# Patient Record
Sex: FEMALE | Race: WHITE | NOT HISPANIC OR LATINO | Employment: UNEMPLOYED | URBAN - METROPOLITAN AREA
[De-identification: names, ages, dates, MRNs, and addresses within clinical notes are randomized per-mention and may not be internally consistent; named-entity substitution may affect disease eponyms.]

---

## 2017-01-10 ENCOUNTER — PATIENT MESSAGE (OUTPATIENT)
Dept: INTERNAL MEDICINE | Facility: CLINIC | Age: 57
End: 2017-01-10

## 2017-01-11 ENCOUNTER — PATIENT MESSAGE (OUTPATIENT)
Dept: INTERNAL MEDICINE | Facility: CLINIC | Age: 57
End: 2017-01-11

## 2017-01-18 ENCOUNTER — PATIENT MESSAGE (OUTPATIENT)
Dept: INTERNAL MEDICINE | Facility: CLINIC | Age: 57
End: 2017-01-18

## 2017-01-19 ENCOUNTER — PATIENT MESSAGE (OUTPATIENT)
Dept: INTERNAL MEDICINE | Facility: CLINIC | Age: 57
End: 2017-01-19

## 2017-01-20 ENCOUNTER — PATIENT MESSAGE (OUTPATIENT)
Dept: INTERNAL MEDICINE | Facility: CLINIC | Age: 57
End: 2017-01-20

## 2017-01-31 ENCOUNTER — PATIENT MESSAGE (OUTPATIENT)
Dept: INTERNAL MEDICINE | Facility: CLINIC | Age: 57
End: 2017-01-31

## 2017-02-02 ENCOUNTER — PATIENT MESSAGE (OUTPATIENT)
Dept: INTERNAL MEDICINE | Facility: CLINIC | Age: 57
End: 2017-02-02

## 2017-02-07 ENCOUNTER — PATIENT MESSAGE (OUTPATIENT)
Dept: INTERNAL MEDICINE | Facility: CLINIC | Age: 57
End: 2017-02-07

## 2017-02-08 ENCOUNTER — PATIENT MESSAGE (OUTPATIENT)
Dept: INTERNAL MEDICINE | Facility: CLINIC | Age: 57
End: 2017-02-08

## 2017-02-15 ENCOUNTER — PATIENT MESSAGE (OUTPATIENT)
Dept: INTERNAL MEDICINE | Facility: CLINIC | Age: 57
End: 2017-02-15

## 2017-02-17 ENCOUNTER — PATIENT MESSAGE (OUTPATIENT)
Dept: INTERNAL MEDICINE | Facility: CLINIC | Age: 57
End: 2017-02-17

## 2017-03-01 ENCOUNTER — PATIENT MESSAGE (OUTPATIENT)
Dept: INTERNAL MEDICINE | Facility: CLINIC | Age: 57
End: 2017-03-01

## 2017-03-05 ENCOUNTER — PATIENT MESSAGE (OUTPATIENT)
Dept: INTERNAL MEDICINE | Facility: CLINIC | Age: 57
End: 2017-03-05

## 2017-03-06 ENCOUNTER — PATIENT MESSAGE (OUTPATIENT)
Dept: INTERNAL MEDICINE | Facility: CLINIC | Age: 57
End: 2017-03-06

## 2017-03-07 ENCOUNTER — TELEPHONE (OUTPATIENT)
Dept: INTERNAL MEDICINE | Facility: CLINIC | Age: 57
End: 2017-03-07

## 2017-03-07 ENCOUNTER — PATIENT MESSAGE (OUTPATIENT)
Dept: INTERNAL MEDICINE | Facility: CLINIC | Age: 57
End: 2017-03-07

## 2017-03-15 ENCOUNTER — PATIENT MESSAGE (OUTPATIENT)
Dept: INTERNAL MEDICINE | Facility: CLINIC | Age: 57
End: 2017-03-15

## 2017-03-16 ENCOUNTER — PATIENT MESSAGE (OUTPATIENT)
Dept: INTERNAL MEDICINE | Facility: CLINIC | Age: 57
End: 2017-03-16

## 2017-03-28 ENCOUNTER — PATIENT MESSAGE (OUTPATIENT)
Dept: INTERNAL MEDICINE | Facility: CLINIC | Age: 57
End: 2017-03-28

## 2017-03-29 ENCOUNTER — PATIENT MESSAGE (OUTPATIENT)
Dept: INTERNAL MEDICINE | Facility: CLINIC | Age: 57
End: 2017-03-29

## 2017-03-30 ENCOUNTER — PATIENT MESSAGE (OUTPATIENT)
Dept: INTERNAL MEDICINE | Facility: CLINIC | Age: 57
End: 2017-03-30

## 2017-04-03 ENCOUNTER — PATIENT MESSAGE (OUTPATIENT)
Dept: INTERNAL MEDICINE | Facility: CLINIC | Age: 57
End: 2017-04-03

## 2017-04-20 ENCOUNTER — PATIENT MESSAGE (OUTPATIENT)
Dept: INTERNAL MEDICINE | Facility: CLINIC | Age: 57
End: 2017-04-20

## 2017-05-09 ENCOUNTER — PATIENT MESSAGE (OUTPATIENT)
Dept: INTERNAL MEDICINE | Facility: CLINIC | Age: 57
End: 2017-05-09

## 2017-05-11 ENCOUNTER — PATIENT MESSAGE (OUTPATIENT)
Dept: INTERNAL MEDICINE | Facility: CLINIC | Age: 57
End: 2017-05-11

## 2017-05-16 ENCOUNTER — PATIENT MESSAGE (OUTPATIENT)
Dept: INTERNAL MEDICINE | Facility: CLINIC | Age: 57
End: 2017-05-16

## 2017-05-18 ENCOUNTER — PATIENT MESSAGE (OUTPATIENT)
Dept: INTERNAL MEDICINE | Facility: CLINIC | Age: 57
End: 2017-05-18

## 2017-05-23 ENCOUNTER — PATIENT MESSAGE (OUTPATIENT)
Dept: INTERNAL MEDICINE | Facility: CLINIC | Age: 57
End: 2017-05-23

## 2017-05-25 ENCOUNTER — PATIENT MESSAGE (OUTPATIENT)
Dept: INTERNAL MEDICINE | Facility: CLINIC | Age: 57
End: 2017-05-25

## 2017-05-30 ENCOUNTER — PATIENT MESSAGE (OUTPATIENT)
Dept: INTERNAL MEDICINE | Facility: CLINIC | Age: 57
End: 2017-05-30

## 2017-05-31 ENCOUNTER — PATIENT MESSAGE (OUTPATIENT)
Dept: INTERNAL MEDICINE | Facility: CLINIC | Age: 57
End: 2017-05-31

## 2017-06-01 ENCOUNTER — OFFICE VISIT (OUTPATIENT)
Dept: INTERNAL MEDICINE | Facility: CLINIC | Age: 57
End: 2017-06-01
Payer: COMMERCIAL

## 2017-06-01 VITALS
HEIGHT: 65 IN | WEIGHT: 185.63 LBS | HEART RATE: 78 BPM | BODY MASS INDEX: 30.93 KG/M2 | DIASTOLIC BLOOD PRESSURE: 72 MMHG | SYSTOLIC BLOOD PRESSURE: 111 MMHG

## 2017-06-01 DIAGNOSIS — L03.114 CELLULITIS OF LEFT UPPER EXTREMITY: Primary | ICD-10-CM

## 2017-06-01 PROCEDURE — 99999 PR PBB SHADOW E&M-EST. PATIENT-LVL III: CPT | Mod: PBBFAC,,, | Performed by: INTERNAL MEDICINE

## 2017-06-01 PROCEDURE — 99212 OFFICE O/P EST SF 10 MIN: CPT | Mod: S$GLB,,, | Performed by: INTERNAL MEDICINE

## 2017-06-01 RX ORDER — SULFAMETHOXAZOLE AND TRIMETHOPRIM 800; 160 MG/1; MG/1
TABLET ORAL
COMMUNITY
Start: 2017-05-30 | End: 2017-10-16

## 2017-06-01 RX ORDER — MUPIROCIN 20 MG/G
OINTMENT TOPICAL
COMMUNITY
Start: 2017-05-30 | End: 2018-03-08

## 2017-06-01 NOTE — PROGRESS NOTES
REASON FOR VISIT:  This is a 56-year-old female.  She is here to be evaluated   regarding a skin infection on her left forearm.  Five days ago, she had a paper   cut on her forearm.  She started seeing redness going over the area and it was a   little bit painful, and she went to Urgent Care two days ago.  Bactroban and   Bactrim were given yesterday and last night she thought it was getting better.    Today it does not look much different.  She is getting ready to go on a vacation   and just wanted to have it checked out.  There is minimal pain.    PAST MEDICAL HISTORY:  Hypothyroid disease.    PHYSICAL EXAMINATION:  VITAL SIGNS:  Per Epic.  SKIN:  She has about a 3 cm linear cut with scabbing over it.  There is some   surrounding hyperemia.  The skin is not indurated.  It is not fluctuant.    Minimal warmth.    IMPRESSION:  Cellulitis.    PLAN:  Reassured her that the management is appropriate.  She probably can use   Bactroban for another couple of days, but finish off the Bactrim DS twice a day   for a total of 10 days.    /sc [id number] review      KENNETH/RACH  dd: 06/01/2017 10:11:49 (CDT)  td: 06/02/2017 01:17:27 (CDT)  Doc ID   #[id number]  Job ID #[id number]    CC:

## 2017-06-02 ENCOUNTER — PATIENT MESSAGE (OUTPATIENT)
Dept: INTERNAL MEDICINE | Facility: CLINIC | Age: 57
End: 2017-06-02

## 2017-06-05 ENCOUNTER — PATIENT MESSAGE (OUTPATIENT)
Dept: INTERNAL MEDICINE | Facility: CLINIC | Age: 57
End: 2017-06-05

## 2017-06-06 ENCOUNTER — PATIENT MESSAGE (OUTPATIENT)
Dept: INTERNAL MEDICINE | Facility: CLINIC | Age: 57
End: 2017-06-06

## 2017-06-15 RX ORDER — LEVOTHYROXINE SODIUM 88 UG/1
88 TABLET ORAL
Qty: 30 TABLET | Refills: 10 | Status: SHIPPED | OUTPATIENT
Start: 2017-06-15 | End: 2017-10-26

## 2017-06-21 ENCOUNTER — APPOINTMENT (OUTPATIENT)
Dept: LAB | Facility: HOSPITAL | Age: 57
End: 2017-06-21

## 2017-06-21 ENCOUNTER — TELEPHONE (OUTPATIENT)
Dept: GASTROENTEROLOGY | Facility: CLINIC | Age: 57
End: 2017-06-21

## 2017-06-21 ENCOUNTER — OFFICE VISIT (OUTPATIENT)
Dept: GASTROENTEROLOGY | Facility: CLINIC | Age: 57
End: 2017-06-21

## 2017-06-21 VITALS
OXYGEN SATURATION: 99 % | SYSTOLIC BLOOD PRESSURE: 152 MMHG | TEMPERATURE: 97.6 F | BODY MASS INDEX: 29.89 KG/M2 | HEIGHT: 66 IN | HEART RATE: 84 BPM | DIASTOLIC BLOOD PRESSURE: 80 MMHG | WEIGHT: 186 LBS

## 2017-06-21 DIAGNOSIS — R10.32 ABDOMINAL PAIN, LEFT LOWER QUADRANT: Primary | ICD-10-CM

## 2017-06-21 DIAGNOSIS — Z78.9 NONSMOKER: ICD-10-CM

## 2017-06-21 LAB
ALBUMIN SERPL-MCNC: 4.5 G/DL (ref 3.5–5)
ALBUMIN/GLOB SERPL: 1.4 G/DL (ref 1.1–2.5)
ALP SERPL-CCNC: 80 U/L (ref 24–120)
ALT SERPL W P-5'-P-CCNC: 46 U/L (ref 0–54)
ANION GAP SERPL CALCULATED.3IONS-SCNC: 14 MMOL/L (ref 4–13)
AST SERPL-CCNC: 29 U/L (ref 7–45)
BASOPHILS # BLD AUTO: 0.02 10*3/MM3 (ref 0–0.2)
BASOPHILS NFR BLD AUTO: 0.2 % (ref 0–2)
BILIRUB SERPL-MCNC: 0.5 MG/DL (ref 0.1–1)
BILIRUB UR QL STRIP: NEGATIVE
BUN BLD-MCNC: 16 MG/DL (ref 5–21)
BUN/CREAT SERPL: 26.2 (ref 7–25)
CALCIUM SPEC-SCNC: 9.3 MG/DL (ref 8.4–10.4)
CHLORIDE SERPL-SCNC: 105 MMOL/L (ref 98–110)
CLARITY UR: CLEAR
CO2 SERPL-SCNC: 22 MMOL/L (ref 24–31)
COLOR UR: YELLOW
CREAT BLD-MCNC: 0.61 MG/DL (ref 0.5–1.4)
CRP SERPL-MCNC: <0.5 MG/DL (ref 0–0.99)
DEPRECATED RDW RBC AUTO: 41.9 FL (ref 40–54)
EOSINOPHIL # BLD AUTO: 0.04 10*3/MM3 (ref 0–0.7)
EOSINOPHIL NFR BLD AUTO: 0.5 % (ref 0–4)
ERYTHROCYTE [DISTWIDTH] IN BLOOD BY AUTOMATED COUNT: 13.2 % (ref 12–15)
ERYTHROCYTE [SEDIMENTATION RATE] IN BLOOD: 26 MM/HR (ref 0–20)
GFR SERPL CREATININE-BSD FRML MDRD: 101 ML/MIN/1.73
GLOBULIN UR ELPH-MCNC: 3.2 GM/DL
GLUCOSE BLD-MCNC: 89 MG/DL (ref 70–100)
GLUCOSE UR STRIP-MCNC: NEGATIVE MG/DL
HCT VFR BLD AUTO: 40.3 % (ref 37–47)
HGB BLD-MCNC: 13.5 G/DL (ref 12–16)
HGB UR QL STRIP.AUTO: NEGATIVE
IMM GRANULOCYTES # BLD: 0.02 10*3/MM3 (ref 0–0.03)
IMM GRANULOCYTES NFR BLD: 0.2 % (ref 0–5)
KETONES UR QL STRIP: NEGATIVE
LEUKOCYTE ESTERASE UR QL STRIP.AUTO: NEGATIVE
LYMPHOCYTES # BLD AUTO: 2.2 10*3/MM3 (ref 0.72–4.86)
LYMPHOCYTES NFR BLD AUTO: 26.2 % (ref 15–45)
MCH RBC QN AUTO: 29.2 PG (ref 28–32)
MCHC RBC AUTO-ENTMCNC: 33.5 G/DL (ref 33–36)
MCV RBC AUTO: 87.2 FL (ref 82–98)
MONOCYTES # BLD AUTO: 0.4 10*3/MM3 (ref 0.19–1.3)
MONOCYTES NFR BLD AUTO: 4.8 % (ref 4–12)
NEUTROPHILS # BLD AUTO: 5.73 10*3/MM3 (ref 1.87–8.4)
NEUTROPHILS NFR BLD AUTO: 68.1 % (ref 39–78)
NITRITE UR QL STRIP: NEGATIVE
PH UR STRIP.AUTO: 5.5 [PH] (ref 5–8)
PLATELET # BLD AUTO: 237 10*3/MM3 (ref 130–400)
PMV BLD AUTO: 9.7 FL (ref 6–12)
POTASSIUM BLD-SCNC: 4.4 MMOL/L (ref 3.5–5.3)
PROT SERPL-MCNC: 7.7 G/DL (ref 6.3–8.7)
PROT UR QL STRIP: NEGATIVE
RBC # BLD AUTO: 4.62 10*6/MM3 (ref 4.2–5.4)
SODIUM BLD-SCNC: 141 MMOL/L (ref 135–145)
SP GR UR STRIP: 1.01 (ref 1–1.03)
UROBILINOGEN UR QL STRIP: NORMAL
WBC NRBC COR # BLD: 8.41 10*3/MM3 (ref 4.8–10.8)

## 2017-06-21 PROCEDURE — 99204 OFFICE O/P NEW MOD 45 MIN: CPT | Performed by: CLINICAL NURSE SPECIALIST

## 2017-06-21 PROCEDURE — 81003 URINALYSIS AUTO W/O SCOPE: CPT | Performed by: CLINICAL NURSE SPECIALIST

## 2017-06-21 PROCEDURE — 80053 COMPREHEN METABOLIC PANEL: CPT | Performed by: CLINICAL NURSE SPECIALIST

## 2017-06-21 PROCEDURE — 85651 RBC SED RATE NONAUTOMATED: CPT | Performed by: CLINICAL NURSE SPECIALIST

## 2017-06-21 PROCEDURE — 85025 COMPLETE CBC W/AUTO DIFF WBC: CPT | Performed by: CLINICAL NURSE SPECIALIST

## 2017-06-21 PROCEDURE — 86140 C-REACTIVE PROTEIN: CPT | Performed by: CLINICAL NURSE SPECIALIST

## 2017-06-21 PROCEDURE — 36415 COLL VENOUS BLD VENIPUNCTURE: CPT | Performed by: CLINICAL NURSE SPECIALIST

## 2017-06-21 RX ORDER — METRONIDAZOLE 250 MG/1
250 TABLET ORAL 4 TIMES DAILY
Qty: 40 TABLET | Refills: 0 | Status: SHIPPED | OUTPATIENT
Start: 2017-06-21

## 2017-06-21 RX ORDER — ALPRAZOLAM 0.5 MG/1
TABLET ORAL
COMMUNITY
Start: 2017-06-16

## 2017-06-21 RX ORDER — ESTRADIOL AND NORETHINDRONE ACETATE .5; .1 MG/1; MG/1
TABLET ORAL
COMMUNITY
Start: 2017-06-15

## 2017-06-21 RX ORDER — LIOTHYRONINE SODIUM 25 UG/1
TABLET ORAL
Refills: 5 | COMMUNITY
Start: 2017-05-30

## 2017-06-21 RX ORDER — LEVOTHYROXINE SODIUM 88 MCG
TABLET ORAL
COMMUNITY
Start: 2017-06-15

## 2017-06-21 NOTE — PROGRESS NOTES
Gali Carson  1960  Chief Complaint   Patient presents with   • GI Problem     New patient here to discuss abdominal pain     Subjective   HPI  Gali Carson is a 56 y.o. female who presents with a complaint of abdominal pain to the LLQ/pelvic region that began 4 days ago intermittent and initially mild. It became more persistent and constant today at 10am. It is constant sharp stabbing and rated a 5 or 6 out of 10. It is non radiating. She denies any fever chills or sweats. No wt loss. No BRBPR. No melena. She is tolerating a regular diet. She has had diverticulitis in the past severe requiring resection she had the complication post operative with a microperforation. She has had a stricture at that site as well that required surgery in 2015. She also tells me that she did lift a heavy suit case prior to leaving to come here due to the illness of her mother in law. They are leaving this week in 3 days for  in [place].    Past Medical History:   Diagnosis Date   • Anxiety    • Diverticulitis    • H/O Clostridium difficile infection    • Thyroid disease      Past Surgical History:   Procedure Laterality Date   •  SECTION      x 3   • CHOLECYSTECTOMY     • COLON SURGERY      x 2 resection due to diverticultis with subsequent stricture   • HERNIA REPAIR       No outpatient prescriptions have been marked as taking for the 17 encounter (Office Visit) with CALIXTO Arias.     Allergies   Allergen Reactions   • Cephalosporins    • Penicillins      Social History     Social History   • Marital status:      Spouse name: N/A   • Number of children: N/A   • Years of education: N/A     Occupational History   • Not on file.     Social History Main Topics   • Smoking status: Never Smoker   • Smokeless tobacco: Never Used   • Alcohol use Yes      Comment: 1-2 glasses of wine nightly   • Drug use: Not on file   • Sexual activity: Not on file     Other Topics  "Concern   • Not on file     Social History Narrative   • No narrative on file     Family History   Problem Relation Age of Onset   • Colon cancer Neg Hx    • Colon polyps Neg Hx      Health Maintenance   Topic Date Due   • TDAP/TD VACCINES (1 - Tdap) 09/04/1977   • HEPATITIS C SCREENING  06/21/2017   • MAMMOGRAM  06/21/2017   • COLONOSCOPY  06/21/2017   • PAP SMEAR  06/21/2017   • INFLUENZA VACCINE  08/01/2017     Review of Systems   Constitutional: Negative for activity change, appetite change, chills, diaphoresis, fatigue, fever and unexpected weight change.   HENT: Negative for ear pain, hearing loss, mouth sores, sore throat, trouble swallowing and voice change.    Eyes: Negative.    Respiratory: Negative for cough, choking, shortness of breath and wheezing.    Cardiovascular: Negative for chest pain and palpitations.   Gastrointestinal: Positive for abdominal pain. Negative for blood in stool, constipation, diarrhea, nausea and vomiting.   Endocrine: Negative for cold intolerance and heat intolerance.   Genitourinary: Negative for decreased urine volume, dysuria, frequency, hematuria and urgency.   Musculoskeletal: Negative for back pain, gait problem and myalgias.   Skin: Negative for color change, pallor and rash.   Allergic/Immunologic: Negative for food allergies and immunocompromised state.   Neurological: Negative for dizziness, tremors, seizures, syncope, weakness, light-headedness, numbness and headaches.   Hematological: Negative for adenopathy. Does not bruise/bleed easily.   Psychiatric/Behavioral: Negative for agitation and confusion. The patient is not nervous/anxious.    All other systems reviewed and are negative.    Objective   Vitals:    06/21/17 1338   BP: 152/80   Pulse: 84   Temp: 97.6 °F (36.4 °C)   SpO2: 99%   Weight: 186 lb (84.4 kg)   Height: 66\" (167.6 cm)     Body mass index is 30.02 kg/(m^2).  Physical Exam   Constitutional: She is oriented to person, place, and time. She appears " well-developed and well-nourished.   HENT:   Head: Normocephalic and atraumatic.   Eyes: Pupils are equal, round, and reactive to light.   Neck: Normal range of motion. Neck supple. No tracheal deviation present.   Cardiovascular: Normal rate, regular rhythm and normal heart sounds.  Exam reveals no gallop and no friction rub.    No murmur heard.  Pulmonary/Chest: Effort normal and breath sounds normal. No respiratory distress. She has no wheezes. She has no rales. She exhibits no tenderness.   Abdominal: Soft. Bowel sounds are normal. She exhibits no distension. There is no hepatosplenomegaly. There is no tenderness (tender over the pelvic region no guarding or rebound). There is no rigidity, no rebound and no guarding.   Musculoskeletal: Normal range of motion. She exhibits no edema, tenderness or deformity.   Neurological: She is alert and oriented to person, place, and time. She has normal reflexes.   Skin: Skin is warm and dry. No rash noted. No pallor.   Psychiatric: She has a normal mood and affect. Her behavior is normal. Judgment and thought content normal.     Assessment/Plan   Gali was seen today for gi problem.    Diagnoses and all orders for this visit:    Abdominal pain, left lower quadrant  -     CBC & Differential  -     Comprehensive Metabolic Panel  -     Sedimentation Rate  -     C-reactive Protein  -     Urinalysis With / Culture If Indicated  -     CT Abdomen Pelvis With Contrast; Future  -     metroNIDAZOLE (FLAGYL) 250 MG tablet; Take 1 tablet by mouth 4 (Four) Times a Day.    Nonsmoker    Long discussion with Dr Schreiber greater than 20 minutes regarding all differentials to include diverticulitis vs other. She has an extensive hx to include surgery for diverticulitis and microperforation in the past with subsequent surgery again in December 2015.     EMR Dragon/transcription disclaimer: Much of this encounter note is electronic transcription/translation of spoken language to printed text.  The electronic translation of spoken language may be erroneous, or at times, nonsensical words or phrases may be inadvertently transcribed. Although I have reviewed the note for such errors, some may still exist.  Body mass index is 30.02 kg/(m^2).  Return in about 1 day (around 6/22/2017).      All risks, benefits, alternatives, and indications of colonoscopy and/or Endoscopy procedure have been discussed with the patient. Risks to include perforation of the colon requiring possible surgery or colostomy, risk of bleeding from biopsies or removal of colon tissue, possibility of missing a colon polyp or cancer, or adverse drug reaction.  Benefits to include the diagnosis and management of disease of the colon and rectum. Alternatives to include barium enema, radiographic evaluation, lab testing or no intervention. Pt verbalizes understanding and agrees.

## 2017-06-21 NOTE — TELEPHONE ENCOUNTER
PC CT ABD/PELVIS [id number] VALID 6/21/17 TO 7/20/17 PER DUNIA AT [facility] [phone]    SCHEDULED FOR 6/22/17 @ 7:30 ARRIVAL TIME BIC

## 2017-06-22 ENCOUNTER — HOSPITAL ENCOUNTER (OUTPATIENT)
Dept: CT IMAGING | Facility: HOSPITAL | Age: 57
Discharge: HOME | End: 2017-06-22
Admitting: CLINICAL NURSE SPECIALIST

## 2017-06-22 DIAGNOSIS — R10.32 ABDOMINAL PAIN, LEFT LOWER QUADRANT: ICD-10-CM

## 2017-06-22 LAB — CREAT BLDA-MCNC: 0.6 MG/DL (ref 0.6–1.3)

## 2017-06-22 PROCEDURE — 74177 CT ABD & PELVIS W/CONTRAST: CPT

## 2017-06-22 PROCEDURE — 0 IOPAMIDOL 61 % SOLUTION: Performed by: CLINICAL NURSE SPECIALIST

## 2017-06-22 PROCEDURE — 82565 ASSAY OF CREATININE: CPT

## 2017-06-22 RX ADMIN — IOPAMIDOL 100 ML: 612 INJECTION, SOLUTION INTRAVENOUS at 09:15

## 2017-10-16 ENCOUNTER — LAB VISIT (OUTPATIENT)
Dept: LAB | Facility: HOSPITAL | Age: 57
End: 2017-10-16
Attending: INTERNAL MEDICINE
Payer: COMMERCIAL

## 2017-10-16 ENCOUNTER — OFFICE VISIT (OUTPATIENT)
Dept: INTERNAL MEDICINE | Facility: CLINIC | Age: 57
End: 2017-10-16
Payer: COMMERCIAL

## 2017-10-16 VITALS
SYSTOLIC BLOOD PRESSURE: 117 MMHG | TEMPERATURE: 98 F | BODY MASS INDEX: 31.25 KG/M2 | WEIGHT: 194.44 LBS | HEART RATE: 77 BPM | HEIGHT: 66 IN | DIASTOLIC BLOOD PRESSURE: 69 MMHG

## 2017-10-16 DIAGNOSIS — B34.9 VIRAL SYNDROME: ICD-10-CM

## 2017-10-16 DIAGNOSIS — Z78.0 MENOPAUSE: ICD-10-CM

## 2017-10-16 DIAGNOSIS — E03.9 HYPOTHYROIDISM, UNSPECIFIED TYPE: ICD-10-CM

## 2017-10-16 DIAGNOSIS — M79.10 MYALGIA: ICD-10-CM

## 2017-10-16 DIAGNOSIS — B34.9 VIRAL SYNDROME: Primary | ICD-10-CM

## 2017-10-16 LAB
BASOPHILS # BLD AUTO: 0.03 K/UL
BASOPHILS NFR BLD: 0.4 %
DIFFERENTIAL METHOD: NORMAL
EOSINOPHIL # BLD AUTO: 0.1 K/UL
EOSINOPHIL NFR BLD: 1.2 %
ERYTHROCYTE [DISTWIDTH] IN BLOOD BY AUTOMATED COUNT: 12.9 %
FLUAV AG SPEC QL IA: NEGATIVE
FLUBV AG SPEC QL IA: NEGATIVE
HCT VFR BLD AUTO: 39.4 %
HGB BLD-MCNC: 12.9 G/DL
LYMPHOCYTES # BLD AUTO: 1.9 K/UL
LYMPHOCYTES NFR BLD: 26.4 %
MCH RBC QN AUTO: 28.5 PG
MCHC RBC AUTO-ENTMCNC: 32.7 G/DL
MCV RBC AUTO: 87 FL
MONOCYTES # BLD AUTO: 0.5 K/UL
MONOCYTES NFR BLD: 6.5 %
NEUTROPHILS # BLD AUTO: 4.7 K/UL
NEUTROPHILS NFR BLD: 65.1 %
NRBC BLD-RTO: 0 /100 WBC
PLATELET # BLD AUTO: 217 K/UL
PMV BLD AUTO: 10 FL
RBC # BLD AUTO: 4.53 M/UL
SPECIMEN SOURCE: NORMAL
WBC # BLD AUTO: 7.24 K/UL

## 2017-10-16 PROCEDURE — 84443 ASSAY THYROID STIM HORMONE: CPT

## 2017-10-16 PROCEDURE — 84480 ASSAY TRIIODOTHYRONINE (T3): CPT

## 2017-10-16 PROCEDURE — 36415 COLL VENOUS BLD VENIPUNCTURE: CPT | Mod: PO

## 2017-10-16 PROCEDURE — 87400 INFLUENZA A/B EACH AG IA: CPT

## 2017-10-16 PROCEDURE — 99999 PR PBB SHADOW E&M-EST. PATIENT-LVL III: CPT | Mod: PBBFAC,,, | Performed by: INTERNAL MEDICINE

## 2017-10-16 PROCEDURE — 82306 VITAMIN D 25 HYDROXY: CPT

## 2017-10-16 PROCEDURE — 84439 ASSAY OF FREE THYROXINE: CPT

## 2017-10-16 PROCEDURE — 99214 OFFICE O/P EST MOD 30 MIN: CPT | Mod: S$GLB,,, | Performed by: INTERNAL MEDICINE

## 2017-10-16 PROCEDURE — 80053 COMPREHEN METABOLIC PANEL: CPT

## 2017-10-16 PROCEDURE — 86140 C-REACTIVE PROTEIN: CPT

## 2017-10-16 PROCEDURE — 85025 COMPLETE CBC W/AUTO DIFF WBC: CPT

## 2017-10-16 RX ORDER — GUAIFENESIN 600 MG/1
1200 TABLET, EXTENDED RELEASE ORAL 2 TIMES DAILY
COMMUNITY
End: 2017-10-16

## 2017-10-16 RX ORDER — ESTRADIOL AND NORETHINDRONE ACETATE .5; .1 MG/1; MG/1
1 TABLET ORAL DAILY
Qty: 28 TABLET | Refills: 0 | Status: SHIPPED | OUTPATIENT
Start: 2017-10-16 | End: 2017-10-26

## 2017-10-16 RX ORDER — ZINC GLUCONATE 50 MG
50 TABLET ORAL DAILY
COMMUNITY

## 2017-10-16 RX ORDER — IBUPROFEN 100 MG/5ML
1000 SUSPENSION, ORAL (FINAL DOSE FORM) ORAL 2 TIMES DAILY
COMMUNITY
End: 2017-10-16

## 2017-10-16 NOTE — PROGRESS NOTES
REASON FOR VISIT:  This is a 57-year-old female who for the past four days she   has been feeling achy in the head and chest with a little bit of congestion, and   not really blowing out much mucus at all.  During this time, there has been no   fever.  Recently she was in [place] for 25 days and is exhausted.  Her    has metastatic prostate cancer and has been going for various types of therapy.    They just got back about four days ago.    PAST MEDICAL HISTORY:  Hypothyroid disease.    MEDICATIONS:  List per MedCard.    PHYSICAL EXAMINATION:  VITAL SIGNS:  Per Epic.  She is afebrile.  HEENT:  Tympanic membranes normal.  Nasal mucosa, some clear congestion.    Oropharynx, no abnormal findings.  NECK:  There is no adenopathy.  LUNGS:  Clear.  HEART:  Regular rate and rhythm.    IMPRESSION:  1.  Viral syndrome.  2.  Myalgia.  3.  Hypothyroid disease.    PLAN:  She is very fearful of something else happening.  We will get a battery   of tests.  A nasal swab for influenza was taken.  She can take Advil or Aleve   throughout the day and at night, as well as an antihistamine.  If things do not   improve, we can consider steroid injection.    /sc [id number] review        JAM/RACH  dd: 10/16/2017 16:14:57 (CDT)  td: 10/17/2017 07:36:46 (CDT)  Doc ID   #[id number]  Job ID #[id number]    CC:

## 2017-10-17 ENCOUNTER — PATIENT MESSAGE (OUTPATIENT)
Dept: INTERNAL MEDICINE | Facility: CLINIC | Age: 57
End: 2017-10-17

## 2017-10-17 LAB
25(OH)D3+25(OH)D2 SERPL-MCNC: 36 NG/ML
ALBUMIN SERPL BCP-MCNC: 3.6 G/DL
ALP SERPL-CCNC: 70 U/L
ALT SERPL W/O P-5'-P-CCNC: 25 U/L
ANION GAP SERPL CALC-SCNC: 14 MMOL/L
AST SERPL-CCNC: 23 U/L
BILIRUB SERPL-MCNC: 0.4 MG/DL
BUN SERPL-MCNC: 15 MG/DL
CALCIUM SERPL-MCNC: 9.2 MG/DL
CHLORIDE SERPL-SCNC: 106 MMOL/L
CO2 SERPL-SCNC: 20 MMOL/L
CREAT SERPL-MCNC: 0.8 MG/DL
CRP SERPL-MCNC: 1.1 MG/L
EST. GFR  (AFRICAN AMERICAN): >60 ML/MIN/1.73 M^2
EST. GFR  (NON AFRICAN AMERICAN): >60 ML/MIN/1.73 M^2
GLUCOSE SERPL-MCNC: 86 MG/DL
POTASSIUM SERPL-SCNC: 3.8 MMOL/L
PROT SERPL-MCNC: 7.4 G/DL
SODIUM SERPL-SCNC: 140 MMOL/L
T3 SERPL-MCNC: 110 NG/DL
T4 FREE SERPL-MCNC: 0.9 NG/DL
TSH SERPL DL<=0.005 MIU/L-ACNC: 2.5 UIU/ML

## 2017-10-26 ENCOUNTER — OFFICE VISIT (OUTPATIENT)
Dept: OBSTETRICS AND GYNECOLOGY | Facility: CLINIC | Age: 57
End: 2017-10-26
Payer: COMMERCIAL

## 2017-10-26 VITALS
DIASTOLIC BLOOD PRESSURE: 70 MMHG | HEIGHT: 66 IN | SYSTOLIC BLOOD PRESSURE: 104 MMHG | BODY MASS INDEX: 31.48 KG/M2 | WEIGHT: 195.88 LBS

## 2017-10-26 DIAGNOSIS — Z12.4 SCREENING FOR CERVICAL CANCER: ICD-10-CM

## 2017-10-26 DIAGNOSIS — Z12.31 VISIT FOR SCREENING MAMMOGRAM: ICD-10-CM

## 2017-10-26 DIAGNOSIS — Z11.51 ENCOUNTER FOR SCREENING FOR HUMAN PAPILLOMAVIRUS (HPV): ICD-10-CM

## 2017-10-26 DIAGNOSIS — Z11.51 SCREENING FOR HPV (HUMAN PAPILLOMAVIRUS): ICD-10-CM

## 2017-10-26 DIAGNOSIS — Z01.419 ENCOUNTER FOR GYNECOLOGICAL EXAMINATION: Primary | ICD-10-CM

## 2017-10-26 PROCEDURE — 88175 CYTOPATH C/V AUTO FLUID REDO: CPT

## 2017-10-26 PROCEDURE — 99999 PR PBB SHADOW E&M-EST. PATIENT-LVL IV: CPT | Mod: PBBFAC,,, | Performed by: OBSTETRICS & GYNECOLOGY

## 2017-10-26 PROCEDURE — 87624 HPV HI-RISK TYP POOLED RSLT: CPT

## 2017-10-26 PROCEDURE — 99396 PREV VISIT EST AGE 40-64: CPT | Mod: S$GLB,,, | Performed by: OBSTETRICS & GYNECOLOGY

## 2017-10-26 RX ORDER — LEVOTHYROXINE SODIUM 88 UG/1
88 TABLET ORAL
COMMUNITY
Start: 2017-09-18 | End: 2018-12-12

## 2017-10-26 RX ORDER — DIPHENHYDRAMINE HCL 25 MG
12.5 CAPSULE ORAL
COMMUNITY
End: 2021-05-11

## 2017-10-26 RX ORDER — ESTRADIOL AND NORETHINDRONE ACETATE .5; .1 MG/1; MG/1
1 TABLET ORAL
COMMUNITY
Start: 2017-09-18 | End: 2018-03-08 | Stop reason: SDUPTHER

## 2017-10-26 RX ORDER — FLUTICASONE PROPIONATE 50 MCG
2 SPRAY, SUSPENSION (ML) NASAL
COMMUNITY
End: 2023-03-24

## 2017-10-26 RX ORDER — ALPRAZOLAM 0.5 MG
TABLET ORAL
Refills: 0 | COMMUNITY
Start: 2017-10-18 | End: 2018-03-08 | Stop reason: SDUPTHER

## 2017-10-26 RX ORDER — IBUPROFEN 200 MG
200 TABLET ORAL
COMMUNITY
End: 2020-03-05

## 2017-10-26 NOTE — PROGRESS NOTES
Subjective:       Patient ID: Abiola Borjas is a 57 y.o. female.    Chief Complaint:  Well Woman (14-pap,hpv-negative/negative 16-mammo normal )      History of Present Illness.  Abiola Borjas is a 57 y.o. female.  She has no breast or urinary symptoms.  She has no postcoital bleeding, pelvic pain or vaginal discharge.  Patient's  has advanced prostate cancer so she is stressed.  She will be starting Lexapro prescribed by another physician and is worried about weight gain.    GYN & OB History  No LMP recorded (lmp unknown). Patient is postmenopausal.   Pap:  Normal HPV negative  Mammogram:  16 Normal  Colonoscopy:  benign polyp  DEXA:  Normal    OB History    Para Term  AB Living   3 3 3     3   SAB TAB Ectopic Multiple Live Births                  # Outcome Date GA Lbr Mark/2nd Weight Sex Delivery Anes PTL Lv   3 Term      CS-LTranv         Complications: Fetal Intolerance   2 Term      CS-LTranv         Complications: Breech presentation   1 Term      CS-LTranv             Past Medical History:   Diagnosis Date    Anxiety     Deviated septum     Diverticulosis of sigmoid colon     Focal nodular hyperplasia of liver     Hypoglycemia     PONV (postoperative nausea and vomiting)     Snores     Thyroid disease      Past Surgical History:   Procedure Laterality Date    ABDOMINAL HERNIA REPAIR       SECTION      CHOLECYSTECTOMY      COLECTOMY  2015    MD Rousseau for stricture    COLON SURGERY      HERNIA REPAIR       Family History   Problem Relation Age of Onset    Diabetes Mother     Hypertension Mother     Cancer Father      kidney    Depression Sister     Breast cancer Paternal Aunt 70    Colon cancer Neg Hx     Miscarriages / Stillbirths Neg Hx      Social History   Substance Use Topics    Smoking status: Never Smoker    Smokeless tobacco: Never Used    Alcohol use No       Current Outpatient Prescriptions:     blood sugar  diagnostic (TRUE METRIX GLUCOSE TEST STRIP) Strp, 1 strip by Misc.(Non-Drug; Combo Route) route 2 (two) times daily., Disp: 100 strip, Rfl: 11    CYTOMEL 25 mcg Tab, TAKE 1/2 TABLET BY MOUTH EVERY EVENING, Disp: 30 tablet, Rfl: 5    diphenhydrAMINE (BENADRYL) 25 mg capsule, Take 12.5 mg by mouth., Disp: , Rfl:     estradiol-norethindrone acet 0.5-0.1 mg per tablet, Take 1 tablet by mouth., Disp: , Rfl:     fluticasone (FLONASE) 50 mcg/actuation nasal spray, 2 sprays by Nasal route., Disp: , Rfl:     ibuprofen (ADVIL,MOTRIN) 200 MG tablet, Take 200 mg by mouth., Disp: , Rfl:     lancets Misc, 1 Device by Misc.(Non-Drug; Combo Route) route 2 (two) times daily., Disp: 100 each, Rfl: 11    levothyroxine (SYNTHROID) 88 MCG tablet, Take 88 mcg by mouth., Disp: , Rfl:     mupirocin (BACTROBAN) 2 % ointment, , Disp: , Rfl:     XANAX 0.5 mg tablet, , Disp: , Rfl: 0    zinc gluconate 50 mg tablet, Take 50 mg by mouth once daily., Disp: , Rfl:     Review of patient's allergies indicates:   Allergen Reactions    Cephalosporins      Other reaction(s): Swelling    Penicillin g      Other reaction(s): Swelling       Review of Systems  Review of Systems   Constitutional: Negative for fatigue.   HENT: Negative for trouble swallowing.    Eyes: Negative for visual disturbance.   Respiratory: Negative for cough and shortness of breath.    Cardiovascular: Negative for chest pain.   Gastrointestinal: Negative for abdominal distention, abdominal pain, blood in stool, nausea and vomiting.   Genitourinary: Negative for difficulty urinating, dyspareunia, dysuria, flank pain, frequency, hematuria, pelvic pain, urgency, vaginal bleeding, vaginal discharge and vaginal pain.   Musculoskeletal: Negative for arthralgias.   Skin: Negative for rash.   Neurological: Negative for dizziness and headaches.   Psychiatric/Behavioral: Negative for sleep disturbance. The patient is not nervous/anxious.         Objective:     Vitals:    10/26/17  "1133   BP: 104/70   Weight: 88.9 kg (195 lb 14.1 oz)   Height: 5' 6" (1.676 m)   PainSc: 0-No pain     Body mass index is 31.62 kg/m².    Physical Exam:   Constitutional: She is oriented to person, place, and time. Vital signs are normal. She appears well-developed and well-nourished.    HENT:   Head: Normocephalic.     Neck: Normal range of motion. No thyromegaly present.     Pulmonary/Chest: Right breast exhibits no mass, no nipple discharge, no skin change, no tenderness and no swelling. Left breast exhibits no mass, no nipple discharge, no skin change, no tenderness and no swelling. Breasts are symmetrical.        Abdominal: Soft. Normal appearance and bowel sounds are normal. She exhibits no distension. There is no tenderness.     Genitourinary: Vagina normal and uterus normal. Pelvic exam was performed with patient supine. There is no rash, tenderness, lesion or injury on the right labia. There is no rash, tenderness, lesion or injury on the left labia. Cervix is normal. Right adnexum displays no mass, no tenderness and no fullness. Left adnexum displays no mass, no tenderness and no fullness. No erythema in the vagina. No vaginal discharge found. Cervix exhibits no motion tenderness and no discharge.           Musculoskeletal: Normal range of motion.      Lymphadenopathy:        Right: No inguinal and no supraclavicular adenopathy present.        Left: No inguinal and no supraclavicular adenopathy present.    Neurological: She is alert and oriented to person, place, and time.    Skin: Skin is warm and dry.    Psychiatric: She has a normal mood and affect.        Assessment/ Plan:     Encounter for gynecological examination    Visit for screening mammogram  -     Mammo Digital Screening Bilat with Tomosynthesis CAD; Future; Expected date: 10/26/2017    Screening for HPV (human papillomavirus)    Screening for cervical cancer  -     Liquid-based pap smear, screening  -     HPV DNA probe, amplified    Encounter " for screening for human papillomavirus (HPV)  -     Mammo Digital Screening Bilat with Tomosynthesis CAD; Future; Expected date: 10/26/2017      Consider Wellbutrin if she gains weight.  Routine pap smears.  Self breast exam and mammography discussed  Routine colonoscopy discussed.  Diet and exercise discussed.  Recommend calcium 1200 mg and vitamin D 600 units daily and routine bone mineral density testing.  Yearly influenza vaccination discussed.  Follow-up with me in 1 year

## 2017-10-27 ENCOUNTER — PATIENT MESSAGE (OUTPATIENT)
Dept: INTERNAL MEDICINE | Facility: CLINIC | Age: 57
End: 2017-10-27

## 2017-10-27 NOTE — TELEPHONE ENCOUNTER
Dr. Jamil,   These are the orders from [facility]. Thank you for putting them in for today. They would like CBC, Electrolytes, AST, ALT, BUN, Creatinine. Alonzo will bring the paper work in and give it to your nurse today and they would like to have this checked every 2 weeks. Thank you for all that you do. Sorry for the first message, it was incomplete.     Abiola

## 2017-10-27 NOTE — TELEPHONE ENCOUNTER
Alonzo Mccullough's doc would like him to get a cbc test to check his blood. Can you put it in the system for him to come in today and [facility]   Thank you,   Abiola

## 2017-10-28 ENCOUNTER — PATIENT MESSAGE (OUTPATIENT)
Dept: INTERNAL MEDICINE | Facility: CLINIC | Age: 57
End: 2017-10-28

## 2017-11-01 LAB
HPV16 AG SPEC QL: NEGATIVE
HPV16+18+H RISK 12 DNA CVX-IMP: NEGATIVE
HPV18 DNA SPEC QL NAA+PROBE: NEGATIVE

## 2017-11-02 ENCOUNTER — OFFICE VISIT (OUTPATIENT)
Dept: INTERNAL MEDICINE | Facility: CLINIC | Age: 57
End: 2017-11-02
Payer: COMMERCIAL

## 2017-11-02 VITALS
HEIGHT: 66 IN | SYSTOLIC BLOOD PRESSURE: 131 MMHG | TEMPERATURE: 98 F | WEIGHT: 194 LBS | BODY MASS INDEX: 31.18 KG/M2 | HEART RATE: 84 BPM | DIASTOLIC BLOOD PRESSURE: 73 MMHG

## 2017-11-02 DIAGNOSIS — J02.9 PHARYNGITIS, UNSPECIFIED ETIOLOGY: Primary | ICD-10-CM

## 2017-11-02 PROCEDURE — 87070 CULTURE OTHR SPECIMN AEROBIC: CPT

## 2017-11-02 PROCEDURE — 99213 OFFICE O/P EST LOW 20 MIN: CPT | Mod: 25,S$GLB,, | Performed by: INTERNAL MEDICINE

## 2017-11-02 PROCEDURE — 96372 THER/PROPH/DIAG INJ SC/IM: CPT | Mod: S$GLB,,, | Performed by: INTERNAL MEDICINE

## 2017-11-02 PROCEDURE — 99999 PR PBB SHADOW E&M-EST. PATIENT-LVL IV: CPT | Mod: PBBFAC,,, | Performed by: INTERNAL MEDICINE

## 2017-11-02 RX ORDER — BETAMETHASONE SODIUM PHOSPHATE AND BETAMETHASONE ACETATE 3; 3 MG/ML; MG/ML
12 INJECTION, SUSPENSION INTRA-ARTICULAR; INTRALESIONAL; INTRAMUSCULAR; SOFT TISSUE ONCE
Status: COMPLETED | OUTPATIENT
Start: 2017-11-02 | End: 2017-11-02

## 2017-11-02 RX ADMIN — BETAMETHASONE SODIUM PHOSPHATE AND BETAMETHASONE ACETATE 12 MG: 3; 3 INJECTION, SUSPENSION INTRA-ARTICULAR; INTRALESIONAL; INTRAMUSCULAR; SOFT TISSUE at 04:11

## 2017-11-02 NOTE — PROGRESS NOTES
"REASON FOR VISIT:  This is a 57 year old female who comes in.  For about four   days her throat has been irritated, she is aware of a postnasal drip, she is   coughing or spitting up clear mucus, and feeling rundown and tired.  There has   been no fever.  She just got back from [facility] prior where there were   other people that were "sick."  No fever, shortness of breath, or chills.    PHYSICAL EXAMINATION:  VITAL SIGNS:  Per Epic.  HEENT:  Tympanic membranes normal.  Nasal mucosa is clear.  Oropharynx is   slightly edematous.  She does have submandibular lymphadenopathy.  LUNGS:  Clear.    IMPRESSION:  Probably viral pharyngitis.    PLAN:  Celestone 12 mg IM.  Take over-the-counter antihistamine.  Throat culture   was performed.      FRANK  dd: 11/02/2017 16:02:36 (CDT)  td: 11/03/2017 11:00:22 (CDT)  Doc ID   #[id number]  Job ID #[id number]    CC:       "

## 2017-11-04 ENCOUNTER — PATIENT MESSAGE (OUTPATIENT)
Dept: INTERNAL MEDICINE | Facility: CLINIC | Age: 57
End: 2017-11-04

## 2017-11-04 LAB — BACTERIA THROAT CULT: NORMAL

## 2017-11-06 RX ORDER — AZITHROMYCIN 250 MG/1
TABLET, FILM COATED ORAL
Qty: 6 TABLET | Refills: 0 | Status: SHIPPED | OUTPATIENT
Start: 2017-11-06 | End: 2018-03-08

## 2017-11-10 ENCOUNTER — PATIENT MESSAGE (OUTPATIENT)
Dept: INTERNAL MEDICINE | Facility: CLINIC | Age: 57
End: 2017-11-10

## 2017-11-11 DIAGNOSIS — Z78.0 MENOPAUSE: ICD-10-CM

## 2017-11-13 ENCOUNTER — PATIENT MESSAGE (OUTPATIENT)
Dept: INTERNAL MEDICINE | Facility: CLINIC | Age: 57
End: 2017-11-13

## 2017-11-13 RX ORDER — ESTRADIOL AND NORETHINDRONE ACETATE .5; .1 MG/1; MG/1
1 TABLET ORAL DAILY
Qty: 28 TABLET | Refills: 11 | Status: SHIPPED | OUTPATIENT
Start: 2017-11-13 | End: 2018-11-24 | Stop reason: SDUPTHER

## 2017-11-14 ENCOUNTER — HOSPITAL ENCOUNTER (OUTPATIENT)
Dept: RADIOLOGY | Facility: HOSPITAL | Age: 57
Discharge: HOME | End: 2017-11-14
Attending: OBSTETRICS & GYNECOLOGY
Payer: COMMERCIAL

## 2017-11-14 ENCOUNTER — PATIENT MESSAGE (OUTPATIENT)
Dept: INTERNAL MEDICINE | Facility: CLINIC | Age: 57
End: 2017-11-14

## 2017-11-14 DIAGNOSIS — Z12.31 VISIT FOR SCREENING MAMMOGRAM: ICD-10-CM

## 2017-11-14 DIAGNOSIS — Z11.51 ENCOUNTER FOR SCREENING FOR HUMAN PAPILLOMAVIRUS (HPV): ICD-10-CM

## 2017-11-14 PROCEDURE — 77067 SCR MAMMO BI INCL CAD: CPT | Mod: TC

## 2017-11-14 PROCEDURE — 77063 BREAST TOMOSYNTHESIS BI: CPT | Mod: 26,,, | Performed by: RADIOLOGY

## 2017-11-14 PROCEDURE — 77067 SCR MAMMO BI INCL CAD: CPT | Mod: 26,,, | Performed by: RADIOLOGY

## 2017-11-15 NOTE — TELEPHONE ENCOUNTER
Dr. Contreras,   Is the ANC count on this blood work. If not, that is the number that is important to the doctors at [facility]. Please place an order for this blood work if it is not on the blood draw taken today, 11/14. Please call tomorrow morning and let us know the ANC number or if Alonzo has to do another blood draw tomorrow and put orders in for that blood draw.. Please call  Alonzo or me  and let us know please.   Abiola Borjas

## 2017-11-15 NOTE — TELEPHONE ENCOUNTER
Dr. Contreras,   If Alonzo's neutrophil count is still low, his doctor will order a shot called GCSF. Can you get that and give the shot to Alonzo or does he need to go somewhere else, and if so, where should he go, like [facility]? Please respond because this is a time sensitive matter, because he got Rad [facility] last Tuesday and it is important that if neutrophil is low, he needs that GCSF right away, it was supposed to be a week from the Rad [facility].  Thank you for your help in this important matter. Any questions, please call Alonzo, [phone] or me, [phone].     Abiola Borjas

## 2017-11-30 RX ORDER — LIOTHYRONINE SODIUM 25 UG/1
TABLET ORAL
Qty: 30 TABLET | Refills: 5 | Status: SHIPPED | OUTPATIENT
Start: 2017-11-30 | End: 2018-11-26 | Stop reason: SDUPTHER

## 2017-12-15 RX ORDER — CALCIUM CITRATE/VITAMIN D3 200MG-6.25
1 TABLET ORAL 2 TIMES DAILY
Qty: 100 STRIP | Refills: 10 | Status: SHIPPED | OUTPATIENT
Start: 2017-12-15

## 2018-03-07 ENCOUNTER — TELEPHONE (OUTPATIENT)
Dept: OBSTETRICS AND GYNECOLOGY | Facility: CLINIC | Age: 58
End: 2018-03-07

## 2018-03-07 DIAGNOSIS — N95.0 PMB (POSTMENOPAUSAL BLEEDING): Primary | ICD-10-CM

## 2018-03-07 NOTE — TELEPHONE ENCOUNTER
Pt reports vaginal bleeding.  She hasn't had a period in many years.  She is on hormones.  She has not missed any pills.  Scheduled US and appt with Dr. Tejada tomorrow. Aware of US prep.

## 2018-03-07 NOTE — TELEPHONE ENCOUNTER
Swing pt calling, hasn't had a period in years and when she went to the bathroom had blood when she wiped.Pt # [phone]

## 2018-03-08 ENCOUNTER — OFFICE VISIT (OUTPATIENT)
Dept: OBSTETRICS AND GYNECOLOGY | Facility: CLINIC | Age: 58
End: 2018-03-08
Payer: COMMERCIAL

## 2018-03-08 VITALS
DIASTOLIC BLOOD PRESSURE: 84 MMHG | SYSTOLIC BLOOD PRESSURE: 136 MMHG | HEIGHT: 66 IN | BODY MASS INDEX: 31.39 KG/M2 | WEIGHT: 195.31 LBS

## 2018-03-08 DIAGNOSIS — N95.0 POSTMENOPAUSAL BLEEDING: Primary | ICD-10-CM

## 2018-03-08 LAB
BILIRUB SERPL-MCNC: NORMAL MG/DL
BLOOD URINE, POC: NORMAL
COLOR, POC UA: NORMAL
GLUCOSE UR QL STRIP: NORMAL
KETONES UR QL STRIP: NORMAL
LEUKOCYTE ESTERASE URINE, POC: NORMAL
NITRITE, POC UA: NORMAL
PH, POC UA: 5
PROTEIN, POC: NORMAL
SPECIFIC GRAVITY, POC UA: 1
UROBILINOGEN, POC UA: NORMAL

## 2018-03-08 PROCEDURE — 99213 OFFICE O/P EST LOW 20 MIN: CPT | Mod: 25,S$GLB,, | Performed by: OBSTETRICS & GYNECOLOGY

## 2018-03-08 PROCEDURE — 99999 PR PBB SHADOW E&M-EST. PATIENT-LVL III: CPT | Mod: PBBFAC,,, | Performed by: OBSTETRICS & GYNECOLOGY

## 2018-03-08 PROCEDURE — 81002 URINALYSIS NONAUTO W/O SCOPE: CPT | Mod: S$GLB,,, | Performed by: OBSTETRICS & GYNECOLOGY

## 2018-03-08 RX ORDER — ZINC GLUCONATE 50 MG
50 TABLET ORAL
COMMUNITY
End: 2018-03-08 | Stop reason: SDUPTHER

## 2018-03-08 RX ORDER — POLYETHYLENE GLYCOL 3350, SODIUM SULFATE ANHYDROUS, SODIUM BICARBONATE, SODIUM CHLORIDE, POTASSIUM CHLORIDE 236; 22.74; 6.74; 5.86; 2.97 G/4L; G/4L; G/4L; G/4L; G/4L
POWDER, FOR SOLUTION ORAL
COMMUNITY
Start: 2018-02-15 | End: 2019-08-21

## 2018-03-08 RX ORDER — TALC
5 POWDER (GRAM) TOPICAL
COMMUNITY

## 2018-03-08 RX ORDER — ALPRAZOLAM 1 MG/1
0.5 TABLET ORAL
COMMUNITY
Start: 2018-01-16 | End: 2019-04-03

## 2018-03-08 RX ORDER — ALPRAZOLAM 1 MG/1
TABLET ORAL
Refills: 0 | COMMUNITY
Start: 2018-02-19 | End: 2018-03-08 | Stop reason: SDUPTHER

## 2018-03-20 ENCOUNTER — PATIENT MESSAGE (OUTPATIENT)
Dept: INTERNAL MEDICINE | Facility: CLINIC | Age: 58
End: 2018-03-20

## 2018-03-21 ENCOUNTER — TELEPHONE (OUTPATIENT)
Dept: INTERNAL MEDICINE | Facility: CLINIC | Age: 58
End: 2018-03-21

## 2018-03-25 ENCOUNTER — PATIENT MESSAGE (OUTPATIENT)
Dept: INTERNAL MEDICINE | Facility: CLINIC | Age: 58
End: 2018-03-25

## 2018-03-26 ENCOUNTER — PATIENT MESSAGE (OUTPATIENT)
Dept: INTERNAL MEDICINE | Facility: CLINIC | Age: 58
End: 2018-03-26

## 2018-03-26 NOTE — TELEPHONE ENCOUNTER
Dr. Contreras,  I am sorry Alonzo could not make it on Thursday, can you please put the orders in for Monday 3/26. He will be at your office in the morning tomorrow. Thank you for all of your help. Please post the results asap as he has been feeling faint at times and we are scheduled to go to [place] on Tuesday. Please send to MD Rousseau also., Thank you    Aboila Borjas

## 2018-03-27 ENCOUNTER — PATIENT MESSAGE (OUTPATIENT)
Dept: INTERNAL MEDICINE | Facility: CLINIC | Age: 58
End: 2018-03-27

## 2018-03-28 NOTE — TELEPHONE ENCOUNTER
Izabela,   Thank you for being so diligent. Thanks for letting us know. We will await you posting when you receive it. Again,   Thank you,   Abiola

## 2018-04-02 ENCOUNTER — PATIENT MESSAGE (OUTPATIENT)
Dept: INTERNAL MEDICINE | Facility: CLINIC | Age: 58
End: 2018-04-02

## 2018-04-02 NOTE — TELEPHONE ENCOUNTER
Dr. Contreras,   Did you get the cortisol test results yet for Alonzo? Can you please post them.   Thank you,   Abiola

## 2018-04-03 ENCOUNTER — TELEPHONE (OUTPATIENT)
Dept: INTERNAL MEDICINE | Facility: CLINIC | Age: 58
End: 2018-04-03

## 2018-04-03 NOTE — TELEPHONE ENCOUNTER
Cortisol, Free, Serum mcg/dL 0.61    Comments: Adult Reference Ranges for Cortisol, Free,   LC/MS/MS:   8:00 - 10:00 AM      0.07-0.93 mcg/dL   4:00 -  6:00 PM      0.04-0.45 mcg/dL   10:00 - 11:00 PM      0.04-0.35 mcg/dL   This test was developed and its analytical performance characteristics   have been determined by [facility]. It has not been cleared or approved by FDA. This assay has   been validated pursuant to the CLIA regulations and is used for   clinical purposes.   Test Performed at:   [facility] [address] [place]  [address]     TADEO Mensah MD, PhD, BEBA

## 2018-04-05 ENCOUNTER — OFFICE VISIT (OUTPATIENT)
Dept: INTERNAL MEDICINE | Facility: CLINIC | Age: 58
End: 2018-04-05
Payer: COMMERCIAL

## 2018-04-05 VITALS
SYSTOLIC BLOOD PRESSURE: 115 MMHG | HEIGHT: 66 IN | BODY MASS INDEX: 31.36 KG/M2 | WEIGHT: 195.13 LBS | DIASTOLIC BLOOD PRESSURE: 78 MMHG | HEART RATE: 80 BPM | TEMPERATURE: 99 F

## 2018-04-05 DIAGNOSIS — J02.9 PHARYNGITIS, UNSPECIFIED ETIOLOGY: Primary | ICD-10-CM

## 2018-04-05 PROCEDURE — 96372 THER/PROPH/DIAG INJ SC/IM: CPT | Mod: S$GLB,,, | Performed by: INTERNAL MEDICINE

## 2018-04-05 PROCEDURE — 99213 OFFICE O/P EST LOW 20 MIN: CPT | Mod: 25,S$GLB,, | Performed by: INTERNAL MEDICINE

## 2018-04-05 PROCEDURE — 99999 PR PBB SHADOW E&M-EST. PATIENT-LVL IV: CPT | Mod: PBBFAC,,, | Performed by: INTERNAL MEDICINE

## 2018-04-05 RX ORDER — ALPRAZOLAM 0.5 MG
TABLET ORAL
Refills: 0 | COMMUNITY
Start: 2018-03-23 | End: 2018-12-12

## 2018-04-05 RX ORDER — TRIAMCINOLONE ACETONIDE 40 MG/ML
60 INJECTION, SUSPENSION INTRA-ARTICULAR; INTRAMUSCULAR
Status: COMPLETED | OUTPATIENT
Start: 2018-04-05 | End: 2018-04-05

## 2018-04-05 RX ADMIN — TRIAMCINOLONE ACETONIDE 60 MG: 40 INJECTION, SUSPENSION INTRA-ARTICULAR; INTRAMUSCULAR at 04:04

## 2018-04-05 NOTE — PROGRESS NOTES
REASON FOR VISIT:  This is a 57-year-old female.  Two days ago, she woke up with   a sore throat, yesterday she was better, but then last night started having a   sore throat again and coughing.  She is aware of a sinus drip.  She is not   coughing up any mucus right now.  No shortness of breath, chest pain or fever.    MEDICAL HISTORY:  Hypothyroid.  She was recently at [facility] with her  and was under some stress and   a lot of times this would tend to happen.    PHYSICAL EXAMINATION:  VITAL SIGNS:  Per EPIC.  She is afebrile.  HEENT:  Tympanic membranes normal.  Nasal mucosa is slightly edematous.    Oropharynx slightly hyperemic and lymphoid vesicle noted, slight swollen   submandibular lymph gland.  LUNGS:  Clear.  HEART:  Regular rate and rhythm.    IMPRESSION:  Pharyngitis.    PLAN:  Kenalog 60 mg IM.  She will keep me informed of progress.  She can take   over-the-counter allergy antihistamine.        /eamon [id number] review        KENNETH/RACH  dd: 04/05/2018 16:49:25 (CDT)  td: 04/06/2018 10:13:46 (CDT)  Doc ID   #[id number]  Job ID #[id number]    CC:

## 2018-04-09 ENCOUNTER — OFFICE VISIT (OUTPATIENT)
Dept: INTERNAL MEDICINE | Facility: CLINIC | Age: 58
End: 2018-04-09
Payer: COMMERCIAL

## 2018-04-09 ENCOUNTER — PATIENT MESSAGE (OUTPATIENT)
Dept: INTERNAL MEDICINE | Facility: CLINIC | Age: 58
End: 2018-04-09

## 2018-04-09 ENCOUNTER — TELEPHONE (OUTPATIENT)
Dept: INTERNAL MEDICINE | Facility: CLINIC | Age: 58
End: 2018-04-09

## 2018-04-09 VITALS
BODY MASS INDEX: 31.11 KG/M2 | DIASTOLIC BLOOD PRESSURE: 71 MMHG | TEMPERATURE: 98 F | HEIGHT: 66 IN | HEART RATE: 71 BPM | SYSTOLIC BLOOD PRESSURE: 117 MMHG | WEIGHT: 193.56 LBS

## 2018-04-09 DIAGNOSIS — J20.9 ACUTE BRONCHITIS, UNSPECIFIED ORGANISM: Primary | ICD-10-CM

## 2018-04-09 LAB
FLUAV AG SPEC QL IA: NEGATIVE
FLUBV AG SPEC QL IA: NEGATIVE
SPECIMEN SOURCE: NORMAL

## 2018-04-09 PROCEDURE — 99999 PR PBB SHADOW E&M-EST. PATIENT-LVL III: CPT | Mod: PBBFAC,,, | Performed by: INTERNAL MEDICINE

## 2018-04-09 PROCEDURE — 99213 OFFICE O/P EST LOW 20 MIN: CPT | Mod: S$GLB,,, | Performed by: INTERNAL MEDICINE

## 2018-04-09 PROCEDURE — 87400 INFLUENZA A/B EACH AG IA: CPT | Mod: 59

## 2018-04-09 RX ORDER — PREDNISONE 20 MG/1
TABLET ORAL
Qty: 12 TABLET | Refills: 0 | Status: SHIPPED | OUTPATIENT
Start: 2018-04-09 | End: 2018-04-09

## 2018-04-09 RX ORDER — TRIAMCINOLONE ACETONIDE 40 MG/ML
40 INJECTION, SUSPENSION INTRA-ARTICULAR; INTRAMUSCULAR
Status: DISCONTINUED | OUTPATIENT
Start: 2018-04-09 | End: 2018-04-10

## 2018-04-09 NOTE — PROGRESS NOTES
REASON FOR VISIT:  This is a 57-year-old female.  In reference to her visit last   week, April 5th, she is now getting congested in her chest.  She is coughing up   yellow mucus, at times will feel tight and has some wheezing.  She is feeling a   little bit better as far as congestion in the head and throat.  She did receive   a Celestone injection.    PAST MEDICAL HISTORY:  Hypothyroid disease.  Diverticular disease of the colon.  Fatty liver.  Nodular hyperplasia.    MEDICATIONS:  List per MedCard.    PHYSICAL EXAMINATION:  VITAL SIGNS:  Per Epic.  Temperature is 98.3.  HEENT:  Tympanic membranes look normal.  Nasal mucosa is slightly edematous.    Oropharynx is edematous.  NECK:  No adenopathy.  LUNGS:  Clear with coarse breath sounds with expiration and forced expiration.  HEART:  Regular rate and rhythm.    IMPRESSION:  Acute bronchitis.    PLAN:  We will treat with Z-SOFIA, prednisone over a 6 to 8 period.  Continue with   Mucinex.              /eamon [id number] blank(s)        JAM/RACH  dd: 04/09/2018 12:14:43 (CDT)  td: 04/10/2018 07:49:00 (CDT)  Doc ID   #[id number]  Job ID #[id number]    CC:

## 2018-04-09 NOTE — TELEPHONE ENCOUNTER
Called pt back and she states that she picked up the prednisone and she says that she feels as if the dosage is too high and she is not comfortable taking such a high dose and would like to know if Dr Contreras can call her and discuss further with her or change the dosage of the prednisone

## 2018-04-09 NOTE — TELEPHONE ENCOUNTER
----- Message from Anne Lainez sent at 4/9/2018 12:48 PM CDT -----  Contact: call pt [phone]  Calling with questions about prednisone RX she just picked up from the pharmacy...  patient is requesting a phone call asap

## 2018-04-09 NOTE — TELEPHONE ENCOUNTER
----- Message from Adriana Orozco sent at 4/9/2018  3:42 PM CDT -----  Contact: Patient [phone]  Patient was seen earlier today and would like to speak to you concerning the Rx that were given today.    Please call and advise.    Thank You

## 2018-04-10 ENCOUNTER — OFFICE VISIT (OUTPATIENT)
Dept: INTERNAL MEDICINE | Facility: CLINIC | Age: 58
End: 2018-04-10
Payer: COMMERCIAL

## 2018-04-10 ENCOUNTER — PATIENT MESSAGE (OUTPATIENT)
Dept: INTERNAL MEDICINE | Facility: CLINIC | Age: 58
End: 2018-04-10

## 2018-04-10 ENCOUNTER — HOSPITAL ENCOUNTER (OUTPATIENT)
Dept: RADIOLOGY | Facility: HOSPITAL | Age: 58
Discharge: HOME | End: 2018-04-10
Attending: INTERNAL MEDICINE
Payer: COMMERCIAL

## 2018-04-10 VITALS
DIASTOLIC BLOOD PRESSURE: 75 MMHG | SYSTOLIC BLOOD PRESSURE: 127 MMHG | HEIGHT: 66 IN | BODY MASS INDEX: 30.62 KG/M2 | TEMPERATURE: 99 F | WEIGHT: 190.5 LBS | HEART RATE: 77 BPM

## 2018-04-10 DIAGNOSIS — J40 BRONCHITIS: Primary | ICD-10-CM

## 2018-04-10 DIAGNOSIS — J32.9 CHRONIC SINUSITIS, UNSPECIFIED LOCATION: Primary | ICD-10-CM

## 2018-04-10 DIAGNOSIS — J40 BRONCHITIS: ICD-10-CM

## 2018-04-10 PROCEDURE — 87070 CULTURE OTHR SPECIMN AEROBIC: CPT

## 2018-04-10 PROCEDURE — 99214 OFFICE O/P EST MOD 30 MIN: CPT | Mod: 25,S$GLB,, | Performed by: INTERNAL MEDICINE

## 2018-04-10 PROCEDURE — 71046 X-RAY EXAM CHEST 2 VIEWS: CPT | Mod: TC,FY,PO

## 2018-04-10 PROCEDURE — 71046 X-RAY EXAM CHEST 2 VIEWS: CPT | Mod: 26,,, | Performed by: RADIOLOGY

## 2018-04-10 PROCEDURE — 99999 PR PBB SHADOW E&M-EST. PATIENT-LVL III: CPT | Mod: PBBFAC,,, | Performed by: INTERNAL MEDICINE

## 2018-04-10 PROCEDURE — 96372 THER/PROPH/DIAG INJ SC/IM: CPT | Mod: S$GLB,,, | Performed by: INTERNAL MEDICINE

## 2018-04-10 RX ORDER — OSELTAMIVIR PHOSPHATE 75 MG/1
75 CAPSULE ORAL 2 TIMES DAILY
Qty: 10 CAPSULE | Refills: 0 | Status: SHIPPED | OUTPATIENT
Start: 2018-04-10 | End: 2018-04-15

## 2018-04-10 RX ORDER — ONDANSETRON 4 MG/1
4 TABLET, FILM COATED ORAL EVERY 6 HOURS PRN
Qty: 20 TABLET | Refills: 0 | Status: SHIPPED | OUTPATIENT
Start: 2018-04-10 | End: 2018-12-12

## 2018-04-10 RX ORDER — TRIAMCINOLONE ACETONIDE 40 MG/ML
60 INJECTION, SUSPENSION INTRA-ARTICULAR; INTRAMUSCULAR ONCE
Status: COMPLETED | OUTPATIENT
Start: 2018-04-10 | End: 2018-04-10

## 2018-04-10 RX ORDER — PROMETHAZINE HYDROCHLORIDE 25 MG/1
25 TABLET ORAL EVERY 6 HOURS PRN
Qty: 20 TABLET | Refills: 0 | Status: SHIPPED | OUTPATIENT
Start: 2018-04-10 | End: 2018-12-12

## 2018-04-10 RX ADMIN — TRIAMCINOLONE ACETONIDE 60 MG: 40 INJECTION, SUSPENSION INTRA-ARTICULAR; INTRAMUSCULAR at 12:04

## 2018-04-10 NOTE — TELEPHONE ENCOUNTER
Dr. Contreras,   Have you gotten the results of the flu test from yesterday? Also, I hope I am seeing you at 10:30 today.     Abiola

## 2018-04-10 NOTE — PROGRESS NOTES
REASON FOR VISIT:  This is a 57-year-old female who I saw yesterday.  She feels   actually worse today compared to yesterday where now she feels more of a   pressure sensation and congestion in her head.  She feels she might have a fever   in the sense that her temperature here is different than what she gets at home.    Normally she is 96-97, but today and yesterday she is 98.7.  She feels   pressure across her face.  She is having some congestion.  Her ears feel full.    She is not blowing out any phlegm anymore.  Actually she feels better in the   lungs.    PHYSICAL EXAMINATION:  VITAL SIGNS:  Pulse rate at first 77 then 92, temperature 98.7, blood pressure   125/70.  HEENT:  Tympanic membranes normal.  Nasal mucosa is edematous and swollen.    Oropharynx hyperemic.  NECK:  I can palpate a right submandibular lymph gland.  LUNGS:  Clear.  HEART:  Regular rate and rhythm.    She had a chest x-ray that showed no pneumonia.    IMPRESSION:  Persistent sinusitis.    PLAN:  She started the Z-SOFIA yesterday.  She deferred on taking the prednisone   that was called in yesterday, but will be agreeable to get a Kenalog injection   60 mg IM and she persistently asks if she has influenza.  Clinically, it is   going to be hard to make this diagnosis.  Her blood count is pending.  Nasal   culture yesterday was negative for influenza, but I do not think it will be a   problem for her to take Tamiflu for five days along with a Z-SOFIA.          /eamon [id number] review        JAM/RACH  dd: 04/10/2018 12:24:23 (CDT)  td: 04/11/2018 10:56:58 (CDT)  Doc ID   #[id number]  Job ID #[id number]    CC:

## 2018-04-13 LAB — BACTERIA THROAT CULT: NORMAL

## 2018-06-12 RX ORDER — LEVOTHYROXINE SODIUM 88 UG/1
88 TABLET ORAL
Qty: 30 TABLET | Refills: 10 | Status: SHIPPED | OUTPATIENT
Start: 2018-06-12 | End: 2019-06-09 | Stop reason: SDUPTHER

## 2018-11-24 DIAGNOSIS — Z78.0 MENOPAUSE: ICD-10-CM

## 2018-11-26 RX ORDER — LIOTHYRONINE SODIUM 25 UG/1
TABLET ORAL
Qty: 30 TABLET | Refills: 5 | Status: SHIPPED | OUTPATIENT
Start: 2018-11-26 | End: 2019-08-27 | Stop reason: SDUPTHER

## 2018-11-26 RX ORDER — ESTRADIOL AND NORETHINDRONE ACETATE .5; .1 MG/1; MG/1
1 TABLET ORAL DAILY
Qty: 28 TABLET | Refills: 2 | Status: SHIPPED | OUTPATIENT
Start: 2018-11-26 | End: 2019-04-07 | Stop reason: SDUPTHER

## 2018-12-12 ENCOUNTER — OFFICE VISIT (OUTPATIENT)
Dept: OBSTETRICS AND GYNECOLOGY | Facility: CLINIC | Age: 58
End: 2018-12-12
Payer: COMMERCIAL

## 2018-12-12 VITALS
HEIGHT: 66 IN | SYSTOLIC BLOOD PRESSURE: 124 MMHG | BODY MASS INDEX: 33.15 KG/M2 | WEIGHT: 206.25 LBS | DIASTOLIC BLOOD PRESSURE: 78 MMHG

## 2018-12-12 DIAGNOSIS — Z78.0 MENOPAUSE: ICD-10-CM

## 2018-12-12 DIAGNOSIS — Z01.419 ENCOUNTER FOR GYNECOLOGICAL EXAMINATION: ICD-10-CM

## 2018-12-12 DIAGNOSIS — Z12.31 ENCOUNTER FOR SCREENING MAMMOGRAM FOR MALIGNANT NEOPLASM OF BREAST: Primary | ICD-10-CM

## 2018-12-12 PROCEDURE — 99999 PR PBB SHADOW E&M-EST. PATIENT-LVL III: CPT | Mod: PBBFAC,,, | Performed by: OBSTETRICS & GYNECOLOGY

## 2018-12-12 PROCEDURE — 99396 PREV VISIT EST AGE 40-64: CPT | Mod: S$GLB,,, | Performed by: OBSTETRICS & GYNECOLOGY

## 2018-12-12 RX ORDER — ESTRADIOL AND NORETHINDRONE ACETATE .5; .1 MG/1; MG/1
1 TABLET ORAL DAILY
Qty: 28 TABLET | Refills: 11 | Status: CANCELLED | OUTPATIENT
Start: 2018-12-12

## 2018-12-12 RX ORDER — PROGESTERONE 100 MG/1
100 CAPSULE ORAL DAILY
Qty: 30 CAPSULE | Refills: 11 | Status: SHIPPED | OUTPATIENT
Start: 2018-12-12 | End: 2019-04-03 | Stop reason: CLARIF

## 2018-12-12 RX ORDER — ESTRADIOL 1 MG/1
1 TABLET ORAL DAILY
Qty: 30 TABLET | Refills: 11 | Status: SHIPPED | OUTPATIENT
Start: 2018-12-12 | End: 2019-04-03 | Stop reason: CLARIF

## 2018-12-12 NOTE — PROGRESS NOTES
Subjective:       Patient ID: Abiola Borjas is a 58 y.o. female.    Chief Complaint:  Well Woman (annual; last pap/hpv 10/26/17 neg; last mmg 17 birads 1; colonoscopy , benign polyp; dexa  wnl)      History of Present Illness.  Abiola Borjas is a 58 y.o. female.  She has no breast or urinary symptoms.  She has no postcoital bleeding, pelvic pain or vaginal discharge.    GYN & OB History  No LMP recorded (lmp unknown). Patient is postmenopausal.   Pap: 10/31/2017  Normal HPV negative  Mammogram: 17 Normal  Colonoscopy:  benign polyp  DEXA:  Normal    OB History    Para Term  AB Living   3 3 3     3   SAB TAB Ectopic Multiple Live Births                  # Outcome Date GA Lbr Mark/2nd Weight Sex Delivery Anes PTL Lv   3 Term      CS-LTranv         Complications: Fetal Intolerance   2 Term      CS-LTranv         Complications: Breech presentation   1 Term      CS-LTranv             Past Medical History:   Diagnosis Date    Anxiety     Deviated septum     Diverticulosis of sigmoid colon     Focal nodular hyperplasia of liver     Hypoglycemia     PONV (postoperative nausea and vomiting)     Snores     Thyroid disease      Past Surgical History:   Procedure Laterality Date    ABDOMINAL HERNIA REPAIR      CATHETERIZATION-URETERAL Left 10/1/2014    [facility] OR 2ND FLR     SECTION      CHOLECYSTECTOMY      COLECTOMY  2015    MD Rousseau for stricture    COLON SURGERY      COLONOSCOPY N/A 9/15/2015    Performed by Pato Gu[facility] at [facility] ENDO (2ND FLR)    COLONOSCOPY N/A 9/15/2015    Performed by [facility] at [facility] ENDO (4TH FLR)    COLONOSCOPY N/A 2014    [facility] ENDO (2ND FLR)    COLONOSCOPY N/A 2014    [facility] ENDO (4TH FLR)    COLONOSCOPY with covered stent placement N/A 10/29/2014    Performed by [facility]  at [facility] ENDO (2ND FLR)    HERNIA REPAIR      SIGMOIDECTOMY-LAPAROSCOPIC N/A 10/1/2014    Performed [facility] at [facility] OR 2ND FLR     Family History   Problem Relation Age of Onset    Diabetes Mother     Hypertension Mother     Cancer Father         kidney    Depression Sister     Breast cancer Paternal Aunt 70    Colon cancer Neg Hx     Miscarriages / Stillbirths Neg Hx      Social History     Tobacco Use    Smoking status: Never Smoker    Smokeless tobacco: Never Used   Substance Use Topics    Alcohol use: Yes     Alcohol/week: 0.0 oz     Comment: occasional    Drug use: No       Current Outpatient Medications:     ALPRAZolam (XANAX) 1 MG tablet, Take 0.5 mg by mouth., Disp: , Rfl:     CYTOMEL 25 mcg Tab, TAKE 1/2 TABLET BY MOUTH EVERY EVENING, Disp: 30 tablet, Rfl: 5    diphenhydrAMINE (BENADRYL) 25 mg capsule, Take 12.5 mg by mouth., Disp: , Rfl:     estradiol-norethindrone acet 0.5-0.1 mg per tablet, TAKE 1 TABLET BY MOUTH ONCE DAILY. PLEASE KEEP ANNUAL APPOINTMENT FOR ADDITIONAL REFILLS, Disp: 28 tablet, Rfl: 2    fluticasone (FLONASE) 50 mcg/actuation nasal spray, 2 sprays by Nasal route., Disp: , Rfl:     ibuprofen (ADVIL,MOTRIN) 200 MG tablet, Take 200 mg by mouth., Disp: , Rfl:     lancets Misc, 1 Device by Misc.(Non-Drug; Combo Route) route 2 (two) times daily., Disp: 100 each, Rfl: 11    melatonin 3 mg Tab, Take 5 mg by mouth., Disp: , Rfl:     multivitamin capsule, Take 1 capsule by mouth once daily., Disp: , Rfl:     SYNTHROID 88 mcg tablet, TAKE 1 TABLET (88 MCG TOTAL) BY MOUTH BEFORE BREAKFAST., Disp: 30 tablet, Rfl: 10    TRUE METRIX GLUCOSE TEST STRIP Strp, 1 STRIP BY MISC.(NON-DRUG COMBO ROUTE) ROUTE 2 (TWO) TIMES DAILY., Disp: 100 strip, Rfl: 10    zinc gluconate 50 mg tablet, Take 50 mg by mouth once daily., Disp: , Rfl:     estradiol (ESTRACE) 1 MG tablet, Take 1 tablet (1 mg total) by mouth once daily. Every morning, Disp: 30 tablet, Rfl: 11     "polyethylene glycol (GOLYTELY,NULYTELY) 236-22.74-6.74 -5.86 gram suspension, Use as directed by ordering provider., Disp: , Rfl:     progesterone (PROMETRIUM) 100 MG capsule, Take 1 capsule (100 mg total) by mouth once daily. 30-60 minutes before bed, Disp: 30 capsule, Rfl: 11    Review of patient's allergies indicates:   Allergen Reactions    Cephalosporins      Other reaction(s): Swelling    Penicillin g      Other reaction(s): Swelling       Review of Systems  Review of Systems   Constitutional: Negative for fatigue.   HENT: Negative for trouble swallowing.    Eyes: Negative for visual disturbance.   Respiratory: Negative for cough and shortness of breath.    Cardiovascular: Negative for chest pain.   Gastrointestinal: Negative for abdominal distention, abdominal pain, blood in stool, nausea and vomiting.   Genitourinary: Negative for difficulty urinating, dyspareunia, dysuria, flank pain, frequency, hematuria, pelvic pain, urgency, vaginal bleeding, vaginal discharge and vaginal pain.   Musculoskeletal: Negative for arthralgias.   Skin: Negative for rash.   Neurological: Negative for dizziness and headaches.   Psychiatric/Behavioral: Negative for sleep disturbance. The patient is not nervous/anxious.         Objective:     Vitals:    12/12/18 1137   BP: 124/78   Weight: 93.6 kg (206 lb 3.9 oz)   Height: 5' 6" (1.676 m)   PainSc: 0-No pain     Body mass index is 33.29 kg/m².    Physical Exam:   Constitutional: She is oriented to person, place, and time. Vital signs are normal. She appears well-developed and well-nourished.    HENT:   Head: Normocephalic.     Neck: Normal range of motion. No thyromegaly present.     Pulmonary/Chest: Right breast exhibits no mass, no nipple discharge, no skin change, no tenderness and no swelling. Left breast exhibits no mass, no nipple discharge, no skin change, no tenderness and no swelling. Breasts are symmetrical.        Abdominal: Soft. Normal appearance and bowel sounds " are normal. She exhibits no distension. There is no tenderness.     Genitourinary: Vagina normal and uterus normal. Pelvic exam was performed with patient supine. There is no rash, tenderness, lesion or injury on the right labia. There is no rash, tenderness, lesion or injury on the left labia. Cervix is normal. Right adnexum displays no mass, no tenderness and no fullness. Left adnexum displays no mass, no tenderness and no fullness. No erythema in the vagina. No vaginal discharge found. Cervix exhibits no motion tenderness and no discharge.           Musculoskeletal: Normal range of motion.      Lymphadenopathy:        Right: No inguinal and no supraclavicular adenopathy present.        Left: No inguinal and no supraclavicular adenopathy present.    Neurological: She is alert and oriented to person, place, and time.    Skin: Skin is warm and dry.    Psychiatric: She has a normal mood and affect.        Assessment/ Plan:     Encounter for screening mammogram for malignant neoplasm of breast  -     Mammo Digital Screening Bilat w/ Jamaal; Future    Menopause  -     estradiol (ESTRACE) 1 MG tablet; Take 1 tablet (1 mg total) by mouth once daily. Every morning  Dispense: 30 tablet; Refill: 11  -     progesterone (PROMETRIUM) 100 MG capsule; Take 1 capsule (100 mg total) by mouth once daily. 30-60 minutes before bed  Dispense: 30 capsule; Refill: 11    Other orders  -     Cancel: estradiol-norethindrone acet 0.5-0.1 mg per tablet; Take 1 tablet by mouth once daily.  Dispense: 28 tablet; Refill: 11      D/C Activella  Routine pap smears.  Self breast exam and mammography discussed  Routine colonoscopy discussed.  Diet and exercise discussed.  Recommend calcium 1200 mg and vitamin D 600 units daily and routine bone mineral density testing.  Yearly influenza vaccination discussed.  Follow-up with me in 2-3 months to discuss BHRT further.

## 2019-02-07 ENCOUNTER — TELEPHONE (OUTPATIENT)
Dept: OBSTETRICS AND GYNECOLOGY | Facility: CLINIC | Age: 59
End: 2019-02-07

## 2019-02-07 RX ORDER — ESTRADIOL 0.5 MG/1
0.5 TABLET ORAL DAILY
Qty: 30 TABLET | Refills: 11 | Status: SHIPPED | OUTPATIENT
Start: 2019-02-07 | End: 2019-04-03 | Stop reason: CLARIF

## 2019-02-07 NOTE — TELEPHONE ENCOUNTER
Dr Tejada pt calling, pt had hormone medication switched on 12-12-18 and wants to go back to the original meds Estradiol 0.5mg also out of them.Pt # [phone] pharm # [phone]

## 2019-03-13 ENCOUNTER — TELEPHONE (OUTPATIENT)
Dept: OBSTETRICS AND GYNECOLOGY | Facility: CLINIC | Age: 59
End: 2019-03-13

## 2019-03-13 NOTE — TELEPHONE ENCOUNTER
Pt lost  in October. She was on estradiol-norethindrone 0.5-0.1mg for years and when she came in on 12/12/18 Dr. Tejada recommended pt do BHRT and changed her to estradiol and progesterone. Pt states that she feels more emotional and doesn't know if it related to the switch or not but does not want to continue them and has resumed taking her original synthetic Rx. She just wanted Dr. Tejada to be aware of this.

## 2019-03-13 NOTE — TELEPHONE ENCOUNTER
Dr. Tejada pt called saying that she just wanted Dr. Tejada to know that the Progesterone made her extremely emotional and she is now on a low dose synthetic.

## 2019-04-03 ENCOUNTER — OFFICE VISIT (OUTPATIENT)
Dept: INTERNAL MEDICINE | Facility: CLINIC | Age: 59
End: 2019-04-03
Payer: COMMERCIAL

## 2019-04-03 VITALS
OXYGEN SATURATION: 97 % | BODY MASS INDEX: 31.82 KG/M2 | SYSTOLIC BLOOD PRESSURE: 132 MMHG | WEIGHT: 198 LBS | DIASTOLIC BLOOD PRESSURE: 66 MMHG | HEART RATE: 84 BPM | HEIGHT: 66 IN

## 2019-04-03 DIAGNOSIS — J06.9 UPPER RESPIRATORY TRACT INFECTION, UNSPECIFIED TYPE: Primary | ICD-10-CM

## 2019-04-03 DIAGNOSIS — R05.9 COUGH: ICD-10-CM

## 2019-04-03 PROCEDURE — 99999 PR PBB SHADOW E&M-EST. PATIENT-LVL III: ICD-10-PCS | Mod: PBBFAC,,, | Performed by: INTERNAL MEDICINE

## 2019-04-03 PROCEDURE — 96372 PR INJECTION,THERAP/PROPH/DIAG2ST, IM OR SUBCUT: ICD-10-PCS | Mod: S$GLB,,, | Performed by: INTERNAL MEDICINE

## 2019-04-03 PROCEDURE — 99999 PR PBB SHADOW E&M-EST. PATIENT-LVL III: CPT | Mod: PBBFAC,,, | Performed by: INTERNAL MEDICINE

## 2019-04-03 PROCEDURE — 99213 PR OFFICE/OUTPT VISIT, EST, LEVL III, 20-29 MIN: ICD-10-PCS | Mod: 25,S$GLB,, | Performed by: INTERNAL MEDICINE

## 2019-04-03 PROCEDURE — 3008F BODY MASS INDEX DOCD: CPT | Mod: CPTII,S$GLB,, | Performed by: INTERNAL MEDICINE

## 2019-04-03 PROCEDURE — 3008F PR BODY MASS INDEX (BMI) DOCUMENTED: ICD-10-PCS | Mod: CPTII,S$GLB,, | Performed by: INTERNAL MEDICINE

## 2019-04-03 PROCEDURE — 99213 OFFICE O/P EST LOW 20 MIN: CPT | Mod: 25,S$GLB,, | Performed by: INTERNAL MEDICINE

## 2019-04-03 PROCEDURE — 96372 THER/PROPH/DIAG INJ SC/IM: CPT | Mod: S$GLB,,, | Performed by: INTERNAL MEDICINE

## 2019-04-03 RX ORDER — TRIAMCINOLONE ACETONIDE 40 MG/ML
60 INJECTION, SUSPENSION INTRA-ARTICULAR; INTRAMUSCULAR ONCE
Status: COMPLETED | OUTPATIENT
Start: 2019-04-03 | End: 2019-04-03

## 2019-04-03 RX ORDER — AZITHROMYCIN 250 MG/1
TABLET, FILM COATED ORAL
Qty: 6 TABLET | Refills: 0 | Status: SHIPPED | OUTPATIENT
Start: 2019-04-03 | End: 2019-04-09

## 2019-04-03 RX ORDER — ALPRAZOLAM 0.5 MG/1
0.5 TABLET ORAL 3 TIMES DAILY PRN
COMMUNITY

## 2019-04-03 RX ADMIN — TRIAMCINOLONE ACETONIDE 60 MG: 40 INJECTION, SUSPENSION INTRA-ARTICULAR; INTRAMUSCULAR at 05:04

## 2019-04-03 NOTE — PROGRESS NOTES
REASON FOR VISIT:  This is a 58-year-old female.  Since yesterday, she started   getting congested in the chest and coughing.  She might hear wheezing or   whistling when lying down at night.  She does not feel short of breath.  No   fever.  Throat is slightly irritated and might be because of a postnasal drip.    She started taking Mucinex with phenylephrine.  Cough has gotten a little bit   better taking it, but still has the symptoms.  Last year this time, she had a   very similar situation that was prolonged and was diagnosed with bronchitis.    MEDICAL HISTORY:  1. 1.  Hypothyroid.  2. 2.  Focal nodular hyperplasia of the liver.    MEDICATION LIST:  Per MedCard.    PHYSICAL EXAMINATION:  VITAL SIGNS:  Per EPIC.  HEENT:  Tympanic membranes normal.  Nasal mucosa is swollen and congested.    Oropharynx, hyperemic.  NECK:  A slightly tender right submandibular lymph node.  LUNGS:  Clear.  HEART:  Regular rate and rhythm.    1. IMPRESSION:  Upper respiratory infection with cough.    PLAN:  Kenalog 60 mg IM, she responded well to this last year and the use of   Zithromax Z-SOFIA.      JAM/HN  dd: 04/03/2019 17:14:45 (CDT)  td: 04/04/2019 02:26:23 (CDT)  Doc ID   #[id number]  Job ID #[id number]    CC:

## 2019-04-07 DIAGNOSIS — Z78.0 MENOPAUSE: ICD-10-CM

## 2019-04-08 RX ORDER — ESTRADIOL AND NORETHINDRONE ACETATE .5; .1 MG/1; MG/1
1 TABLET ORAL DAILY
Qty: 28 TABLET | Refills: 9 | Status: SHIPPED | OUTPATIENT
Start: 2019-04-08 | End: 2019-12-03 | Stop reason: SDUPTHER

## 2019-04-09 ENCOUNTER — OFFICE VISIT (OUTPATIENT)
Dept: URGENT CARE | Facility: CLINIC | Age: 59
End: 2019-04-09
Payer: COMMERCIAL

## 2019-04-09 VITALS
OXYGEN SATURATION: 98 % | BODY MASS INDEX: 31.82 KG/M2 | HEIGHT: 66 IN | TEMPERATURE: 99 F | SYSTOLIC BLOOD PRESSURE: 125 MMHG | DIASTOLIC BLOOD PRESSURE: 79 MMHG | RESPIRATION RATE: 20 BRPM | WEIGHT: 198 LBS | HEART RATE: 77 BPM

## 2019-04-09 DIAGNOSIS — J06.9 UPPER RESPIRATORY TRACT INFECTION, UNSPECIFIED TYPE: Primary | ICD-10-CM

## 2019-04-09 LAB
CTP QC/QA: YES
FLUAV AG NPH QL: NEGATIVE
FLUBV AG NPH QL: NEGATIVE

## 2019-04-09 PROCEDURE — 99214 PR OFFICE/OUTPT VISIT, EST, LEVL IV, 30-39 MIN: ICD-10-PCS | Mod: S$GLB,,, | Performed by: FAMILY MEDICINE

## 2019-04-09 PROCEDURE — 87804 POCT INFLUENZA A/B: ICD-10-PCS | Mod: 59,QW,S$GLB, | Performed by: FAMILY MEDICINE

## 2019-04-09 PROCEDURE — 3008F PR BODY MASS INDEX (BMI) DOCUMENTED: ICD-10-PCS | Mod: CPTII,S$GLB,, | Performed by: FAMILY MEDICINE

## 2019-04-09 PROCEDURE — 87804 INFLUENZA ASSAY W/OPTIC: CPT | Mod: QW,S$GLB,, | Performed by: FAMILY MEDICINE

## 2019-04-09 PROCEDURE — 3008F BODY MASS INDEX DOCD: CPT | Mod: CPTII,S$GLB,, | Performed by: FAMILY MEDICINE

## 2019-04-09 PROCEDURE — 99214 OFFICE O/P EST MOD 30 MIN: CPT | Mod: S$GLB,,, | Performed by: FAMILY MEDICINE

## 2019-04-09 NOTE — PATIENT INSTRUCTIONS
Viral Upper Respiratory Illness (Adult)  You have a viral upper respiratory illness (URI), which is another term for the common cold. This illness is contagious during the first few days. It is spread through the air by coughing and sneezing. It may also be spread by direct contact (touching the sick person and then touching your own eyes, nose, or mouth). Frequent handwashing will decrease risk of spread. Most viral illnesses go away within 7 to 10 days with rest and simple home remedies. Sometimes the illness may last for several weeks. Antibiotics will not kill a virus, and they are generally not prescribed for this condition.    Home care  · If symptoms are severe, rest at home for the first 2 to 3 days. When you resume activity, don't let yourself get too tired.  · Avoid being exposed to cigarette smoke (yours or others).  · You may use acetaminophen or ibuprofen to control pain and fever, unless another medicine was prescribed. (Note: If you have chronic liver or kidney disease, have ever had a stomach ulcer or gastrointestinal bleeding, or are taking blood-thinning medicines, talk with your healthcare provider before using these medicines.) Aspirin should never be given to anyone under 18 years of age who is ill with a viral infection or fever. It may cause severe liver or brain damage.  · Your appetite may be poor, so a light diet is fine. Avoid dehydration by drinking 6 to 8 glasses of fluids per day (water, soft drinks, juices, tea, or soup). Extra fluids will help loosen secretions in the nose and lungs.  · Over-the-counter cold medicines will not shorten the length of time youre sick, but they may be helpful for the following symptoms: cough, sore throat, and nasal and sinus congestion. (Note: Do not use decongestants if you have high blood pressure.)  Follow-up care  Follow up with your healthcare provider, or as advised.  When to seek medical advice  Call your healthcare provider right away if any  of these occur:  · Cough with lots of colored sputum (mucus)  · Severe headache; face, neck, or ear pain  · Difficulty swallowing due to throat pain  · Fever of 100.4°F (38°C)  Call 911, or get immediate medical care  Call emergency services right away if any of these occur:  · Chest pain, shortness of breath, wheezing, or difficulty breathing  · Coughing up blood  · Inability to swallow due to throat pain  Date Last Reviewed: 9/13/2015  © 8036-9626 [employer]. [address] [place] [address]. All rights reserved. This information is not intended as a substitute for professional medical care. Always follow your healthcare professional's instructions.

## 2019-04-09 NOTE — PROGRESS NOTES
"Subjective:       Patient ID: Abiola Bojras is a 58 y.o. female.    Vitals:  height is 5' 6" (1.676 m) and weight is 89.8 kg (198 lb). Her oral temperature is 98.5 °F (36.9 °C). Her blood pressure is 125/79 and her pulse is 77. Her respiration is 20 and oxygen saturation is 98%.     Chief Complaint: URI    This is a 58 y.o. female who presents today with a chief complaint of URI which started last Tuesday, 1 week ago.  Patient was seen by her PCP on Wednesday.  She was diagnosed with a URI.  She was given a steroid shot and a Z-Pack.  She started to feel better, then relapsed on Monday.  She has a headahce, fatigue, south, sinus pain/pressure, and post nasal drip.   She has been taking Mucinex.  Patient is asking to be tested for the flu.     URI    This is a recurrent problem. The current episode started 1 to 4 weeks ago (Tuesday, last week). The problem has been gradually worsening. There has been no fever. Associated symptoms include coughing, headaches and sinus pain. Pertinent negatives include no congestion, ear pain, nausea, rash, sore throat, vomiting or wheezing. Treatments tried: Z-Pack, Mucinex. The treatment provided no relief.       Constitution: Positive for fatigue. Negative for chills, sweating and fever.   HENT: Positive for postnasal drip, sinus pain and sinus pressure. Negative for ear pain, congestion, sore throat and voice change.    Neck: Negative for painful lymph nodes.   Eyes: Negative for eye redness.   Respiratory: Positive for cough. Negative for chest tightness, sputum production, bloody sputum, COPD, shortness of breath, stridor, wheezing and asthma.    Gastrointestinal: Negative for nausea and vomiting.   Musculoskeletal: Negative for muscle ache.   Skin: Negative for rash.   Allergic/Immunologic: Negative for seasonal allergies and asthma.   Neurological: Positive for headaches.   Hematologic/Lymphatic: Negative for swollen lymph nodes.       Objective:      Physical Exam "   Constitutional: She appears well-developed and well-nourished.   HENT:   Head: Normocephalic and atraumatic.   Nose: Mucosal edema and rhinorrhea present. Right sinus exhibits no maxillary sinus tenderness and no frontal sinus tenderness. Left sinus exhibits no maxillary sinus tenderness and no frontal sinus tenderness.   Mouth/Throat: Posterior oropharyngeal erythema present. No oropharyngeal exudate.   Eyes: Pupils are equal, round, and reactive to light. EOM are normal.   Neck: Normal range of motion. Neck supple.   Cardiovascular: Normal rate, regular rhythm and normal heart sounds.   Pulmonary/Chest: Effort normal and breath sounds normal.   Abdominal: Soft.   Nursing note and vitals reviewed.      Results for orders placed or performed in visit on 04/09/19   POCT Influenza A/B   Result Value Ref Range    Rapid Influenza A Ag Negative Negative    Rapid Influenza B Ag Negative Negative     Acceptable Yes      Assessment:       1. Upper respiratory tract infection, unspecified type        Plan:         Upper respiratory tract infection, unspecified type  -     POCT Influenza A/B    continue with current management. Complete azithromycin as previously prescribed.

## 2019-05-10 ENCOUNTER — HOSPITAL ENCOUNTER (OUTPATIENT)
Dept: RADIOLOGY | Facility: HOSPITAL | Age: 59
Discharge: HOME | End: 2019-05-10
Attending: OBSTETRICS & GYNECOLOGY
Payer: COMMERCIAL

## 2019-05-10 DIAGNOSIS — Z12.31 ENCOUNTER FOR SCREENING MAMMOGRAM FOR MALIGNANT NEOPLASM OF BREAST: ICD-10-CM

## 2019-05-10 PROCEDURE — 77067 SCR MAMMO BI INCL CAD: CPT | Mod: 26,,, | Performed by: RADIOLOGY

## 2019-05-10 PROCEDURE — 77063 BREAST TOMOSYNTHESIS BI: CPT | Mod: 26,,, | Performed by: RADIOLOGY

## 2019-05-10 PROCEDURE — 77067 SCR MAMMO BI INCL CAD: CPT | Mod: TC

## 2019-05-10 PROCEDURE — 77063 MAMMO DIGITAL SCREENING BILAT WITH TOMOSYNTHESIS_CAD: ICD-10-PCS | Mod: 26,,, | Performed by: RADIOLOGY

## 2019-05-10 PROCEDURE — 77067 MAMMO DIGITAL SCREENING BILAT WITH TOMOSYNTHESIS_CAD: ICD-10-PCS | Mod: 26,,, | Performed by: RADIOLOGY

## 2019-05-13 ENCOUNTER — TELEPHONE (OUTPATIENT)
Dept: RADIOLOGY | Facility: HOSPITAL | Age: 59
End: 2019-05-13

## 2019-05-13 ENCOUNTER — HOSPITAL ENCOUNTER (OUTPATIENT)
Dept: RADIOLOGY | Facility: HOSPITAL | Age: 59
Discharge: HOME | End: 2019-05-13
Attending: OBSTETRICS & GYNECOLOGY
Payer: COMMERCIAL

## 2019-05-13 DIAGNOSIS — R92.8 ABNORMAL MAMMOGRAM: ICD-10-CM

## 2019-05-13 PROCEDURE — 77065 DX MAMMO INCL CAD UNI: CPT | Mod: TC,PO,LT

## 2019-05-13 PROCEDURE — 77061 MAMMO DIGITAL DIAGNOSTIC LEFT WITH TOMOSYNTHESIS_CAD: ICD-10-PCS | Mod: 26,LT,, | Performed by: RADIOLOGY

## 2019-05-13 PROCEDURE — 77061 BREAST TOMOSYNTHESIS UNI: CPT | Mod: TC,PO,LT

## 2019-05-13 PROCEDURE — 77065 DX MAMMO INCL CAD UNI: CPT | Mod: 26,LT,, | Performed by: RADIOLOGY

## 2019-05-13 PROCEDURE — 77061 BREAST TOMOSYNTHESIS UNI: CPT | Mod: 26,LT,, | Performed by: RADIOLOGY

## 2019-05-13 PROCEDURE — 77065 MAMMO DIGITAL DIAGNOSTIC LEFT WITH TOMOSYNTHESIS_CAD: ICD-10-PCS | Mod: 26,LT,, | Performed by: RADIOLOGY

## 2019-05-13 NOTE — TELEPHONE ENCOUNTER
Spoke with patient and explained mammogram findings.Patient expressed understanding of results. Patient scheduled abnormal mammogram follow up appointment at [facility] on 5/15/2019.

## 2019-05-14 ENCOUNTER — PATIENT MESSAGE (OUTPATIENT)
Dept: INTERNAL MEDICINE | Facility: CLINIC | Age: 59
End: 2019-05-14

## 2019-05-14 ENCOUNTER — OFFICE VISIT (OUTPATIENT)
Dept: INTERNAL MEDICINE | Facility: CLINIC | Age: 59
End: 2019-05-14
Payer: COMMERCIAL

## 2019-05-14 ENCOUNTER — LAB VISIT (OUTPATIENT)
Dept: LAB | Facility: HOSPITAL | Age: 59
End: 2019-05-14
Attending: INTERNAL MEDICINE
Payer: COMMERCIAL

## 2019-05-14 VITALS
DIASTOLIC BLOOD PRESSURE: 70 MMHG | BODY MASS INDEX: 31.18 KG/M2 | OXYGEN SATURATION: 98 % | HEIGHT: 66 IN | SYSTOLIC BLOOD PRESSURE: 124 MMHG | HEART RATE: 80 BPM | WEIGHT: 194 LBS

## 2019-05-14 DIAGNOSIS — G47.62 NOCTURNAL LEG CRAMPS: ICD-10-CM

## 2019-05-14 DIAGNOSIS — G47.62 NOCTURNAL LEG CRAMPS: Primary | ICD-10-CM

## 2019-05-14 DIAGNOSIS — E03.9 HYPOTHYROIDISM, UNSPECIFIED TYPE: ICD-10-CM

## 2019-05-14 DIAGNOSIS — E03.9 HYPOTHYROIDISM, UNSPECIFIED TYPE: Primary | ICD-10-CM

## 2019-05-14 LAB
ALBUMIN SERPL BCP-MCNC: 3.7 G/DL (ref 3.5–5.2)
ALP SERPL-CCNC: 83 U/L (ref 55–135)
ALT SERPL W/O P-5'-P-CCNC: 20 U/L (ref 10–44)
ANION GAP SERPL CALC-SCNC: 7 MMOL/L (ref 8–16)
AST SERPL-CCNC: 22 U/L (ref 10–40)
BASOPHILS # BLD AUTO: 0.02 K/UL (ref 0–0.2)
BASOPHILS NFR BLD: 0.3 % (ref 0–1.9)
BILIRUB SERPL-MCNC: 0.6 MG/DL (ref 0.1–1)
BUN SERPL-MCNC: 9 MG/DL (ref 6–20)
CALCIUM SERPL-MCNC: 9.8 MG/DL (ref 8.7–10.5)
CHLORIDE SERPL-SCNC: 104 MMOL/L (ref 95–110)
CO2 SERPL-SCNC: 29 MMOL/L (ref 23–29)
CREAT SERPL-MCNC: 0.7 MG/DL (ref 0.5–1.4)
DIFFERENTIAL METHOD: ABNORMAL
EOSINOPHIL # BLD AUTO: 0 K/UL (ref 0–0.5)
EOSINOPHIL NFR BLD: 0.1 % (ref 0–8)
ERYTHROCYTE [DISTWIDTH] IN BLOOD BY AUTOMATED COUNT: 13.2 % (ref 11.5–14.5)
EST. GFR  (AFRICAN AMERICAN): >60 ML/MIN/1.73 M^2
EST. GFR  (NON AFRICAN AMERICAN): >60 ML/MIN/1.73 M^2
GLUCOSE SERPL-MCNC: 75 MG/DL (ref 70–110)
HCT VFR BLD AUTO: 44 % (ref 37–48.5)
HGB BLD-MCNC: 14 G/DL (ref 12–16)
IMM GRANULOCYTES # BLD AUTO: 0.03 K/UL (ref 0–0.04)
IMM GRANULOCYTES NFR BLD AUTO: 0.4 % (ref 0–0.5)
LYMPHOCYTES # BLD AUTO: 1.6 K/UL (ref 1–4.8)
LYMPHOCYTES NFR BLD: 20.4 % (ref 18–48)
MAGNESIUM SERPL-MCNC: 2.3 MG/DL (ref 1.6–2.6)
MCH RBC QN AUTO: 28.9 PG (ref 27–31)
MCHC RBC AUTO-ENTMCNC: 31.8 G/DL (ref 32–36)
MCV RBC AUTO: 91 FL (ref 82–98)
MONOCYTES # BLD AUTO: 0.5 K/UL (ref 0.3–1)
MONOCYTES NFR BLD: 6.1 % (ref 4–15)
NEUTROPHILS # BLD AUTO: 5.6 K/UL (ref 1.8–7.7)
NEUTROPHILS NFR BLD: 72.7 % (ref 38–73)
NRBC BLD-RTO: 0 /100 WBC
PLATELET # BLD AUTO: 301 K/UL (ref 150–350)
PMV BLD AUTO: 10.2 FL (ref 9.2–12.9)
POTASSIUM SERPL-SCNC: 4.1 MMOL/L (ref 3.5–5.1)
PROT SERPL-MCNC: 7.6 G/DL (ref 6–8.4)
RBC # BLD AUTO: 4.85 M/UL (ref 4–5.4)
SODIUM SERPL-SCNC: 140 MMOL/L (ref 136–145)
T3FREE SERPL-MCNC: 3.2 PG/ML (ref 2.3–4.2)
T4 FREE SERPL-MCNC: 1.08 NG/DL (ref 0.71–1.51)
TSH SERPL DL<=0.005 MIU/L-ACNC: 2.41 UIU/ML (ref 0.4–4)
WBC # BLD AUTO: 7.68 K/UL (ref 3.9–12.7)

## 2019-05-14 PROCEDURE — 99999 PR PBB SHADOW E&M-EST. PATIENT-LVL III: ICD-10-PCS | Mod: PBBFAC,,, | Performed by: INTERNAL MEDICINE

## 2019-05-14 PROCEDURE — 99214 PR OFFICE/OUTPT VISIT, EST, LEVL IV, 30-39 MIN: ICD-10-PCS | Mod: S$GLB,,, | Performed by: INTERNAL MEDICINE

## 2019-05-14 PROCEDURE — 3008F PR BODY MASS INDEX (BMI) DOCUMENTED: ICD-10-PCS | Mod: CPTII,S$GLB,, | Performed by: INTERNAL MEDICINE

## 2019-05-14 PROCEDURE — 85025 COMPLETE CBC W/AUTO DIFF WBC: CPT

## 2019-05-14 PROCEDURE — 80053 COMPREHEN METABOLIC PANEL: CPT

## 2019-05-14 PROCEDURE — 99999 PR PBB SHADOW E&M-EST. PATIENT-LVL III: CPT | Mod: PBBFAC,,, | Performed by: INTERNAL MEDICINE

## 2019-05-14 PROCEDURE — 83735 ASSAY OF MAGNESIUM: CPT

## 2019-05-14 PROCEDURE — 99214 OFFICE O/P EST MOD 30 MIN: CPT | Mod: S$GLB,,, | Performed by: INTERNAL MEDICINE

## 2019-05-14 PROCEDURE — 84481 FREE ASSAY (FT-3): CPT

## 2019-05-14 PROCEDURE — 84439 ASSAY OF FREE THYROXINE: CPT

## 2019-05-14 PROCEDURE — 3008F BODY MASS INDEX DOCD: CPT | Mod: CPTII,S$GLB,, | Performed by: INTERNAL MEDICINE

## 2019-05-14 PROCEDURE — 84443 ASSAY THYROID STIM HORMONE: CPT

## 2019-05-14 PROCEDURE — 36415 COLL VENOUS BLD VENIPUNCTURE: CPT | Mod: PO

## 2019-05-14 NOTE — TELEPHONE ENCOUNTER
Call patient    The blood tests from the visit which included a complete chemistry, blood count ,thyroid panel, magnesium all normal    Make arrangements for the come back in for fasting lipid profile and vitamin-D

## 2019-05-14 NOTE — PROGRESS NOTES
REASON FOR VISIT:  This is a 58-year-old female.  Early this morning and last   night, she had an episode of leg cramps.  It was around 1:00 a.m. when she woke   up with a tight crampy feeling first involving her left upper thigh and then her   right and extended down the leg.  She got up, it was hard to stand up and walk   for a while, but she eventually was able to do it, but after 10 to 15 minutes it   passed.  This morning around 6:00 a.m., she started feeling a cramp, a charley   horse pain in the right calf that extended up to the leg.  She took some tonic   water and eventually it passed even before 10 minutes.  She admits that she was   under a lot of personal stress yesterday because she was called in to do a   repeat mammogram, which was fine.  She had a similar experience of leg cramps   when she was in [place] at [facility] in May 2018 and October 2018 where her    was there for metastatic prostate cancer (he eventually passed away).    She was told to take tonic water every night, which was helpful and had some lab   testing, which reportedly was normal.    PAST MEDICAL HISTORY:  Hypothyroid disease.  Focal nodular hyperplasia of the liver.    PHYSICAL EXAMINATION:  VITAL SIGNS:  Weight is 194 pounds, pulse 76, blood pressure 124/70.  ABDOMEN:  Active bowel sounds, soft, nontender.  PULSES:  2+ pedal pulses.  EXTREMITIES:  No edema.  Mild superficial varicose veins, 2+ knee and 1+ ankle   jerk reflex.  She is not tender over the calf.  No asymmetric swelling or edema.    IMPRESSION:  1. Nocturnal leg cramps.  2. Hypothyroid.    PLAN:  Today, we will get a chemistry, TSH, CBC, also magnesium level.    Stretching exercise of legs discussed.  Consider taking magnesium   supplementation at night and actually she can even try pickle juice.  Let me   know if there is no improvement.        JAM/HN  dd: 05/14/2019 11:12:52 (CDT)  td: 05/15/2019 00:16:42 (CDT)  Doc ID   #[id number]  Job ID #[id number]    CC:

## 2019-05-16 ENCOUNTER — LAB VISIT (OUTPATIENT)
Dept: LAB | Facility: HOSPITAL | Age: 59
End: 2019-05-16
Attending: INTERNAL MEDICINE
Payer: COMMERCIAL

## 2019-05-16 DIAGNOSIS — E03.9 HYPOTHYROIDISM, UNSPECIFIED TYPE: ICD-10-CM

## 2019-05-16 LAB
25(OH)D3+25(OH)D2 SERPL-MCNC: 33 NG/ML (ref 30–96)
CHOLEST SERPL-MCNC: 161 MG/DL (ref 120–199)
CHOLEST/HDLC SERPL: 3 {RATIO} (ref 2–5)
HDLC SERPL-MCNC: 53 MG/DL (ref 40–75)
HDLC SERPL: 32.9 % (ref 20–50)
LDLC SERPL CALC-MCNC: 96 MG/DL (ref 63–159)
NONHDLC SERPL-MCNC: 108 MG/DL
TRIGL SERPL-MCNC: 60 MG/DL (ref 30–150)

## 2019-05-16 PROCEDURE — 80061 LIPID PANEL: CPT

## 2019-05-16 PROCEDURE — 36415 COLL VENOUS BLD VENIPUNCTURE: CPT | Mod: PO

## 2019-05-16 PROCEDURE — 82306 VITAMIN D 25 HYDROXY: CPT

## 2019-06-10 RX ORDER — LEVOTHYROXINE SODIUM 88 UG/1
88 TABLET ORAL
Qty: 30 TABLET | Refills: 10 | Status: SHIPPED | OUTPATIENT
Start: 2019-06-10 | End: 2020-03-17

## 2019-06-24 ENCOUNTER — OFFICE VISIT (OUTPATIENT)
Dept: INTERNAL MEDICINE | Facility: CLINIC | Age: 59
End: 2019-06-24
Payer: COMMERCIAL

## 2019-06-24 VITALS
WEIGHT: 198.44 LBS | SYSTOLIC BLOOD PRESSURE: 130 MMHG | BODY MASS INDEX: 32.02 KG/M2 | HEART RATE: 80 BPM | DIASTOLIC BLOOD PRESSURE: 80 MMHG

## 2019-06-24 DIAGNOSIS — J02.9 PHARYNGITIS, UNSPECIFIED ETIOLOGY: ICD-10-CM

## 2019-06-24 DIAGNOSIS — J06.9 UPPER RESPIRATORY TRACT INFECTION, UNSPECIFIED TYPE: Primary | ICD-10-CM

## 2019-06-24 LAB
DEPRECATED S PYO AG THROAT QL EIA: NEGATIVE
INFLUENZA A, MOLECULAR: NEGATIVE
INFLUENZA B, MOLECULAR: NEGATIVE
SPECIMEN SOURCE: NORMAL

## 2019-06-24 PROCEDURE — 99214 OFFICE O/P EST MOD 30 MIN: CPT | Mod: S$GLB,,, | Performed by: INTERNAL MEDICINE

## 2019-06-24 PROCEDURE — 87147 CULTURE TYPE IMMUNOLOGIC: CPT | Mod: 59

## 2019-06-24 PROCEDURE — 87502 INFLUENZA DNA AMP PROBE: CPT

## 2019-06-24 PROCEDURE — 3008F BODY MASS INDEX DOCD: CPT | Mod: CPTII,S$GLB,, | Performed by: INTERNAL MEDICINE

## 2019-06-24 PROCEDURE — 99999 PR PBB SHADOW E&M-EST. PATIENT-LVL III: ICD-10-PCS | Mod: PBBFAC,,, | Performed by: INTERNAL MEDICINE

## 2019-06-24 PROCEDURE — 3008F PR BODY MASS INDEX (BMI) DOCUMENTED: ICD-10-PCS | Mod: CPTII,S$GLB,, | Performed by: INTERNAL MEDICINE

## 2019-06-24 PROCEDURE — 99214 PR OFFICE/OUTPT VISIT, EST, LEVL IV, 30-39 MIN: ICD-10-PCS | Mod: S$GLB,,, | Performed by: INTERNAL MEDICINE

## 2019-06-24 PROCEDURE — 87081 CULTURE SCREEN ONLY: CPT

## 2019-06-24 PROCEDURE — 99999 PR PBB SHADOW E&M-EST. PATIENT-LVL III: CPT | Mod: PBBFAC,,, | Performed by: INTERNAL MEDICINE

## 2019-06-24 PROCEDURE — 87880 STREP A ASSAY W/OPTIC: CPT

## 2019-06-24 NOTE — PROGRESS NOTES
REASON FOR VISIT: This is a 58-year-old female.  After waking today, she has   been having a sore throat, ears feel uncomfortable, body aches, dry and   nonproductive cough, feeling congested in her head.  She is not blowing out any   mucus.  There is no fever.  Yesterday, she can tell that she was not feeling   right and that a cold may be coming on.    PAST MEDICAL HISTORY:  Hypothyroid, focal nodular hyperplasia of the liver, has seen previously for   sinusitis and upper respiratory infections.    PHYSICAL EXAMINATION:  VITAL SIGNS:  Weight is 198 pounds, pulse 80, blood pressure 128/72, temperature   is 98.2.  HEENT:  Tympanic membranes normal.  Nasal mucosa, some clear congestion.    Oropharynx is hyperemic.  NECK:  Tender left submandibular lymph gland.  LUNGS:  Clear.  HEART:  Regular rate and rhythm.    IMPRESSION:  1. Upper respiratory infection.  2. Pharyngitis.    PLAN:  Rapid throat screen was taken as well as nasal swab for influenza.  Can   take Mucinex.  To use Flonase two puffs once a day and then phone review to   follow up.        JAM/HN  dd: 06/24/2019 11:48:02 (CDT)  td: 06/24/2019 20:46:37 (CDT)  Doc ID   #[id number]  Job ID #[id number]    CC:

## 2019-06-27 LAB — BACTERIA THROAT CULT: NORMAL

## 2019-06-30 ENCOUNTER — OFFICE VISIT (OUTPATIENT)
Dept: URGENT CARE | Facility: CLINIC | Age: 59
End: 2019-06-30
Payer: COMMERCIAL

## 2019-06-30 VITALS
HEIGHT: 66 IN | OXYGEN SATURATION: 97 % | WEIGHT: 198 LBS | BODY MASS INDEX: 31.82 KG/M2 | HEART RATE: 86 BPM | RESPIRATION RATE: 20 BRPM | DIASTOLIC BLOOD PRESSURE: 77 MMHG | TEMPERATURE: 99 F | SYSTOLIC BLOOD PRESSURE: 136 MMHG

## 2019-06-30 DIAGNOSIS — J06.9 UPPER RESPIRATORY TRACT INFECTION, UNSPECIFIED TYPE: Primary | ICD-10-CM

## 2019-06-30 PROCEDURE — 99214 OFFICE O/P EST MOD 30 MIN: CPT | Mod: 25,S$GLB,, | Performed by: FAMILY MEDICINE

## 2019-06-30 PROCEDURE — 99214 PR OFFICE/OUTPT VISIT, EST, LEVL IV, 30-39 MIN: ICD-10-PCS | Mod: 25,S$GLB,, | Performed by: FAMILY MEDICINE

## 2019-06-30 PROCEDURE — 96372 PR INJECTION,THERAP/PROPH/DIAG2ST, IM OR SUBCUT: ICD-10-PCS | Mod: S$GLB,,, | Performed by: FAMILY MEDICINE

## 2019-06-30 PROCEDURE — 3008F PR BODY MASS INDEX (BMI) DOCUMENTED: ICD-10-PCS | Mod: CPTII,S$GLB,, | Performed by: FAMILY MEDICINE

## 2019-06-30 PROCEDURE — 3008F BODY MASS INDEX DOCD: CPT | Mod: CPTII,S$GLB,, | Performed by: FAMILY MEDICINE

## 2019-06-30 PROCEDURE — 96372 THER/PROPH/DIAG INJ SC/IM: CPT | Mod: S$GLB,,, | Performed by: FAMILY MEDICINE

## 2019-06-30 RX ORDER — BETAMETHASONE SODIUM PHOSPHATE AND BETAMETHASONE ACETATE 3; 3 MG/ML; MG/ML
6 INJECTION, SUSPENSION INTRA-ARTICULAR; INTRALESIONAL; INTRAMUSCULAR; SOFT TISSUE
Status: COMPLETED | OUTPATIENT
Start: 2019-06-30 | End: 2019-06-30

## 2019-06-30 RX ORDER — AZITHROMYCIN 250 MG/1
TABLET, FILM COATED ORAL
Qty: 6 TABLET | Refills: 0 | Status: SHIPPED | OUTPATIENT
Start: 2019-06-30 | End: 2019-07-05

## 2019-06-30 RX ADMIN — BETAMETHASONE SODIUM PHOSPHATE AND BETAMETHASONE ACETATE 6 MG: 3; 3 INJECTION, SUSPENSION INTRA-ARTICULAR; INTRALESIONAL; INTRAMUSCULAR; SOFT TISSUE at 05:06

## 2019-06-30 NOTE — PATIENT INSTRUCTIONS
Viral Upper Respiratory Illness (Adult)  You have a viral upper respiratory illness (URI), which is another term for the common cold. This illness is contagious during the first few days. It is spread through the air by coughing and sneezing. It may also be spread by direct contact (touching the sick person and then touching your own eyes, nose, or mouth). Frequent handwashing will decrease risk of spread. Most viral illnesses go away within 7 to 10 days with rest and simple home remedies. Sometimes the illness may last for several weeks. Antibiotics will not kill a virus, and they are generally not prescribed for this condition.    Home care  · If symptoms are severe, rest at home for the first 2 to 3 days. When you resume activity, don't let yourself get too tired.  · Avoid being exposed to cigarette smoke (yours or others).  · You may use acetaminophen or ibuprofen to control pain and fever, unless another medicine was prescribed. (Note: If you have chronic liver or kidney disease, have ever had a stomach ulcer or gastrointestinal bleeding, or are taking blood-thinning medicines, talk with your healthcare provider before using these medicines.) Aspirin should never be given to anyone under 18 years of age who is ill with a viral infection or fever. It may cause severe liver or brain damage.  · Your appetite may be poor, so a light diet is fine. Avoid dehydration by drinking 6 to 8 glasses of fluids per day (water, soft drinks, juices, tea, or soup). Extra fluids will help loosen secretions in the nose and lungs.  · Over-the-counter cold medicines will not shorten the length of time youre sick, but they may be helpful for the following symptoms: cough, sore throat, and nasal and sinus congestion. (Note: Do not use decongestants if you have high blood pressure.)  Follow-up care  Follow up with your healthcare provider, or as advised.  When to seek medical advice  Call your healthcare provider right away if any  of these occur:  · Cough with lots of colored sputum (mucus)  · Severe headache; face, neck, or ear pain  · Difficulty swallowing due to throat pain  · Fever of 100.4°F (38°C)  Call 911, or get immediate medical care  Call emergency services right away if any of these occur:  · Chest pain, shortness of breath, wheezing, or difficulty breathing  · Coughing up blood  · Inability to swallow due to throat pain  Date Last Reviewed: 9/13/2015  © 2020-7837 [employer]. [address] [place] [address]. All rights reserved. This information is not intended as a substitute for professional medical care. Always follow your healthcare professional's instructions.

## 2019-06-30 NOTE — PROGRESS NOTES
"Subjective:       Patient ID: Abiola Borjas is a 58 y.o. female.    Vitals:  height is 5' 6" (1.676 m) and weight is 89.8 kg (198 lb). Her oral temperature is 98.7 °F (37.1 °C). Her blood pressure is 136/77 and her pulse is 86. Her respiration is 20 and oxygen saturation is 97%.     Chief Complaint: Sinus Problem    This is a 58 y.o. female who presents today with a chief complaint of sinus problems for 1 week.  Patient has sinus pain/pressure, slight cough, nasal congestion, and post nasal drip.     Sinus Problem   This is a new problem. The current episode started 1 to 4 weeks ago. The problem has been gradually worsening since onset. There has been no fever. Her pain is at a severity of 0/10. She is experiencing no pain. Associated symptoms include congestion and sinus pressure. Pertinent negatives include no chills, coughing, diaphoresis, ear pain, shortness of breath or sore throat. Treatments tried: Mucinex.       Constitution: Negative for chills, sweating, fatigue and fever.   HENT: Positive for congestion, postnasal drip, sinus pain and sinus pressure. Negative for ear pain, sore throat and voice change.    Neck: Negative for painful lymph nodes.   Eyes: Negative for eye redness.   Respiratory: Negative for chest tightness, cough, sputum production, bloody sputum, COPD, shortness of breath, stridor, wheezing and asthma.    Gastrointestinal: Negative for nausea and vomiting.   Musculoskeletal: Negative for muscle ache.   Skin: Negative for rash.   Allergic/Immunologic: Negative for seasonal allergies and asthma.   Hematologic/Lymphatic: Negative for swollen lymph nodes.       Objective:      Physical Exam   Constitutional: She appears well-developed and well-nourished.   HENT:   Head: Normocephalic and atraumatic.   Nose: Mucosal edema and rhinorrhea present. Right sinus exhibits maxillary sinus tenderness. Right sinus exhibits no frontal sinus tenderness. Left sinus exhibits maxillary sinus tenderness. " Left sinus exhibits no frontal sinus tenderness.   Mouth/Throat: Posterior oropharyngeal erythema present.   Eyes: Pupils are equal, round, and reactive to light. EOM are normal.   Neck: Normal range of motion. Neck supple.   Cardiovascular: Normal rate and regular rhythm.   Pulmonary/Chest: Effort normal and breath sounds normal.   Nursing note and vitals reviewed.      Assessment:       1. Upper respiratory tract infection, unspecified type        Plan:         Upper respiratory tract infection, unspecified type  -     betamethasone acetate-betamethasone sodium phosphate injection 6 mg  -     azithromycin (ZITHROMAX) 250 MG tablet; Take 2 tablets (500 mg) on  Day 1,  followed by 1 tablet (250 mg) once daily on Days 2 through 5.  Dispense: 6 tablet; Refill: 0    antibiotics to hold. RTC prn worsneing symptoms

## 2019-07-16 ENCOUNTER — OFFICE VISIT (OUTPATIENT)
Dept: INTERNAL MEDICINE | Facility: CLINIC | Age: 59
End: 2019-07-16
Payer: COMMERCIAL

## 2019-07-16 ENCOUNTER — LAB VISIT (OUTPATIENT)
Dept: LAB | Facility: HOSPITAL | Age: 59
End: 2019-07-16
Attending: INTERNAL MEDICINE
Payer: COMMERCIAL

## 2019-07-16 VITALS
DIASTOLIC BLOOD PRESSURE: 88 MMHG | HEART RATE: 83 BPM | WEIGHT: 196 LBS | SYSTOLIC BLOOD PRESSURE: 136 MMHG | OXYGEN SATURATION: 97 % | BODY MASS INDEX: 31.5 KG/M2 | HEIGHT: 66 IN

## 2019-07-16 DIAGNOSIS — J02.9 PHARYNGITIS, UNSPECIFIED ETIOLOGY: Primary | ICD-10-CM

## 2019-07-16 DIAGNOSIS — J02.9 PHARYNGITIS, UNSPECIFIED ETIOLOGY: ICD-10-CM

## 2019-07-16 LAB
BASOPHILS # BLD AUTO: 0.01 K/UL (ref 0–0.2)
BASOPHILS NFR BLD: 0.1 % (ref 0–1.9)
DEPRECATED S PYO AG THROAT QL EIA: NEGATIVE
DIFFERENTIAL METHOD: ABNORMAL
EOSINOPHIL # BLD AUTO: 0 K/UL (ref 0–0.5)
EOSINOPHIL NFR BLD: 0.3 % (ref 0–8)
ERYTHROCYTE [DISTWIDTH] IN BLOOD BY AUTOMATED COUNT: 13.1 % (ref 11.5–14.5)
HCT VFR BLD AUTO: 40.9 % (ref 37–48.5)
HGB BLD-MCNC: 13.1 G/DL (ref 12–16)
IGA SERPL-MCNC: 217 MG/DL (ref 40–350)
IGG SERPL-MCNC: 1001 MG/DL (ref 650–1600)
IGM SERPL-MCNC: 196 MG/DL (ref 50–300)
IMM GRANULOCYTES # BLD AUTO: 0.02 K/UL (ref 0–0.04)
IMM GRANULOCYTES NFR BLD AUTO: 0.2 % (ref 0–0.5)
LYMPHOCYTES # BLD AUTO: 1.6 K/UL (ref 1–4.8)
LYMPHOCYTES NFR BLD: 17.4 % (ref 18–48)
MCH RBC QN AUTO: 29.4 PG (ref 27–31)
MCHC RBC AUTO-ENTMCNC: 32 G/DL (ref 32–36)
MCV RBC AUTO: 92 FL (ref 82–98)
MONOCYTES # BLD AUTO: 0.6 K/UL (ref 0.3–1)
MONOCYTES NFR BLD: 6.3 % (ref 4–15)
NEUTROPHILS # BLD AUTO: 6.8 K/UL (ref 1.8–7.7)
NEUTROPHILS NFR BLD: 75.7 % (ref 38–73)
NRBC BLD-RTO: 0 /100 WBC
PLATELET # BLD AUTO: 233 K/UL (ref 150–350)
PMV BLD AUTO: 9.9 FL (ref 9.2–12.9)
RBC # BLD AUTO: 4.46 M/UL (ref 4–5.4)
WBC # BLD AUTO: 9 K/UL (ref 3.9–12.7)

## 2019-07-16 PROCEDURE — 99999 PR PBB SHADOW E&M-EST. PATIENT-LVL IV: CPT | Mod: PBBFAC,,, | Performed by: INTERNAL MEDICINE

## 2019-07-16 PROCEDURE — 87880 STREP A ASSAY W/OPTIC: CPT

## 2019-07-16 PROCEDURE — 86317 IMMUNOASSAY INFECTIOUS AGENT: CPT | Mod: 59

## 2019-07-16 PROCEDURE — 82784 ASSAY IGA/IGD/IGG/IGM EACH: CPT | Mod: 59

## 2019-07-16 PROCEDURE — 99999 PR PBB SHADOW E&M-EST. PATIENT-LVL IV: ICD-10-PCS | Mod: PBBFAC,,, | Performed by: INTERNAL MEDICINE

## 2019-07-16 PROCEDURE — 99213 OFFICE O/P EST LOW 20 MIN: CPT | Mod: S$GLB,,, | Performed by: INTERNAL MEDICINE

## 2019-07-16 PROCEDURE — 85025 COMPLETE CBC W/AUTO DIFF WBC: CPT

## 2019-07-16 PROCEDURE — 87147 CULTURE TYPE IMMUNOLOGIC: CPT

## 2019-07-16 PROCEDURE — 36415 COLL VENOUS BLD VENIPUNCTURE: CPT | Mod: PO

## 2019-07-16 PROCEDURE — 3008F PR BODY MASS INDEX (BMI) DOCUMENTED: ICD-10-PCS | Mod: CPTII,S$GLB,, | Performed by: INTERNAL MEDICINE

## 2019-07-16 PROCEDURE — 99213 PR OFFICE/OUTPT VISIT, EST, LEVL III, 20-29 MIN: ICD-10-PCS | Mod: S$GLB,,, | Performed by: INTERNAL MEDICINE

## 2019-07-16 PROCEDURE — 87081 CULTURE SCREEN ONLY: CPT

## 2019-07-16 PROCEDURE — 3008F BODY MASS INDEX DOCD: CPT | Mod: CPTII,S$GLB,, | Performed by: INTERNAL MEDICINE

## 2019-07-16 RX ORDER — AZITHROMYCIN 250 MG/1
TABLET, FILM COATED ORAL
Qty: 6 TABLET | Refills: 0 | Status: SHIPPED | OUTPATIENT
Start: 2019-07-16 | End: 2019-08-21

## 2019-07-16 NOTE — PROGRESS NOTES
REASON FOR VISIT:  This is a 58-year-old female.  She has been experiencing a   sore throat for the past few days and spitting up thick brown mucus from the   throat and just feeling bad.  She was seen by me on June 24th for pharyngitis,   which was felt to be viral and later presented to Urgent Care on 6-30 where she   got a steroid shot.  She felt a little bit better afterwards.  She went to   [place] about five days ago and got back yesterday.  Sore throat started   noticing two days ago.  She is always stuffy in the nose.  She does not have any   soreness or wheezing in her chest.    PHYSICAL EXAMINATION:  VITAL SIGNS:  Per EPIC.  HEENT:  Right tympanic membrane is slightly hyperemic.  Nasal mucosa is clear.    Oropharynx is erythematous.  She has tender submandibular lymph glands.  LUNGS:  Clear.  HEART:  Regular rate and rhythm.    IMPRESSION:  Recurrent pharyngitis.    PLAN:  Zithromax Z-SOFIA was sent in and today while she is here, a rapid throat   screen, a CBC, immunoglobulins and Streptococcus pneumoniae IgG serotypes.        KENNETH/RACH  dd: 07/16/2019 12:33:29 (CDT)  td: 07/16/2019 13:13:43 (CDT)  Doc ID   #[id number]  Job ID #[id number]    CC:

## 2019-07-19 LAB — BACTERIA THROAT CULT: NORMAL

## 2019-07-22 LAB
DEPRECATED S PNEUM 1 IGG SER-MCNC: 1.1 MCG/ML
DEPRECATED S PNEUM12 IGG SER-MCNC: 4 MCG/ML
DEPRECATED S PNEUM14 IGG SER-MCNC: 1.1 MCG/ML
DEPRECATED S PNEUM19 IGG SER-MCNC: 19.4 MCG/ML
DEPRECATED S PNEUM23 IGG SER-MCNC: 0.3 MCG/ML
DEPRECATED S PNEUM3 IGG SER-MCNC: 0.4 MCG/ML
DEPRECATED S PNEUM4 IGG SER-MCNC: <0.3 MCG/ML
DEPRECATED S PNEUM5 IGG SER-MCNC: <0.3 MCG/ML
DEPRECATED S PNEUM8 IGG SER-MCNC: <0.3 MCG/ML
DEPRECATED S PNEUM9 IGG SER-MCNC: 0.4 MCG/ML
S PNEUM DA 18C IGG SER-MCNC: 2.7 MCG/ML
S PNEUM DA 6B IGG SER-MCNC: 0.4 MCG/ML
S PNEUM DA 7F IGG SER-MCNC: 0.7 MCG/ML
S PNEUM DA 9V IGG SER-MCNC: <0.3 MCG/ML

## 2019-07-24 ENCOUNTER — PATIENT MESSAGE (OUTPATIENT)
Dept: INTERNAL MEDICINE | Facility: CLINIC | Age: 59
End: 2019-07-24

## 2019-08-20 ENCOUNTER — TELEPHONE (OUTPATIENT)
Dept: OBSTETRICS AND GYNECOLOGY | Facility: CLINIC | Age: 59
End: 2019-08-20

## 2019-08-20 NOTE — TELEPHONE ENCOUNTER
Dr Tejada pt calling, pt has been having vaginal itching and swollen wants to know if she can get medication or does she need to be seen.Pt # [phone]

## 2019-08-20 NOTE — TELEPHONE ENCOUNTER
"Pt reports intermittent swelling and itching for several weeks.  Denies discharge.  She wears a pad daily due to "leakage".  Scheduled tomorrow with Dr. Tejada.   "

## 2019-08-21 ENCOUNTER — TELEPHONE (OUTPATIENT)
Dept: INTERNAL MEDICINE | Facility: CLINIC | Age: 59
End: 2019-08-21

## 2019-08-21 ENCOUNTER — OFFICE VISIT (OUTPATIENT)
Dept: OBSTETRICS AND GYNECOLOGY | Facility: CLINIC | Age: 59
End: 2019-08-21
Payer: COMMERCIAL

## 2019-08-21 VITALS
WEIGHT: 200.63 LBS | SYSTOLIC BLOOD PRESSURE: 130 MMHG | HEIGHT: 66 IN | DIASTOLIC BLOOD PRESSURE: 80 MMHG | BODY MASS INDEX: 32.24 KG/M2

## 2019-08-21 DIAGNOSIS — B37.31 CANDIDIASIS OF VULVA AND VAGINA: Primary | ICD-10-CM

## 2019-08-21 DIAGNOSIS — N89.8 VAGINAL ITCHING: ICD-10-CM

## 2019-08-21 LAB
BACTERIA HYPHAE, POC: NEGATIVE
GARDNERELLA VAGINALIS: NEGATIVE
OTHER MICROSC. OBSERVATIONS: ABNORMAL
POC BACTERIAL VAGINOSIS: NEGATIVE
POC CLUE CELLS: NEGATIVE
TRICHOMONAS, POC: NEGATIVE
YEAST WET PREP: POSITIVE
YEAST, POC: POSITIVE

## 2019-08-21 PROCEDURE — 87220 TISSUE EXAM FOR FUNGI: CPT | Mod: S$GLB,,, | Performed by: OBSTETRICS & GYNECOLOGY

## 2019-08-21 PROCEDURE — 99214 OFFICE O/P EST MOD 30 MIN: CPT | Mod: S$GLB,,, | Performed by: OBSTETRICS & GYNECOLOGY

## 2019-08-21 PROCEDURE — 3008F BODY MASS INDEX DOCD: CPT | Mod: CPTII,S$GLB,, | Performed by: OBSTETRICS & GYNECOLOGY

## 2019-08-21 PROCEDURE — 87210 POCT WET PREP: ICD-10-PCS | Mod: QW,S$GLB,, | Performed by: OBSTETRICS & GYNECOLOGY

## 2019-08-21 PROCEDURE — 87220 POCT KOH: ICD-10-PCS | Mod: S$GLB,,, | Performed by: OBSTETRICS & GYNECOLOGY

## 2019-08-21 PROCEDURE — 87210 SMEAR WET MOUNT SALINE/INK: CPT | Mod: QW,S$GLB,, | Performed by: OBSTETRICS & GYNECOLOGY

## 2019-08-21 PROCEDURE — 3008F PR BODY MASS INDEX (BMI) DOCUMENTED: ICD-10-PCS | Mod: CPTII,S$GLB,, | Performed by: OBSTETRICS & GYNECOLOGY

## 2019-08-21 PROCEDURE — 99999 PR PBB SHADOW E&M-EST. PATIENT-LVL III: ICD-10-PCS | Mod: PBBFAC,,, | Performed by: OBSTETRICS & GYNECOLOGY

## 2019-08-21 PROCEDURE — 99999 PR PBB SHADOW E&M-EST. PATIENT-LVL III: CPT | Mod: PBBFAC,,, | Performed by: OBSTETRICS & GYNECOLOGY

## 2019-08-21 PROCEDURE — 99214 PR OFFICE/OUTPT VISIT, EST, LEVL IV, 30-39 MIN: ICD-10-PCS | Mod: S$GLB,,, | Performed by: OBSTETRICS & GYNECOLOGY

## 2019-08-21 RX ORDER — FLUCONAZOLE 150 MG/1
TABLET ORAL
Qty: 2 TABLET | Refills: 1 | Status: SHIPPED | OUTPATIENT
Start: 2019-08-21 | End: 2020-03-05

## 2019-08-21 NOTE — PROGRESS NOTES
Abiola Borjas is a 58 y.o. female  presents with complaint of vvulvoaginal itching off and on for 1 month.  She wears a pad for urine leakage daily.  She was recently on ABX for URI the end of July.  She denies odor.  She states she denies a discharge.  No LMP recorded (lmp unknown). Patient is postmenopausal.  She had a sigmoidectomy and has to use bathroom multiple times daily.  She is not sexually active.    Past Medical History:   Diagnosis Date    Anxiety     Deviated septum     Diverticulosis of sigmoid colon     Focal nodular hyperplasia of liver     Hypoglycemia     Hypothyroidism     Menopause 2006    PONV (postoperative nausea and vomiting)     Snores     Thyroid disease      Past Surgical History:   Procedure Laterality Date    ABDOMINAL HERNIA REPAIR      CATHETERIZATION-URETERAL Left 10/1/2014    [facility] OR 2ND FLR     SECTION      CHOLECYSTECTOMY      COLECTOMY  2015    MD Rousseau for stricture    COLON SURGERY      COLONOSCOPY N/A 9/15/2015    [facility] Pat[facility] at [facility] ENDO (2ND FLR)    COLONOSCOPY N/A 9/15/2015    [facility] (4TH FLR)    COLONOSCOPY N/A 2014    [facility] (2ND FLR)    COLONOSCOPY N/A 2014    [facility] (4TH FLR)    COLONOSCOPY with covered stent placement N/A 10/29/2014    Per[facility] at [facility] (2ND FLR)    HERNIA REPAIR      SIGMOIDECTOMY-LAPAROSCOPIC N/A 10/1/2014    Performed by [facility] at [facility] OR 2ND FLR     Social History     Tobacco Use    Smoking status: Never Smoker    Smokeless tobacco: Never Used   Substance Use Topics    Alcohol use: Yes     Alcohol/week: 0.0 oz     Comment: occasional    Drug use: No     Family History   Problem Relation Age of Onset    Diabetes Mother     Hypertension Mother     Cancer Father       "   kidney    Depression Sister     Breast cancer Paternal Aunt 70    Prostate cancer Other     Leukemia Other         Medicine induced from chemo    Colon cancer Neg Hx     Miscarriages / Stillbirths Neg Hx      OB History    Para Term  AB Living   3 3 3     3   SAB TAB Ectopic Multiple Live Births           3      # Outcome Date GA Lbr Mark/2nd Weight Sex Delivery Anes PTL Lv   3 Term  39w0d   F CS-LTranv   PAMELA      Complications: Fetal Intolerance, Previous  section   2 Term  39w0d   F CS-LTranv   PAMELA      Complications: Breech presentation   1 Term  42w0d   F CS-LTranv   PAMELA      Complications: Failure to Progress in Second Stage, Decreased heart rate         ROS:  GENERAL: No fever, chills, fatigability or weight loss.  VULVAR: No pain, no lesions and no itching.  VAGINAL: No relaxation, no itching, no abnormal bleeding and no lesions.  ABDOMEN: No abdominal pain. Denies nausea. Denies vomiting. No diarrhea. No constipation  BREAST: Denies pain. No lumps. No discharge.  URINARY: No incontinence, no nocturia, no frequency and no dysuria.  CARDIOVASCULAR: No chest pain. No shortness of breath. No leg cramps.  NEUROLOGICAL: No headaches. No vision changes.    Vitals:    19 1116   BP: 130/80   Weight: 91 kg (200 lb 9.9 oz)   Height: 5' 6" (1.676 m)   PainSc: 0-No pain     Body mass index is 32.38 kg/m².    PHYSICAL EXAM:   External genitalia with moderate erythema  Urethra within normal limits.   Vagina without lesions.  Mild white discharge.    Cervix without lesions, discharge, or cervical motion tenderness.   Uterus normal in size and nontender. No adnexal masses or tenderness palpated.    KOH:  +hyphae  Wet prep:  Negative    ASSESSMENT and PLAN:  Candidiasis of vulva and vagina  -     fluconazole (DIFLUCAN) 150 MG Tab; 1 tablet orally every other day for 8 days  Dispense: 2 tablet; Refill: 1          Patient was counseled today on vaginitis prevention including " to:  Avoid feminine products such as deoderant soaps, body wash, bubble bath, douches, scented toilet paper, deoderant tampons or pads, feminine wipes, chronic pad use, etc.  Avoid other vulvovaginal irritants such as long hot baths, humidity, tight, synthetic clothing, chlorine and sitting around in wet bathing suits  Wear cotton underwear.  Shower immediately after exercise and change clothes  Use polyurethane condoms without spermicide if sexually active and symptoms are triggered by intercourse    FOLLOW UP: PRN lack of improvement.

## 2019-08-21 NOTE — TELEPHONE ENCOUNTER
----- Message from Alona Lee sent at 8/21/2019  3:07 PM CDT -----  Contact: Pt [phone]  She wants a callback to reschedule the pneumo 23 vaccine.    Thank you

## 2019-08-22 ENCOUNTER — TELEPHONE (OUTPATIENT)
Dept: OBSTETRICS AND GYNECOLOGY | Facility: CLINIC | Age: 59
End: 2019-08-22

## 2019-08-22 NOTE — TELEPHONE ENCOUNTER
Dr. Tejada pt called saying that she has some questions about some side effects from Fluconazole. Please advise.

## 2019-08-22 NOTE — TELEPHONE ENCOUNTER
Pt concerned with the side effects of Diflucan.  She was instructed not to drink alcohol while on it.  Advised if she takes it now she should be able to have a glass of wine in 8 days while on vacation.  Recommended she take 1 pill and if no improvement in 3 days to take another pill.  Informed her some people have GI upset on medication but other than that I have not heard of frequent complaints.  Dr. Tejada didn't want her to use Terazol.  Pt will take Diflucan

## 2019-08-27 RX ORDER — LIOTHYRONINE SODIUM 25 UG/1
TABLET ORAL
Qty: 30 TABLET | Refills: 0 | OUTPATIENT
Start: 2019-08-27

## 2019-08-27 RX ORDER — LIOTHYRONINE SODIUM 25 UG/1
TABLET ORAL
Qty: 30 TABLET | Refills: 0 | Status: CANCELLED | OUTPATIENT
Start: 2019-08-27

## 2019-08-27 RX ORDER — LIOTHYRONINE SODIUM 25 UG/1
TABLET ORAL
Qty: 45 TABLET | Refills: 3 | Status: SHIPPED | OUTPATIENT
Start: 2019-08-27 | End: 2020-11-03 | Stop reason: SDUPTHER

## 2019-08-29 ENCOUNTER — OFFICE VISIT (OUTPATIENT)
Dept: INTERNAL MEDICINE | Facility: CLINIC | Age: 59
End: 2019-08-29
Payer: COMMERCIAL

## 2019-08-29 ENCOUNTER — LAB VISIT (OUTPATIENT)
Dept: LAB | Facility: HOSPITAL | Age: 59
End: 2019-08-29
Attending: INTERNAL MEDICINE
Payer: COMMERCIAL

## 2019-08-29 VITALS
DIASTOLIC BLOOD PRESSURE: 82 MMHG | HEART RATE: 95 BPM | SYSTOLIC BLOOD PRESSURE: 118 MMHG | HEIGHT: 66 IN | OXYGEN SATURATION: 98 % | WEIGHT: 198 LBS | BODY MASS INDEX: 31.82 KG/M2

## 2019-08-29 DIAGNOSIS — K52.9 GASTROENTERITIS, ACUTE: Primary | ICD-10-CM

## 2019-08-29 DIAGNOSIS — K52.9 GASTROENTERITIS, ACUTE: ICD-10-CM

## 2019-08-29 LAB
ANION GAP SERPL CALC-SCNC: 8 MMOL/L (ref 8–16)
BASOPHILS # BLD AUTO: 0.01 K/UL (ref 0–0.2)
BASOPHILS NFR BLD: 0.1 % (ref 0–1.9)
BUN SERPL-MCNC: 9 MG/DL (ref 6–20)
CALCIUM SERPL-MCNC: 9.2 MG/DL (ref 8.7–10.5)
CHLORIDE SERPL-SCNC: 104 MMOL/L (ref 95–110)
CO2 SERPL-SCNC: 27 MMOL/L (ref 23–29)
CREAT SERPL-MCNC: 0.8 MG/DL (ref 0.5–1.4)
DIFFERENTIAL METHOD: ABNORMAL
EOSINOPHIL # BLD AUTO: 0 K/UL (ref 0–0.5)
EOSINOPHIL NFR BLD: 0 % (ref 0–8)
ERYTHROCYTE [DISTWIDTH] IN BLOOD BY AUTOMATED COUNT: 13.1 % (ref 11.5–14.5)
EST. GFR  (AFRICAN AMERICAN): >60 ML/MIN/1.73 M^2
EST. GFR  (NON AFRICAN AMERICAN): >60 ML/MIN/1.73 M^2
GLUCOSE SERPL-MCNC: 92 MG/DL (ref 70–110)
HCT VFR BLD AUTO: 44.5 % (ref 37–48.5)
HGB BLD-MCNC: 14.6 G/DL (ref 12–16)
IMM GRANULOCYTES # BLD AUTO: 0.02 K/UL (ref 0–0.04)
IMM GRANULOCYTES NFR BLD AUTO: 0.3 % (ref 0–0.5)
LYMPHOCYTES # BLD AUTO: 1 K/UL (ref 1–4.8)
LYMPHOCYTES NFR BLD: 13.3 % (ref 18–48)
MCH RBC QN AUTO: 29.7 PG (ref 27–31)
MCHC RBC AUTO-ENTMCNC: 32.8 G/DL (ref 32–36)
MCV RBC AUTO: 90 FL (ref 82–98)
MONOCYTES # BLD AUTO: 0.5 K/UL (ref 0.3–1)
MONOCYTES NFR BLD: 6.1 % (ref 4–15)
NEUTROPHILS # BLD AUTO: 6.1 K/UL (ref 1.8–7.7)
NEUTROPHILS NFR BLD: 80.2 % (ref 38–73)
NRBC BLD-RTO: 0 /100 WBC
PLATELET # BLD AUTO: 283 K/UL (ref 150–350)
PMV BLD AUTO: 10.1 FL (ref 9.2–12.9)
POTASSIUM SERPL-SCNC: 3.6 MMOL/L (ref 3.5–5.1)
RBC # BLD AUTO: 4.92 M/UL (ref 4–5.4)
SODIUM SERPL-SCNC: 139 MMOL/L (ref 136–145)
T3FREE SERPL-MCNC: 3.9 PG/ML (ref 2.3–4.2)
T4 FREE SERPL-MCNC: 1.03 NG/DL (ref 0.71–1.51)
TSH SERPL DL<=0.005 MIU/L-ACNC: 1.42 UIU/ML (ref 0.4–4)
WBC # BLD AUTO: 7.54 K/UL (ref 3.9–12.7)

## 2019-08-29 PROCEDURE — 3008F BODY MASS INDEX DOCD: CPT | Mod: CPTII,S$GLB,, | Performed by: INTERNAL MEDICINE

## 2019-08-29 PROCEDURE — 85025 COMPLETE CBC W/AUTO DIFF WBC: CPT

## 2019-08-29 PROCEDURE — 84439 ASSAY OF FREE THYROXINE: CPT

## 2019-08-29 PROCEDURE — 99214 PR OFFICE/OUTPT VISIT, EST, LEVL IV, 30-39 MIN: ICD-10-PCS | Mod: S$GLB,,, | Performed by: INTERNAL MEDICINE

## 2019-08-29 PROCEDURE — 99214 OFFICE O/P EST MOD 30 MIN: CPT | Mod: S$GLB,,, | Performed by: INTERNAL MEDICINE

## 2019-08-29 PROCEDURE — 99999 PR PBB SHADOW E&M-EST. PATIENT-LVL III: ICD-10-PCS | Mod: PBBFAC,,, | Performed by: INTERNAL MEDICINE

## 2019-08-29 PROCEDURE — 36415 COLL VENOUS BLD VENIPUNCTURE: CPT | Mod: PO

## 2019-08-29 PROCEDURE — 3008F PR BODY MASS INDEX (BMI) DOCUMENTED: ICD-10-PCS | Mod: CPTII,S$GLB,, | Performed by: INTERNAL MEDICINE

## 2019-08-29 PROCEDURE — 84481 FREE ASSAY (FT-3): CPT

## 2019-08-29 PROCEDURE — 84443 ASSAY THYROID STIM HORMONE: CPT

## 2019-08-29 PROCEDURE — 80048 BASIC METABOLIC PNL TOTAL CA: CPT

## 2019-08-29 PROCEDURE — 99999 PR PBB SHADOW E&M-EST. PATIENT-LVL III: CPT | Mod: PBBFAC,,, | Performed by: INTERNAL MEDICINE

## 2019-08-29 RX ORDER — PROMETHAZINE HYDROCHLORIDE 25 MG/1
25 TABLET ORAL EVERY 6 HOURS PRN
Qty: 5 TABLET | Refills: 0 | Status: SHIPPED | OUTPATIENT
Start: 2019-08-29 | End: 2019-11-25 | Stop reason: SDUPTHER

## 2019-08-29 NOTE — PROGRESS NOTES
REASON FOR VISIT:  This is a 58-year-old female.  Starting yesterday, she has   been having persistent watery diarrhea, one episode of vomiting.  She is   nauseated, feels abdominal discomfort.  There has been no fever or chills.  She   has a grandchild that recently had an intestinal infection.  She did inquire   about C. diff only thing in that she was placed on Zithromax Z-Rito back in June   and July, also recently received a course of fluticasone for vaginal yeast   infection.    PAST MEDICAL HISTORY:  Hypothyroid.  Focal nodular hyperplasia of the liver.  Anxiety.    CURRENT MEDICATIONS:  Cytomel 25 mcg.  Femhrt once a day.  Synthroid 0.088 mg a day.    PHYSICAL EXAMINATION:  VITAL SIGNS:  Weight is 198 pounds, pulse rate 96 to 100, blood pressure by me   138/70.  LUNGS:  Clear.  HEART:  Regular rate and rhythm.  ABDOMEN:  Active bowel sounds, soft, just uncomfortable throughout, but no   rebound or guarding.    IMPRESSION:  Probable viral gastroenteritis.    PLAN:  Today we will get a CBC and basic metabolic profile, recheck a thyroid   panel.  We will also get stools for C. diff and culture.  She can take   Pepto-Bismol regularly and Imodium A-D.        JAM/RACH  dd: 08/29/2019 10:44:28 (CDT)  td: 08/30/2019 04:37:31 (CDT)  Doc ID   #[id number]  Job ID #[id number]    CC:

## 2019-08-30 ENCOUNTER — PATIENT MESSAGE (OUTPATIENT)
Dept: INTERNAL MEDICINE | Facility: CLINIC | Age: 59
End: 2019-08-30

## 2019-09-19 ENCOUNTER — CLINICAL SUPPORT (OUTPATIENT)
Dept: INTERNAL MEDICINE | Facility: CLINIC | Age: 59
End: 2019-09-19
Payer: COMMERCIAL

## 2019-09-19 PROCEDURE — 90471 PNEUMOCOCCAL POLYSACCHARIDE VACCINE 23-VALENT =>2YO SQ IM: ICD-10-PCS | Mod: S$GLB,,, | Performed by: INTERNAL MEDICINE

## 2019-09-19 PROCEDURE — 90732 PPSV23 VACC 2 YRS+ SUBQ/IM: CPT | Mod: S$GLB,,, | Performed by: INTERNAL MEDICINE

## 2019-09-19 PROCEDURE — 90732 PNEUMOCOCCAL POLYSACCHARIDE VACCINE 23-VALENT =>2YO SQ IM: ICD-10-PCS | Mod: S$GLB,,, | Performed by: INTERNAL MEDICINE

## 2019-09-19 PROCEDURE — 90471 IMMUNIZATION ADMIN: CPT | Mod: S$GLB,,, | Performed by: INTERNAL MEDICINE

## 2019-09-19 NOTE — PROGRESS NOTES
Administered Pneumo-23 to the left deltoid, tolerated well, no c/o pain noted, no adverse reaction with in period.

## 2019-09-26 ENCOUNTER — OFFICE VISIT (OUTPATIENT)
Dept: INTERNAL MEDICINE | Facility: CLINIC | Age: 59
End: 2019-09-26
Payer: COMMERCIAL

## 2019-09-26 ENCOUNTER — TELEPHONE (OUTPATIENT)
Dept: INTERNAL MEDICINE | Facility: CLINIC | Age: 59
End: 2019-09-26

## 2019-09-26 VITALS
SYSTOLIC BLOOD PRESSURE: 128 MMHG | DIASTOLIC BLOOD PRESSURE: 78 MMHG | HEIGHT: 66 IN | OXYGEN SATURATION: 98 % | HEART RATE: 87 BPM | WEIGHT: 200 LBS | BODY MASS INDEX: 32.14 KG/M2

## 2019-09-26 DIAGNOSIS — L03.116 CELLULITIS OF LEFT LOWER EXTREMITY: Primary | ICD-10-CM

## 2019-09-26 PROCEDURE — 99213 OFFICE O/P EST LOW 20 MIN: CPT | Mod: S$GLB,,, | Performed by: INTERNAL MEDICINE

## 2019-09-26 PROCEDURE — 99999 PR PBB SHADOW E&M-EST. PATIENT-LVL III: ICD-10-PCS | Mod: PBBFAC,,, | Performed by: INTERNAL MEDICINE

## 2019-09-26 PROCEDURE — 3008F BODY MASS INDEX DOCD: CPT | Mod: CPTII,S$GLB,, | Performed by: INTERNAL MEDICINE

## 2019-09-26 PROCEDURE — 99999 PR PBB SHADOW E&M-EST. PATIENT-LVL III: CPT | Mod: PBBFAC,,, | Performed by: INTERNAL MEDICINE

## 2019-09-26 PROCEDURE — 99213 PR OFFICE/OUTPT VISIT, EST, LEVL III, 20-29 MIN: ICD-10-PCS | Mod: S$GLB,,, | Performed by: INTERNAL MEDICINE

## 2019-09-26 PROCEDURE — 3008F PR BODY MASS INDEX (BMI) DOCUMENTED: ICD-10-PCS | Mod: CPTII,S$GLB,, | Performed by: INTERNAL MEDICINE

## 2019-09-26 RX ORDER — MUPIROCIN 20 MG/G
OINTMENT TOPICAL 2 TIMES DAILY
Qty: 15 G | Refills: 0 | Status: SHIPPED | OUTPATIENT
Start: 2019-09-26 | End: 2020-03-05

## 2019-09-26 RX ORDER — SULFAMETHOXAZOLE AND TRIMETHOPRIM 800; 160 MG/1; MG/1
1 TABLET ORAL 2 TIMES DAILY
Qty: 14 TABLET | Refills: 0 | Status: SHIPPED | OUTPATIENT
Start: 2019-09-26 | End: 2019-10-03

## 2019-09-26 NOTE — PROGRESS NOTES
REASON FOR VISIT:  This is a 59-year-old female.  She is here because she has an   erythematous spot with a scab on her left lower leg.  She remembers getting a   small cut on her sometime last week.  She cannot remember the day when she hit   it against a metal piece.  There was a little bleeding.  She only applied   Neosporin and a Band-Aid.  She cannot remember it was yesterday or two days ago,   but she just started seeing some redness around the area.  There is actually no   pain.    PHYSICAL EXAMINATION:  VITAL SIGNS:  Per EPIC.  EXTREMITIES:  There is about a 1.5 cm erythematous, slightly indurated skin spot   with central scab.  No fluctuance.  Nontender, 2+ pedal pulses.    IMPRESSION:  Localized cellulitis.    PLAN:  Bactrim DS twice a day for seven days.  She is up-to-date on tetanus last   year.        KENNETH/IN  dd: 09/26/2019 12:34:42 (CDT)  td: 09/27/2019 03:51:08 (CDT)  Doc ID   #[id number]  Job ID #[id number]    CC:

## 2019-09-26 NOTE — TELEPHONE ENCOUNTER
----- Message from Adriana Orozco sent at 9/26/2019  1:11 PM CDT -----  Contact: Patient [phone]  Patient wants to know if she should put mupirocin (BACTROBAN) 2 % ointment on the area. And should she keep it covered or opened.    Please call and advice.    Thank You

## 2019-11-21 ENCOUNTER — OFFICE VISIT (OUTPATIENT)
Dept: INTERNAL MEDICINE | Facility: CLINIC | Age: 59
End: 2019-11-21
Payer: COMMERCIAL

## 2019-11-21 VITALS
WEIGHT: 198.44 LBS | HEART RATE: 91 BPM | BODY MASS INDEX: 32.02 KG/M2 | SYSTOLIC BLOOD PRESSURE: 128 MMHG | OXYGEN SATURATION: 97 % | TEMPERATURE: 98 F | DIASTOLIC BLOOD PRESSURE: 74 MMHG

## 2019-11-21 DIAGNOSIS — J02.9 ACUTE PHARYNGITIS, UNSPECIFIED ETIOLOGY: Primary | ICD-10-CM

## 2019-11-21 DIAGNOSIS — J31.0 CHRONIC RHINITIS: ICD-10-CM

## 2019-11-21 LAB — DEPRECATED S PYO AG THROAT QL EIA: NEGATIVE

## 2019-11-21 PROCEDURE — 96372 THER/PROPH/DIAG INJ SC/IM: CPT | Mod: S$GLB,,, | Performed by: INTERNAL MEDICINE

## 2019-11-21 PROCEDURE — 3008F PR BODY MASS INDEX (BMI) DOCUMENTED: ICD-10-PCS | Mod: CPTII,S$GLB,, | Performed by: INTERNAL MEDICINE

## 2019-11-21 PROCEDURE — 87880 STREP A ASSAY W/OPTIC: CPT

## 2019-11-21 PROCEDURE — 87081 CULTURE SCREEN ONLY: CPT

## 2019-11-21 PROCEDURE — 3008F BODY MASS INDEX DOCD: CPT | Mod: CPTII,S$GLB,, | Performed by: INTERNAL MEDICINE

## 2019-11-21 PROCEDURE — 99214 PR OFFICE/OUTPT VISIT, EST, LEVL IV, 30-39 MIN: ICD-10-PCS | Mod: 25,S$GLB,, | Performed by: INTERNAL MEDICINE

## 2019-11-21 PROCEDURE — 99999 PR PBB SHADOW E&M-EST. PATIENT-LVL IV: ICD-10-PCS | Mod: PBBFAC,,, | Performed by: INTERNAL MEDICINE

## 2019-11-21 PROCEDURE — 96372 PR INJECTION,THERAP/PROPH/DIAG2ST, IM OR SUBCUT: ICD-10-PCS | Mod: S$GLB,,, | Performed by: INTERNAL MEDICINE

## 2019-11-21 PROCEDURE — 99214 OFFICE O/P EST MOD 30 MIN: CPT | Mod: 25,S$GLB,, | Performed by: INTERNAL MEDICINE

## 2019-11-21 PROCEDURE — 99999 PR PBB SHADOW E&M-EST. PATIENT-LVL IV: CPT | Mod: PBBFAC,,, | Performed by: INTERNAL MEDICINE

## 2019-11-21 RX ORDER — AZITHROMYCIN 250 MG/1
TABLET, FILM COATED ORAL
Qty: 6 TABLET | Refills: 0 | Status: SHIPPED | OUTPATIENT
Start: 2019-11-21 | End: 2020-01-02 | Stop reason: CLARIF

## 2019-11-21 RX ORDER — TRIAMCINOLONE ACETONIDE 40 MG/ML
40 INJECTION, SUSPENSION INTRA-ARTICULAR; INTRAMUSCULAR
Status: COMPLETED | OUTPATIENT
Start: 2019-11-21 | End: 2019-11-21

## 2019-11-21 RX ADMIN — TRIAMCINOLONE ACETONIDE 40 MG: 40 INJECTION, SUSPENSION INTRA-ARTICULAR; INTRAMUSCULAR at 02:11

## 2019-11-21 NOTE — PROGRESS NOTES
"REASON FOR VISIT:  This is a 59-year-old female.  Since last night while   sleeping, she noticed her throat is feeling bad, hurting, and her left ear is   hurting.  No fever or chills.  Prior to that for "quite a while," she has been   having a nonproductive cough and postnasal drip at times.  She is not short of   breath.  She was seen for pharyngitis and upper respiratory infection back in   June and July.    PAST MEDICAL HISTORY:  Hypothyroid disease.  Fatty liver with nodular hyperplasia.  Anxiety disorder.    SOCIAL HISTORY:  Tobacco use, none.    MEDICATIONS:  Noted per MedCard.    PHYSICAL EXAMINATION:  VITAL SIGNS:  Per EPIC.  Temperature 98.1.  HEENT:  Right tympanic membrane is normal.  The left may be minimally hyperemic.    She has definite a tender left submandibular lymph gland.  The oropharynx is   definitely erythematous with possible ulceration in the mid throat.  LUNGS:  Clear.  HEART:  Regular rate and rhythm.    IMPRESSION:  Acute pharyngitis.    PLAN:  Throat screen was performed and Zithromax Z-SOFIA was prescribed.  SHE IS   ALLERGIC TO PENICILLIN.  Kenalog 40 mg IM.        JAM/RACH  dd: 11/21/2019 14:39:20 (CST)  td: 11/22/2019 01:07:34 (CST)  Doc ID   #[id number]  Job ID #[id number]    CC:       "

## 2019-11-22 ENCOUNTER — TELEPHONE (OUTPATIENT)
Dept: INTERNAL MEDICINE | Facility: CLINIC | Age: 59
End: 2019-11-22

## 2019-11-22 ENCOUNTER — PATIENT MESSAGE (OUTPATIENT)
Dept: INTERNAL MEDICINE | Facility: CLINIC | Age: 59
End: 2019-11-22

## 2019-11-22 DIAGNOSIS — J06.9 RECURRENT UPPER RESPIRATORY INFECTION (URI): Primary | ICD-10-CM

## 2019-11-22 NOTE — TELEPHONE ENCOUNTER
"Spoke with patient, will continue the benadryl at "night" and continue meds prscribed----- Message from Keyona Osuna sent at 11/22/2019  1:48 PM CST -----  Contact: Pt self Mobile [phone]  Patient is calling in regards to her saying that she came in on 11/21/2019 and she would like to know what she should take for an upper respiratory problem that she's having? She would like a call back please.     "

## 2019-11-24 LAB — BACTERIA THROAT CULT: NORMAL

## 2019-11-25 ENCOUNTER — TELEPHONE (OUTPATIENT)
Dept: INTERNAL MEDICINE | Facility: CLINIC | Age: 59
End: 2019-11-25

## 2019-11-25 RX ORDER — ONDANSETRON 4 MG/1
4 TABLET, FILM COATED ORAL EVERY 6 HOURS PRN
Qty: 12 TABLET | Refills: 0 | Status: SHIPPED | OUTPATIENT
Start: 2019-11-25 | End: 2020-01-02

## 2019-11-25 RX ORDER — PROMETHAZINE HYDROCHLORIDE 25 MG/1
25 TABLET ORAL EVERY 6 HOURS PRN
Qty: 12 TABLET | Refills: 0 | Status: SHIPPED | OUTPATIENT
Start: 2019-11-25 | End: 2020-03-05

## 2019-11-25 NOTE — TELEPHONE ENCOUNTER
Pt states she woke up feeling nauseated she don't know if it is antibiotic she is requesting medication zofran and phenegran pt is leaving at 3pm today she want to  the nausea medication

## 2019-12-03 DIAGNOSIS — Z78.0 MENOPAUSE: ICD-10-CM

## 2019-12-03 RX ORDER — ESTRADIOL AND NORETHINDRONE ACETATE .5; .1 MG/1; MG/1
1 TABLET ORAL DAILY
Qty: 84 TABLET | Refills: 0 | Status: SHIPPED | OUTPATIENT
Start: 2019-12-03 | End: 2020-03-05

## 2019-12-16 ENCOUNTER — PATIENT OUTREACH (OUTPATIENT)
Dept: ADMINISTRATIVE | Facility: OTHER | Age: 59
End: 2019-12-16

## 2019-12-31 ENCOUNTER — OFFICE VISIT (OUTPATIENT)
Dept: URGENT CARE | Facility: CLINIC | Age: 59
End: 2019-12-31
Payer: COMMERCIAL

## 2019-12-31 VITALS
BODY MASS INDEX: 31.82 KG/M2 | HEIGHT: 66 IN | WEIGHT: 198 LBS | RESPIRATION RATE: 16 BRPM | OXYGEN SATURATION: 98 % | DIASTOLIC BLOOD PRESSURE: 94 MMHG | TEMPERATURE: 98 F | SYSTOLIC BLOOD PRESSURE: 147 MMHG | HEART RATE: 83 BPM

## 2019-12-31 DIAGNOSIS — R11.0 NAUSEA: ICD-10-CM

## 2019-12-31 DIAGNOSIS — R53.83 FATIGUE, UNSPECIFIED TYPE: ICD-10-CM

## 2019-12-31 DIAGNOSIS — R10.9 RIGHT FLANK PAIN: Primary | ICD-10-CM

## 2019-12-31 LAB
BILIRUB UR QL STRIP: NEGATIVE
CTP QC/QA: YES
FLUAV AG NPH QL: NEGATIVE
FLUBV AG NPH QL: NEGATIVE
GLUCOSE UR QL STRIP: NEGATIVE
KETONES UR QL STRIP: NEGATIVE
LEUKOCYTE ESTERASE UR QL STRIP: NEGATIVE
PH, POC UA: 7 (ref 5–8)
POC BLOOD, URINE: NEGATIVE
POC NITRATES, URINE: NEGATIVE
PROT UR QL STRIP: NEGATIVE
SP GR UR STRIP: 1.01 (ref 1–1.03)
UROBILINOGEN UR STRIP-ACNC: NORMAL (ref 0.1–1.1)

## 2019-12-31 PROCEDURE — 87804 POCT INFLUENZA A/B: ICD-10-PCS | Mod: 59,QW,S$GLB, | Performed by: NURSE PRACTITIONER

## 2019-12-31 PROCEDURE — 96372 THER/PROPH/DIAG INJ SC/IM: CPT | Mod: S$GLB,,, | Performed by: NURSE PRACTITIONER

## 2019-12-31 PROCEDURE — 87804 INFLUENZA ASSAY W/OPTIC: CPT | Mod: QW,S$GLB,, | Performed by: NURSE PRACTITIONER

## 2019-12-31 PROCEDURE — 96372 PR INJECTION,THERAP/PROPH/DIAG2ST, IM OR SUBCUT: ICD-10-PCS | Mod: S$GLB,,, | Performed by: NURSE PRACTITIONER

## 2019-12-31 PROCEDURE — 81003 URINALYSIS AUTO W/O SCOPE: CPT | Mod: QW,S$GLB,, | Performed by: NURSE PRACTITIONER

## 2019-12-31 PROCEDURE — 99215 OFFICE O/P EST HI 40 MIN: CPT | Mod: 25,S$GLB,, | Performed by: NURSE PRACTITIONER

## 2019-12-31 PROCEDURE — S0119 ONDANSETRON 4 MG: HCPCS | Mod: S$GLB,,, | Performed by: NURSE PRACTITIONER

## 2019-12-31 PROCEDURE — S0119 PR ONDANSETRON, ORAL, 4MG: ICD-10-PCS | Mod: S$GLB,,, | Performed by: NURSE PRACTITIONER

## 2019-12-31 PROCEDURE — 81003 POCT URINALYSIS, DIPSTICK, AUTOMATED, W/O SCOPE: ICD-10-PCS | Mod: QW,S$GLB,, | Performed by: NURSE PRACTITIONER

## 2019-12-31 PROCEDURE — 99215 PR OFFICE/OUTPT VISIT, EST, LEVL V, 40-54 MIN: ICD-10-PCS | Mod: 25,S$GLB,, | Performed by: NURSE PRACTITIONER

## 2019-12-31 RX ORDER — ONDANSETRON 2 MG/ML
4 INJECTION INTRAMUSCULAR; INTRAVENOUS
Status: DISCONTINUED | OUTPATIENT
Start: 2019-12-31 | End: 2019-12-31

## 2019-12-31 RX ORDER — KETOROLAC TROMETHAMINE 30 MG/ML
30 INJECTION, SOLUTION INTRAMUSCULAR; INTRAVENOUS
Status: COMPLETED | OUTPATIENT
Start: 2019-12-31 | End: 2019-12-31

## 2019-12-31 RX ORDER — ONDANSETRON 4 MG/1
4 TABLET, ORALLY DISINTEGRATING ORAL
Status: COMPLETED | OUTPATIENT
Start: 2019-12-31 | End: 2019-12-31

## 2019-12-31 RX ORDER — ONDANSETRON 4 MG/1
4 TABLET, ORALLY DISINTEGRATING ORAL EVERY 8 HOURS PRN
Qty: 20 TABLET | Refills: 0 | Status: SHIPPED | OUTPATIENT
Start: 2019-12-31 | End: 2020-03-05

## 2019-12-31 RX ADMIN — KETOROLAC TROMETHAMINE 30 MG: 30 INJECTION, SOLUTION INTRAMUSCULAR; INTRAVENOUS at 04:12

## 2019-12-31 RX ADMIN — ONDANSETRON 4 MG: 4 TABLET, ORALLY DISINTEGRATING ORAL at 04:12

## 2019-12-31 NOTE — PATIENT INSTRUCTIONS
Return to Urgent Care or go to ER if symptoms worsen or fail to improve.  Follow up with PCP as recommended for further management.   No ibuprofen x 24 hours--may take tylenol as needed for pain.   May apply heat to the right flank x 15 minutes every 2-4 hours as needed for comfort.   Flank Pain, Uncertain Cause  The flank is the area between your upper abdomen and your back. Pain there is often caused by a problem with your kidneys. It might be a kidney infection or a kidney stone. Other causes of flank pain include spinal arthritis, a pinched nerve from a back injury, or a back muscle strain or spasm.  The cause of your flank pain is not certain. You may need other tests.  Home care  Follow these tips when caring for yourself at home:  · You may use acetaminophen or ibuprofen to control pain, unless your health care provider prescribed another medicine. If you have chronic liver or kidney disease, talk with your provider before taking these medicines. Also talk with your provider first if youve ever had a stomach ulcer or GI bleeding.  · If the pain is coming from your muscles, you may get relief with ice or heat. During the first 2 days after the injury, put an ice pack on the painful area for 20 minutes every 2 to 4 hours. This will reduce swelling and pain. A hot shower, hot bath, or heating pad works well for a muscle spasm. You can start with ice, then switch to heat after 2 days. You might find that alternating ice and heat works well. Use the method that feels the best to you.  Follow-up care  Follow up with your healthcare provider if your symptoms dont get better over the next few days.  When to seek medical advice  Call your healthcare provider right away if any of these happen:  · Repeated vomiting  · Fever of 100.4ºF (38ºC) or higher, or as directed by your health care provider  · Flank pain that gets worse  · Pain that spreads to the front of your belly (abdomen)  · Dizziness, weakness, or  fainting  · Blood in your urine  · Burning feeling when you urinate or the need to urinate often  · Pain in one of your legs that gets worse  · Numbness or weakness in a leg  Date Last Reviewed: 10/1/2016  © 3163-7477 [employer] [address] [place] [address]. All rights reserved. This information is not intended as a substitute for professional medical care. Always follow your healthcare professional's instructions.

## 2019-12-31 NOTE — PROGRESS NOTES
"Subjective:       Patient ID: Abiola PIÑA Suad is a 59 y.o. female.    Vitals:  height is 5' 6" (1.676 m) and weight is 89.8 kg (198 lb). Her temperature is 97.7 °F (36.5 °C). Her blood pressure is 147/94 (abnormal) and her pulse is 83. Her respiration is 16 and oxygen saturation is 98%.     Chief Complaint: Nausea    Patient reports Nausea since Sunday and vomiting once today.Patient reports mid back pain with leg cramps. Gall bladder removed years ago.     Nausea   This is a new problem. Episode onset: 3 days. The problem occurs constantly. The problem has been gradually worsening. Associated symptoms include nausea and vomiting (once today). Pertinent negatives include no arthralgias, chest pain, chills, congestion, coughing, fatigue, fever, headaches, joint swelling, myalgias, rash, sore throat, vertigo or weakness. Exacerbated by: laying down. Treatments tried: Tums. The treatment provided mild relief.       Constitution: Negative for chills, fatigue and fever.   HENT: Negative for congestion and sore throat.    Neck: Negative for painful lymph nodes.   Cardiovascular: Negative for chest pain and leg swelling.   Eyes: Negative for double vision and blurred vision.   Respiratory: Negative for cough and shortness of breath.    Gastrointestinal: Positive for nausea and vomiting (once today). Negative for diarrhea.   Genitourinary: Negative for dysuria, frequency, urgency and history of kidney stones.   Musculoskeletal: Positive for back pain (mid back). Negative for joint pain, joint swelling, muscle cramps and muscle ache.   Skin: Negative for color change, pale, rash and bruising.   Allergic/Immunologic: Negative for seasonal allergies.   Neurological: Negative for dizziness, history of vertigo, light-headedness, passing out and headaches.   Hematologic/Lymphatic: Negative for swollen lymph nodes.   Psychiatric/Behavioral: Negative for nervous/anxious, sleep disturbance and depression. The patient is not " nervous/anxious.        Objective:      Physical Exam   Constitutional: She is oriented to person, place, and time. She appears well-developed and well-nourished.   HENT:   Head: Normocephalic and atraumatic.   Right Ear: External ear normal.   Left Ear: External ear normal.   Nose: Nose normal.   Mouth/Throat: Mucous membranes are normal.   Eyes: Conjunctivae and lids are normal.   Neck: Trachea normal and full passive range of motion without pain. Neck supple.   Cardiovascular: Normal rate, regular rhythm and normal heart sounds.   Pulmonary/Chest: Effort normal and breath sounds normal. No respiratory distress.   Abdominal: Soft. Normal appearance and bowel sounds are normal. She exhibits no distension, no abdominal bruit, no pulsatile midline mass and no mass. There is no tenderness.   Musculoskeletal: Normal range of motion. She exhibits no edema.   Neurological: She is alert and oriented to person, place, and time. She has normal strength.   Skin: Skin is warm, dry, intact, not diaphoretic and not pale.   Psychiatric: She has a normal mood and affect. Her speech is normal and behavior is normal. Judgment and thought content normal. Cognition and memory are normal.   Nursing note and vitals reviewed.    Results for orders placed or performed in visit on 12/31/19   POCT Urinalysis, Dipstick, Automated, W/O Scope   Result Value Ref Range    POC Blood, Urine Negative Negative    POC Bilirubin, Urine Negative Negative    POC Urobilinogen, Urine Normal 0.1 - 1.1    POC Ketones, Urine Negative Negative    POC Protein, Urine Negative Negative    POC Nitrates, Urine Negative Negative    POC Glucose, Urine Negative Negative    pH, UA 7.0 5 - 8    POC Specific Gravity, Urine 1.010 1.003 - 1.029    POC Leukocytes, Urine Negative Negative   POCT Influenza A/B   Result Value Ref Range    Rapid Influenza A Ag Negative Negative    Rapid Influenza B Ag Negative Negative     Acceptable Yes            Assessment:        1. Right flank pain    2. Nausea    3. Fatigue, unspecified type        Plan:         Right flank pain  -     ketorolac injection 30 mg    Nausea  -     POCT Urinalysis, Dipstick, Automated, W/O Scope  -     POCT Influenza A/B  -     Discontinue: ondansetron injection 4 mg  -     ondansetron (ZOFRAN-ODT) 4 MG TbDL; Take 1 tablet (4 mg total) by mouth every 8 (eight) hours as needed (nausea).  Dispense: 20 tablet; Refill: 0  -     ondansetron disintegrating tablet 4 mg    Fatigue, unspecified type  -     POCT Influenza A/B         Discussed with patient that her symptoms are most likely due to a right kidney/ureteral stone. She is tolerating oral intake, voiding.  ER precautions given to patient and daughter who verbalized agreement with plan.

## 2020-01-01 ENCOUNTER — HOSPITAL ENCOUNTER (EMERGENCY)
Facility: HOSPITAL | Age: 60
Discharge: HOME | End: 2020-01-01
Attending: EMERGENCY MEDICINE
Payer: COMMERCIAL

## 2020-01-01 VITALS
WEIGHT: 196 LBS | DIASTOLIC BLOOD PRESSURE: 77 MMHG | BODY MASS INDEX: 31.5 KG/M2 | OXYGEN SATURATION: 99 % | RESPIRATION RATE: 18 BRPM | HEART RATE: 87 BPM | SYSTOLIC BLOOD PRESSURE: 140 MMHG | TEMPERATURE: 98 F | HEIGHT: 66 IN

## 2020-01-01 DIAGNOSIS — R11.0 NAUSEA: Primary | ICD-10-CM

## 2020-01-01 DIAGNOSIS — M54.89 PAIN IN RIGHT PARASPINAL REGION: ICD-10-CM

## 2020-01-01 DIAGNOSIS — R10.13 EPIGASTRIC PAIN: ICD-10-CM

## 2020-01-01 LAB
ALBUMIN SERPL BCP-MCNC: 4 G/DL (ref 3.5–5.2)
ALP SERPL-CCNC: 85 U/L (ref 55–135)
ALT SERPL W/O P-5'-P-CCNC: 41 U/L (ref 10–44)
ANION GAP SERPL CALC-SCNC: 10 MMOL/L (ref 8–16)
AST SERPL-CCNC: 30 U/L (ref 10–40)
BASOPHILS # BLD AUTO: 0.02 K/UL (ref 0–0.2)
BASOPHILS NFR BLD: 0.2 % (ref 0–1.9)
BILIRUB SERPL-MCNC: 0.5 MG/DL (ref 0.1–1)
BILIRUB UR QL STRIP: NEGATIVE
BUN SERPL-MCNC: 9 MG/DL (ref 8–23)
CALCIUM SERPL-MCNC: 9.3 MG/DL (ref 8.7–10.5)
CHLORIDE SERPL-SCNC: 105 MMOL/L (ref 95–110)
CLARITY UR REFRACT.AUTO: CLEAR
CO2 SERPL-SCNC: 25 MMOL/L (ref 23–29)
COLOR UR AUTO: COLORLESS
CREAT SERPL-MCNC: 0.7 MG/DL (ref 0.5–1.4)
DIFFERENTIAL METHOD: ABNORMAL
EOSINOPHIL # BLD AUTO: 0 K/UL (ref 0–0.5)
EOSINOPHIL NFR BLD: 0.2 % (ref 0–8)
ERYTHROCYTE [DISTWIDTH] IN BLOOD BY AUTOMATED COUNT: 13.1 % (ref 11.5–14.5)
EST. GFR  (AFRICAN AMERICAN): >60 ML/MIN/1.73 M^2
EST. GFR  (NON AFRICAN AMERICAN): >60 ML/MIN/1.73 M^2
GLUCOSE SERPL-MCNC: 99 MG/DL (ref 70–110)
GLUCOSE UR QL STRIP: NEGATIVE
HCT VFR BLD AUTO: 44.9 % (ref 37–48.5)
HGB BLD-MCNC: 14.3 G/DL (ref 12–16)
HGB UR QL STRIP: NEGATIVE
IMM GRANULOCYTES # BLD AUTO: 0.03 K/UL (ref 0–0.04)
IMM GRANULOCYTES NFR BLD AUTO: 0.3 % (ref 0–0.5)
KETONES UR QL STRIP: NEGATIVE
LEUKOCYTE ESTERASE UR QL STRIP: NEGATIVE
LIPASE SERPL-CCNC: 26 U/L (ref 4–60)
LYMPHOCYTES # BLD AUTO: 2.1 K/UL (ref 1–4.8)
LYMPHOCYTES NFR BLD: 21.4 % (ref 18–48)
MCH RBC QN AUTO: 29 PG (ref 27–31)
MCHC RBC AUTO-ENTMCNC: 31.8 G/DL (ref 32–36)
MCV RBC AUTO: 91 FL (ref 82–98)
MONOCYTES # BLD AUTO: 0.6 K/UL (ref 0.3–1)
MONOCYTES NFR BLD: 5.8 % (ref 4–15)
NEUTROPHILS # BLD AUTO: 7 K/UL (ref 1.8–7.7)
NEUTROPHILS NFR BLD: 72.1 % (ref 38–73)
NITRITE UR QL STRIP: NEGATIVE
NRBC BLD-RTO: 0 /100 WBC
PH UR STRIP: 7 [PH] (ref 5–8)
PLATELET # BLD AUTO: 277 K/UL (ref 150–350)
PMV BLD AUTO: 9.8 FL (ref 9.2–12.9)
POTASSIUM SERPL-SCNC: 3.5 MMOL/L (ref 3.5–5.1)
PROT SERPL-MCNC: 7.7 G/DL (ref 6–8.4)
PROT UR QL STRIP: NEGATIVE
RBC # BLD AUTO: 4.93 M/UL (ref 4–5.4)
SODIUM SERPL-SCNC: 140 MMOL/L (ref 136–145)
SP GR UR STRIP: 1 (ref 1–1.03)
URN SPEC COLLECT METH UR: ABNORMAL
WBC # BLD AUTO: 9.64 K/UL (ref 3.9–12.7)

## 2020-01-01 PROCEDURE — 99284 EMERGENCY DEPT VISIT MOD MDM: CPT | Mod: 25

## 2020-01-01 PROCEDURE — 83690 ASSAY OF LIPASE: CPT

## 2020-01-01 PROCEDURE — 85025 COMPLETE CBC W/AUTO DIFF WBC: CPT

## 2020-01-01 PROCEDURE — 99284 PR EMERGENCY DEPT VISIT,LEVEL IV: ICD-10-PCS | Mod: ,,, | Performed by: EMERGENCY MEDICINE

## 2020-01-01 PROCEDURE — S0028 INJECTION, FAMOTIDINE, 20 MG: HCPCS | Performed by: EMERGENCY MEDICINE

## 2020-01-01 PROCEDURE — 99284 EMERGENCY DEPT VISIT MOD MDM: CPT | Mod: ,,, | Performed by: EMERGENCY MEDICINE

## 2020-01-01 PROCEDURE — 81003 URINALYSIS AUTO W/O SCOPE: CPT

## 2020-01-01 PROCEDURE — 96374 THER/PROPH/DIAG INJ IV PUSH: CPT

## 2020-01-01 PROCEDURE — 80053 COMPREHEN METABOLIC PANEL: CPT

## 2020-01-01 PROCEDURE — 25000003 PHARM REV CODE 250: Performed by: EMERGENCY MEDICINE

## 2020-01-01 RX ORDER — PANTOPRAZOLE SODIUM 20 MG/1
20 TABLET, DELAYED RELEASE ORAL DAILY
Qty: 30 TABLET | Refills: 0 | Status: SHIPPED | OUTPATIENT
Start: 2020-01-01 | End: 2020-03-05

## 2020-01-01 RX ORDER — FAMOTIDINE 10 MG/ML
20 INJECTION INTRAVENOUS
Status: COMPLETED | OUTPATIENT
Start: 2020-01-01 | End: 2020-01-01

## 2020-01-01 RX ADMIN — ALUMINUM HYDROXIDE, MAGNESIUM HYDROXIDE, AND SIMETHICONE 50 ML: 200; 200; 20 SUSPENSION ORAL at 02:01

## 2020-01-01 RX ADMIN — FAMOTIDINE 20 MG: 10 INJECTION, SOLUTION INTRAVENOUS at 02:01

## 2020-01-01 NOTE — ED PROVIDER NOTES
Encounter Date: 2020    SCRIBE #1 NOTE: I, Mt Pérez, am scribing for, and in the presence of, Dr. Redd.       History     Chief Complaint   Patient presents with    Back Pain     right back pain, nausea, vomiting, onset symptoms onset 3 days ago, pt reports being seen at urgent care yesterday discharge possible kidney stone.     Ms. Borjas is a 59 y.o. Woman with PMHX of thyroid disease, anxiety, hypoglycemia presents with a chief complaint of back pain. Pt endorses right back pain and nausea onset 3 days ago. Pt states she took 1 Cymbalta Saturday and woke up  with nausea and anxiety. States nausea persisted up to Tuesday along with onset of intermittent back pain. Pt states she made herself vomit one yesterday. States she went to urgent care and her urine was negative for infection, they discharged with possibility of kidney stone. States she was prescribed zofran and Toradol. Endorses nausea now, denies vomiting on her own today. Endorses some mild abdominal pain.     Of note, patient reports daily alcohol use.  Last drink was 2 days ago.    The history is provided by the patient.     Review of patient's allergies indicates:   Allergen Reactions    Cephalosporins Swelling    Penicillin g Swelling     Past Medical History:   Diagnosis Date    Anxiety     Deviated septum     Diverticulosis of sigmoid colon     Focal nodular hyperplasia of liver     Hypoglycemia     Hypothyroidism     Menopause     PONV (postoperative nausea and vomiting)     Snores     Thyroid disease      Past Surgical History:   Procedure Laterality Date    ABDOMINAL HERNIA REPAIR       SECTION      CHOLECYSTECTOMY      CO[facility]OMY  2015    [facility] for stricture    COLON SURGERY      COLON SURGERY      HERNIA REPAIR       Family History   Problem Relation Age of Onset    Diabetes Mother     Hypertension Mother     Kidney cancer Father     Depression Sister     Breast cancer Paternal  Aunt 70    Prostate cancer Other     Leukemia Other         Medicine induced from chemo    Colon cancer Neg Hx     Miscarriages / Stillbirths Neg Hx      Social History     Tobacco Use    Smoking status: Never Smoker    Smokeless tobacco: Never Used   Substance Use Topics    Alcohol use: Yes     Alcohol/week: 0.0 standard drinks     Comment: daily ETOH, wine daily, last use Friday    Drug use: No     Review of Systems   Constitutional: Negative for chills and fever.   HENT: Negative for sore throat.    Respiratory: Negative for shortness of breath.    Cardiovascular: Negative for chest pain.   Gastrointestinal: Positive for abdominal pain and nausea.   Genitourinary: Negative for dysuria.   Musculoskeletal: Positive for back pain.   Skin: Negative for rash.   Neurological: Negative for weakness.   Hematological: Does not bruise/bleed easily.       Physical Exam     Initial Vitals [01/01/20 1352]   BP Pulse Resp Temp SpO2   (!) 158/74 88 20 98.3 °F (36.8 °C) 99 %      MAP       --         Physical Exam    Nursing note and vitals reviewed.  Constitutional: She appears well-developed and well-nourished. She is not diaphoretic. No distress.   HENT:   Head: Normocephalic and atraumatic.   Mouth/Throat: Oropharynx is clear and moist.   Neck: Normal range of motion. Neck supple. No JVD present.   Cardiovascular: Normal rate, regular rhythm, normal heart sounds and intact distal pulses.   Pulmonary/Chest: Breath sounds normal. No respiratory distress. She has no wheezes. She has no rhonchi. She has no rales.   Abdominal: Soft. She exhibits no distension. There is no rebound and no guarding.   Epigastric tenderness.    Musculoskeletal: Normal range of motion. She exhibits no edema.   Right lumbar paraspinal tenderness. Negative CVA tenderness.    Lymphadenopathy:     She has no cervical adenopathy.   Neurological: She is alert and oriented to person, place, and time. She has normal strength. No cranial nerve deficit  or sensory deficit.   Skin: Skin is warm and dry.         ED Course   Procedures  Labs Reviewed   URINALYSIS, REFLEX TO URINE CULTURE - Abnormal; Notable for the following components:       Result Value    Color, UA Colorless (*)     Specific [place], UA 1.000 (*)     All other components within normal limits    Narrative:     Preferred Collection Type->Urine, Clean Catch   CBC W/ AUTO DIFFERENTIAL - Abnormal; Notable for the following components:    Mean Corpuscular Hemoglobin Conc 31.8 (*)     All other components within normal limits   COMPREHENSIVE METABOLIC PANEL   LIPASE          Imaging Results    None          Medical Decision Making:   History:   Old Medical Records: I decided to obtain old medical records.  Initial Assessment:   Urgent evaluation a 59-year-old female here today with persistent nausea, decreased p.o. Intake.  Patient also complains of right-sided paraspinal tenderness, which appears to be musculoskeletal in nature.  I have low suspicion for renal colic or kidney stone.    Patient does report frequent alcohol use, symptoms appear to be more related to gastritis versus gastric ulcer.  Patient was treated with GI cocktail and Pepcid    Clinical Tests:   Lab Tests: Ordered and Reviewed  ED Management:  - labs  - Pepcid  - GI cocktail    CBC reviewed with no significant abnormality.  CMP and lipase hemolyzed therefore will be redrawn.    Patient was re-evaluated with significant improvement of symptoms after Pepcid and GI cocktail.  I have discussed with her and her daughter at bedside regarding complications of alcohol use.    Patient will be signed out to Dr. Leger and in stable condition pending repeat labs.  I anticipate discharge            Scribe Attestation:   Scribe #1: I performed the above scribed service and the documentation accurately describes the services I performed. I attest to the accuracy of the note.    Attending Attestation:           Physician Attestation for  Scribe:      Comments: I, Dr. Lanette Redd, personally performed the services described in this documentation. All medical record entries made by the scribe were at my direction and in my presence.  I have reviewed the chart and agree that the record reflects my personal performance and is accurate and complete. Lanette Redd MD.                            Clinical Impression:       ICD-10-CM ICD-9-CM   1. Nausea R11.0 787.02   2. Epigastric pain R10.13 789.06   3. Pain in right paraspinal region M54.89 724.5                             Lanette Redd MD  01/01/20 1612

## 2020-01-01 NOTE — PROVIDER PROGRESS NOTES - EMERGENCY DEPT.
Encounter Date: 1/1/2020    ED Physician Progress Notes        Physician Note:   Labs benign, okay to discharge per Dr. Redd.

## 2020-01-01 NOTE — ED NOTES
Patient identifiers verified and correct for Ms Borjas  C/C: Intermittent back pain, none upon admit to ED SEE NN  APPEARANCE: awake and alert in NAD.  SKIN: warm, dry and intact. No breakdown or bruising.  MUSCULOSKELETAL: Patient moving all extremities spontaneously, no obvious swelling or deformities noted. Ambulates independently.  RESPIRATORY: Denies shortness of breath.Respirations unlabored.   CARDIAC: Denies CP, 2+ distal pulses; no peripheral edema  ABDOMEN: Abdomen soft, denies pain, positive nausea, no emesis  : voids spontaneously, denies difficulty  Neurologic: AAO x 4; follows commands equal strength in all extremities; denies numbness/tingling. Denies dizziness Positive lightheaded, midsternal back pain, non radiating,

## 2020-01-01 NOTE — ED TRIAGE NOTES
Patient states nausea on Saturday, states continues with nausea,no emesis,  lightheaded, seen at Urgent care for same yesterday, rx Zofran, unable to fill rx.  States intermittent mid back pain, none upon admit to ED. Xanax PTA

## 2020-01-02 ENCOUNTER — OFFICE VISIT (OUTPATIENT)
Dept: INTERNAL MEDICINE | Facility: CLINIC | Age: 60
End: 2020-01-02
Payer: COMMERCIAL

## 2020-01-02 ENCOUNTER — TELEPHONE (OUTPATIENT)
Dept: GASTROENTEROLOGY | Facility: CLINIC | Age: 60
End: 2020-01-02

## 2020-01-02 ENCOUNTER — LAB VISIT (OUTPATIENT)
Dept: LAB | Facility: HOSPITAL | Age: 60
End: 2020-01-02
Attending: INTERNAL MEDICINE
Payer: COMMERCIAL

## 2020-01-02 VITALS
DIASTOLIC BLOOD PRESSURE: 88 MMHG | HEART RATE: 77 BPM | BODY MASS INDEX: 31.64 KG/M2 | OXYGEN SATURATION: 98 % | SYSTOLIC BLOOD PRESSURE: 128 MMHG | WEIGHT: 196 LBS

## 2020-01-02 DIAGNOSIS — K52.9 GASTROENTERITIS, ACUTE: ICD-10-CM

## 2020-01-02 DIAGNOSIS — E03.9 HYPOTHYROIDISM, UNSPECIFIED TYPE: ICD-10-CM

## 2020-01-02 DIAGNOSIS — M54.6 ACUTE RIGHT-SIDED THORACIC BACK PAIN: ICD-10-CM

## 2020-01-02 DIAGNOSIS — J06.9 RECURRENT UPPER RESPIRATORY INFECTION (URI): ICD-10-CM

## 2020-01-02 DIAGNOSIS — R11.0 NAUSEA: ICD-10-CM

## 2020-01-02 DIAGNOSIS — R11.0 NAUSEA: Primary | ICD-10-CM

## 2020-01-02 PROCEDURE — 99214 OFFICE O/P EST MOD 30 MIN: CPT | Mod: S$GLB,,, | Performed by: INTERNAL MEDICINE

## 2020-01-02 PROCEDURE — 84439 ASSAY OF FREE THYROXINE: CPT

## 2020-01-02 PROCEDURE — 82550 ASSAY OF CK (CPK): CPT

## 2020-01-02 PROCEDURE — 86317 IMMUNOASSAY INFECTIOUS AGENT: CPT

## 2020-01-02 PROCEDURE — 84443 ASSAY THYROID STIM HORMONE: CPT

## 2020-01-02 PROCEDURE — 82306 VITAMIN D 25 HYDROXY: CPT

## 2020-01-02 PROCEDURE — 99999 PR PBB SHADOW E&M-EST. PATIENT-LVL IV: ICD-10-PCS | Mod: PBBFAC,,, | Performed by: INTERNAL MEDICINE

## 2020-01-02 PROCEDURE — 86140 C-REACTIVE PROTEIN: CPT

## 2020-01-02 PROCEDURE — 3008F PR BODY MASS INDEX (BMI) DOCUMENTED: ICD-10-PCS | Mod: CPTII,S$GLB,, | Performed by: INTERNAL MEDICINE

## 2020-01-02 PROCEDURE — 86677 HELICOBACTER PYLORI ANTIBODY: CPT

## 2020-01-02 PROCEDURE — 84481 FREE ASSAY (FT-3): CPT

## 2020-01-02 PROCEDURE — 99214 PR OFFICE/OUTPT VISIT, EST, LEVL IV, 30-39 MIN: ICD-10-PCS | Mod: S$GLB,,, | Performed by: INTERNAL MEDICINE

## 2020-01-02 PROCEDURE — 3008F BODY MASS INDEX DOCD: CPT | Mod: CPTII,S$GLB,, | Performed by: INTERNAL MEDICINE

## 2020-01-02 PROCEDURE — 99999 PR PBB SHADOW E&M-EST. PATIENT-LVL IV: CPT | Mod: PBBFAC,,, | Performed by: INTERNAL MEDICINE

## 2020-01-02 RX ORDER — DULOXETIN HYDROCHLORIDE 20 MG/1
CAPSULE, DELAYED RELEASE ORAL
COMMUNITY
Start: 2019-12-05 | End: 2020-03-05

## 2020-01-02 NOTE — TELEPHONE ENCOUNTER
----- Message from Maritza Jones sent at 1/2/2020 10:09 AM CST -----  Contact: pt [phone]  Pt is calling to speak with Marjan.  Pt needs a appt and wanted to speak with you first.  Please call pt.

## 2020-01-02 NOTE — PROGRESS NOTES
"REASON FOR VISIT:  This is a 59-year-old female who comes in for Urgent   Care/Emergency Room followup.  She was seen in Urgent Care on December 31st and   the Emergency Room yesterday.  She presented with pain in her midback, mainly   the right flank, and also feeling nauseated.  She only had one episode of   vomiting.  There was no abdominal pain, but she did feel bloated.  Her p.o.   intake was diminished because of such.  She woke up with chills on Saturday   morning, December 28th and then started feeling nauseated on Sunday, December 29th, all the way through.  Her urinalysis and influenza tests were negative.    When she went to the Emergency Room, her lipase, chemistry, and CBC were normal.    In Urgent Care, she received a Toradol injection, was given pantoprazole, and   then yesterday in the Emergency Room, she was given a prescription for Zofran.    She has not taken the pantoprazole medication yet.    PAST MEDICAL HISTORY:  Hypothyroid.  Anxiety disorder with probable depression.  Fatty liver disease with nodular hyperplasia.    CURRENT MEDICATIONS:  Xanax 0.5 mg once a day as needed.  Cytomel 25 mcg.  Pantoprazole 20 mg, which was prescribed.  Synthroid 0.088 mg.  Estradiol-norethindrone hormone medication.    ADDENDUM:  The patient also had a laparoscopic sigmoid colectomy in 2014 for   recurrent diverticulitis and then she said at some point later, she had to go to   [facility] to have another surgery because there was "a leak."    A colonoscopy procedure in September 2015 showed a stricture at the colonic   anastomosis.  It was dilated at that time.    PHYSICAL EXAMINATION:  VITAL SIGNS:  Weight is 196, pulse 76, and blood pressure 128/72.  GENERAL:  She is tired appearing.  LUNGS:  Clear.  HEART:  Regular rate and rhythm.  ABDOMEN:  Active bowel sounds, soft, really no tenderness to palpation.  She   does have a degree of tenderness over the right lateral lower thoracic wall.    IMPRESSION:  1. " Nausea.  2. Right thoracic pain, probably that of muscular strain.  3. This may have been a viral syndrome.    PLAN:  Today we will get a C-reactive protein and Helicobacter pylori.  I would   recommend that she at least take the pantoprazole for a week or two 20 mg.    Follow up with Gastroenterology.  She may need to have an upper endoscopy and   another colonoscopy.  If the symptoms still persist, we will consider doing a CT   of the abdomen and pelvis.    ADDENDUM:  Today while she is here, she was to get labs of streptococcal   serotypes and humoral immune evaluation due to recurrent upper respiratory   infections.  We will get this today.  She had a pneumococcal 23 in September 2019.        KENNETH/RACH  dd: 01/02/2020 13:44:40 (CST)  td: 01/02/2020 23:43:47 (CST)  Doc ID   #[id number]  Job ID #[id number]    CC:

## 2020-01-02 NOTE — TELEPHONE ENCOUNTER
Spoke with patient.  C/o nausea since Sunday.  Went to urgent care and ED.  Was told she could possible have gastritis or an ulcer. Was prescribed Pantoprazole and Zofran. Has an appointment to see , her PCP,today.  Requesting an appointment to see Dr.James Gonzalez.                                                                                    Appointment scheduled for her to see  and Dr.James Gonzalez on 1/8/2020 for 1:00.   Confirmation mailed.

## 2020-01-03 ENCOUNTER — PATIENT MESSAGE (OUTPATIENT)
Dept: INTERNAL MEDICINE | Facility: CLINIC | Age: 60
End: 2020-01-03

## 2020-01-03 LAB
25(OH)D3+25(OH)D2 SERPL-MCNC: 48 NG/ML (ref 30–96)
CK SERPL-CCNC: 130 U/L (ref 20–180)
CRP SERPL-MCNC: 1.8 MG/L (ref 0–8.2)
T3FREE SERPL-MCNC: 3.3 PG/ML (ref 2.3–4.2)
T4 FREE SERPL-MCNC: 1.18 NG/DL (ref 0.71–1.51)
TSH SERPL DL<=0.005 MIU/L-ACNC: 3.04 UIU/ML (ref 0.4–4)

## 2020-01-04 LAB — H PYLORI IGG SERPL QL IA: NEGATIVE

## 2020-01-06 ENCOUNTER — PATIENT OUTREACH (OUTPATIENT)
Dept: ADMINISTRATIVE | Facility: OTHER | Age: 60
End: 2020-01-06

## 2020-01-07 ENCOUNTER — PATIENT MESSAGE (OUTPATIENT)
Dept: INTERNAL MEDICINE | Facility: CLINIC | Age: 60
End: 2020-01-07

## 2020-01-07 LAB
DEPRECATED S PNEUM 1 IGG SER-MCNC: 22.1 MCG/ML
DEPRECATED S PNEUM12 IGG SER-MCNC: >30 MCG/ML
DEPRECATED S PNEUM14 IGG SER-MCNC: 4 MCG/ML
DEPRECATED S PNEUM19 IGG SER-MCNC: >139 MCG/ML
DEPRECATED S PNEUM23 IGG SER-MCNC: 3.1 MCG/ML
DEPRECATED S PNEUM3 IGG SER-MCNC: 5.5 MCG/ML
DEPRECATED S PNEUM4 IGG SER-MCNC: 5.3 MCG/ML
DEPRECATED S PNEUM5 IGG SER-MCNC: 1.5 MCG/ML
DEPRECATED S PNEUM8 IGG SER-MCNC: 5.3 MCG/ML
DEPRECATED S PNEUM9 IGG SER-MCNC: 9.5 MCG/ML
S PNEUM DA 18C IGG SER-MCNC: 33.3 MCG/ML
S PNEUM DA 6B IGG SER-MCNC: 1.9 MCG/ML
S PNEUM DA 7F IGG SER-MCNC: 18.8 MCG/ML
S PNEUM DA 9V IGG SER-MCNC: 2 MCG/ML

## 2020-01-08 ENCOUNTER — OFFICE VISIT (OUTPATIENT)
Dept: GASTROENTEROLOGY | Facility: CLINIC | Age: 60
End: 2020-01-08
Payer: COMMERCIAL

## 2020-01-08 VITALS
SYSTOLIC BLOOD PRESSURE: 127 MMHG | HEIGHT: 66 IN | WEIGHT: 199.94 LBS | BODY MASS INDEX: 32.13 KG/M2 | HEART RATE: 99 BPM | DIASTOLIC BLOOD PRESSURE: 78 MMHG

## 2020-01-08 DIAGNOSIS — R11.0 NAUSEA: Primary | ICD-10-CM

## 2020-01-08 DIAGNOSIS — D12.6 TUBULAR ADENOMA OF COLON: ICD-10-CM

## 2020-01-08 DIAGNOSIS — K29.60 NSAID INDUCED GASTRITIS: ICD-10-CM

## 2020-01-08 DIAGNOSIS — T39.395A NSAID INDUCED GASTRITIS: ICD-10-CM

## 2020-01-08 PROCEDURE — 99204 PR OFFICE/OUTPT VISIT, NEW, LEVL IV, 45-59 MIN: ICD-10-PCS | Mod: S$GLB,,, | Performed by: STUDENT IN AN ORGANIZED HEALTH CARE EDUCATION/TRAINING PROGRAM

## 2020-01-08 PROCEDURE — 3008F PR BODY MASS INDEX (BMI) DOCUMENTED: ICD-10-PCS | Mod: CPTII,S$GLB,, | Performed by: STUDENT IN AN ORGANIZED HEALTH CARE EDUCATION/TRAINING PROGRAM

## 2020-01-08 PROCEDURE — 99999 PR PBB SHADOW E&M-EST. PATIENT-LVL IV: CPT | Mod: PBBFAC,,, | Performed by: STUDENT IN AN ORGANIZED HEALTH CARE EDUCATION/TRAINING PROGRAM

## 2020-01-08 PROCEDURE — 99204 OFFICE O/P NEW MOD 45 MIN: CPT | Mod: S$GLB,,, | Performed by: STUDENT IN AN ORGANIZED HEALTH CARE EDUCATION/TRAINING PROGRAM

## 2020-01-08 PROCEDURE — 3008F BODY MASS INDEX DOCD: CPT | Mod: CPTII,S$GLB,, | Performed by: STUDENT IN AN ORGANIZED HEALTH CARE EDUCATION/TRAINING PROGRAM

## 2020-01-08 PROCEDURE — 99999 PR PBB SHADOW E&M-EST. PATIENT-LVL IV: ICD-10-PCS | Mod: PBBFAC,,, | Performed by: STUDENT IN AN ORGANIZED HEALTH CARE EDUCATION/TRAINING PROGRAM

## 2020-01-08 NOTE — PROGRESS NOTES
GASTROENTEROLOGY CLINIC NOTE    Reason for visit: There were no encounter diagnoses.  Referring provider/PCP: César Contreras MD    HPI:  Abiola Borjas is a 59 y.o. female with medical history of hypothyroidism, anxiety, hx of tubular adenoma (2014), hx of cholecystectomy (2006), and hx of sigmoid colectomy in 2014 for recurrent diverticulitis followed by another colon surgery at [facility] for colonic stricture who is here as a new patient for chief complaint of nausea. Patient reports that her  passed away from leukemia in Oct 2018 and since then she has been having a difficult time. She reports the holidays are especially hard and in late December she developed symptoms of severe nausea. She reported that on Saturday night she went out to eat sushi and later in the evening began feeling nauseated. She never had any vomiting, abdominal pain, or diarrhea. Her nausea persisted and she developed extreme fatigue as well. She went to Urgent Care where she was given Zofran which helped control her nausea. She went to the ED as well and was given GI cocktail and protonix (which she did not take because she was scared of the side effects). She reports due to muscle strain in her back she has been taking Advil 3-4 tablets daily for the last several months. She also reports drinking glass of wine nightly for the past year or so. Due to her Advil use, she was told she may have an ulcer. She started taking Pepcid a few days ago and has stopped Advil also. She does report feeling significantly better. Her nausea has improved completely and she is regaining her appetite. She is otherwise feeling well today.      GI ROS:  Reflux: No  Dysphagia: No   Constipation: No  Diarrhea: No  Rectal bleeding/Melena/hematemesis: No  NSAIDs: Yes  Anticoagulation: No  Anti-platelet therapy: No    Prior GI studies:  Colon 9/15/15:   Impression:    - Stricture at the colonic anastomosis. Dilated.                        - No  specimens collected.    Colonoscopy 2014  - 7mm tubular adenoma    Review of Systems   Constitutional: Negative for chills, fever, malaise/fatigue and weight loss.   HENT: Negative for congestion and sore throat.    Respiratory: Negative for cough and shortness of breath.    Cardiovascular: Negative for chest pain and leg swelling.   Gastrointestinal: Positive for nausea. Negative for abdominal pain, blood in stool, constipation, diarrhea, heartburn, melena and vomiting.   Genitourinary: Negative for dysuria and urgency.   Musculoskeletal: Positive for back pain. Negative for myalgias and neck pain.   Skin: Negative for itching and rash.   Neurological: Negative for dizziness and headaches.       Past Medical History: has a past medical history of Anxiety, Deviated septum, Diverticulosis of sigmoid colon, Focal nodular hyperplasia of liver, Hypoglycemia, Hypothyroidism, Menopause, PONV (postoperative nausea and vomiting), Snores, and Thyroid disease.    Past Surgical History: has a past surgical history that includes Cholecystectomy;  section; Abdominal hernia repair; Hernia repair; Colon surgery; Colectomy (2015); and Colon surgery.    Family History:family history includes Breast cancer (age of onset: 70) in her paternal aunt; Depression in her sister; Diabetes in her mother; Hypertension in her mother; Kidney cancer in her father; Leukemia in her other; Prostate cancer in her other.    Allergies: Reviewed in EPIC.     Social History: reports that she has never smoked. She has never used smokeless tobacco. She reports that she drinks alcohol. She reports that she does not use drugs.    Home medications:   Current Outpatient Medications on File Prior to Visit   Medication Sig Dispense Refill    ALPRAZolam (XANAX) 0.5 MG tablet Take 0.5 mg by mouth 5 (five) times daily.      CYTOMEL 25 mcg Tab TAKE 1/2 TABLET BY MOUTH EVERY EVENING 45 tablet 3    diphenhydrAMINE (BENADRYL) 25 mg capsule Take 12.5  "mg by mouth.      DULoxetine (CYMBALTA) 20 MG capsule       estradiol-norethindrone acet 0.5-0.1 mg per tablet TAKE 1 TABLET BY MOUTH ONCE DAILY. PLEASE KEEP ANNUAL APPOINTMENT FOR ADDITIONAL REFILLS 84 tablet 0    fluconazole (DIFLUCAN) 150 MG Tab 1 tablet orally every other day for 8 days 2 tablet 1    fluticasone (FLONASE) 50 mcg/actuation nasal spray 2 sprays by Nasal route.      ibuprofen (ADVIL,MOTRIN) 200 MG tablet Take 200 mg by mouth.      lancets Misc 1 Device by Misc.(Non-Drug; Combo Route) route 2 (two) times daily. 100 each 11    melatonin 3 mg Tab Take 5 mg by mouth.      multivitamin capsule Take 1 capsule by mouth once daily.      mupirocin (BACTROBAN) 2 % ointment Apply topically 2 (two) times daily. 15 g 0    SYNTHROID 88 mcg tablet TAKE 1 TABLET (88 MCG TOTAL) BY MOUTH BEFORE BREAKFAST. 30 tablet 10    TRUE METRIX GLUCOSE TEST STRIP Strp 1 STRIP BY [facility]NON-DRUG COMBO ROUTE) ROUTE 2 (TWO) TIMES DAILY. 100 strip 10    zinc gluconate 50 mg tablet Take 50 mg by mouth once daily.      ondansetron (ZOFRAN-ODT) 4 MG TbDL Take 1 tablet (4 mg total) by mouth every 8 (eight) hours as needed (nausea). (Patient not taking: Reported on 1/8/2020) 20 tablet 0    pantoprazole (PROTONIX) 20 MG tablet Take 1 tablet (20 mg total) by mouth once daily. (Patient not taking: Reported on 1/8/2020) 30 tablet 0    promethazine (PHENERGAN) 25 MG tablet Take 1 tablet (25 mg total) by mouth every 6 (six) hours as needed for Nausea. (Patient not taking: Reported on 1/8/2020) 12 tablet 0     No current facility-administered medications on file prior to visit.        Vital signs:  /78   Pulse 99   Ht 5' 6" (1.676 m)   Wt 90.7 kg (199 lb 15.3 oz)   LMP  (LMP Unknown) Comment:   2006  BMI 32.27 kg/m²     Physical Exam   Constitutional: She is oriented to person, place, and time. She appears well-developed and well-nourished. No distress.   HENT:   Mouth/Throat: Oropharynx is clear and moist.   Eyes: " No scleral icterus.   Cardiovascular: Normal rate and regular rhythm.   Pulmonary/Chest: Effort normal and breath sounds normal.   Abdominal: Soft. Bowel sounds are normal. She exhibits no distension. There is no tenderness. There is no guarding.   Musculoskeletal: She exhibits no edema or deformity.   Neurological: She is alert and oriented to person, place, and time.   Skin: Skin is warm and dry.   Psychiatric: She has a normal mood and affect.   Vitals reviewed.      Routine labs:  Lab Results   Component Value Date    WBC 9.64 01/01/2020    HGB 14.3 01/01/2020    HCT 44.9 01/01/2020    MCV 91 01/01/2020     01/01/2020     Lab Results   Component Value Date    INR 1.1 10/29/2014     Lab Results   Component Value Date    FERRITIN 113 07/15/2010     Lab Results   Component Value Date     01/01/2020    K 3.5 01/01/2020     01/01/2020    CO2 25 01/01/2020    BUN 9 01/01/2020    CREATININE 0.7 01/01/2020     Lab Results   Component Value Date    ALBUMIN 4.0 01/01/2020    ALT 41 01/01/2020    AST 30 01/01/2020    ALKPHOS 85 01/01/2020    BILITOT 0.5 01/01/2020         Assessment:  Abiola Borjas is a 59 y.o. female with medical history of hypothyroidism, anxiety, hx of cholecystectomy (2006), and hx of sigmoid colectomy in 2014 for recurrent diverticulitis followed by another colon surgery at [facility] for colonic stricture who is here as a new patient for chief complaint of nausea. Her nausea was not associated with abdominal pain, change in bowel habits, or fevers. Our differentials include acute viral illness vs NSAID erosive gastropathy vs NSAID induced ulcer. We do not feel strongly about pursuing an EGD as she has no red flags and she does not want an EGD at this time. Because of this, we won't know for sure if she has a gastric ulcer or erosive gastropathy, but we will treat as such given her daily NSAID use for the past several months and daily wine consumption. With regards to her  surveillance colonoscopy, she would like to get this done by her colorectal surgeon at [facility], and she is due now.     Recommendations:  - stop advil and avoid NSAIDS indefinitely  - patient does not want to take protonix as she is scared of the side effects, we recommend she continue taking Pepcid for the next two weeks  - defer EGD at this point  - schedule colonoscopy at [facility] as she is due now for hx of tubular adenoma in 2014. Does not want to get this done here.   - advised patient to let us know if her symptoms re-cur or if she develops any new symptoms      Samuel Patel MD PGY-VI  Gastroenterology Fellow  [facility]  P [phone]

## 2020-01-28 LAB
C DIPHTHERIAE AB SER IA-ACNC: >2 IU/ML
C TETANI AB SER-ACNC: >5.33 IU/ML
DEPRECATED S PNEUM 1 IGG SER-MCNC: 22.1 MCG/ML
DEPRECATED S PNEUM12 IGG SER-MCNC: >30 MCG/ML
DEPRECATED S PNEUM14 IGG SER-MCNC: 4 MCG/ML
DEPRECATED S PNEUM19 IGG SER-MCNC: >139 MCG/ML
DEPRECATED S PNEUM23 IGG SER-MCNC: 3.1 MCG/ML
DEPRECATED S PNEUM3 IGG SER-MCNC: 5.5 MCG/ML
DEPRECATED S PNEUM4 IGG SER-MCNC: 5.3 MCG/ML
DEPRECATED S PNEUM5 IGG SER-MCNC: 1.5 MCG/ML
DEPRECATED S PNEUM8 IGG SER-MCNC: 5.3 MCG/ML
DEPRECATED S PNEUM9 IGG SER-MCNC: 9.5 MCG/ML
HAEM INFLU B IGG SER-MCNC: 4.26 MCG/ML
S PNEUM DA 18C IGG SER-MCNC: 33.3 MCG/ML
S PNEUM DA 6B IGG SER-MCNC: 1.9 MCG/ML
S PNEUM DA 7F IGG SER-MCNC: 18.8 MCG/ML
S PNEUM DA 9V IGG SER-MCNC: 2 MCG/ML

## 2020-03-04 ENCOUNTER — PATIENT OUTREACH (OUTPATIENT)
Dept: ADMINISTRATIVE | Facility: OTHER | Age: 60
End: 2020-03-04

## 2020-03-05 ENCOUNTER — OFFICE VISIT (OUTPATIENT)
Dept: OBSTETRICS AND GYNECOLOGY | Facility: CLINIC | Age: 60
End: 2020-03-05
Payer: COMMERCIAL

## 2020-03-05 VITALS
HEIGHT: 66 IN | DIASTOLIC BLOOD PRESSURE: 82 MMHG | BODY MASS INDEX: 31.89 KG/M2 | SYSTOLIC BLOOD PRESSURE: 118 MMHG | WEIGHT: 198.44 LBS

## 2020-03-05 DIAGNOSIS — Z78.0 MENOPAUSE: ICD-10-CM

## 2020-03-05 DIAGNOSIS — Z12.31 BREAST CANCER SCREENING BY MAMMOGRAM: ICD-10-CM

## 2020-03-05 DIAGNOSIS — Z12.31 VISIT FOR SCREENING MAMMOGRAM: ICD-10-CM

## 2020-03-05 DIAGNOSIS — Z01.419 ENCOUNTER FOR GYNECOLOGICAL EXAMINATION: Primary | ICD-10-CM

## 2020-03-05 DIAGNOSIS — N39.46 MIXED STRESS AND URGE URINARY INCONTINENCE: ICD-10-CM

## 2020-03-05 PROCEDURE — 99396 PR PREVENTIVE VISIT,EST,40-64: ICD-10-PCS | Mod: S$GLB,,, | Performed by: OBSTETRICS & GYNECOLOGY

## 2020-03-05 PROCEDURE — 99999 PR PBB SHADOW E&M-EST. PATIENT-LVL IV: ICD-10-PCS | Mod: PBBFAC,,, | Performed by: OBSTETRICS & GYNECOLOGY

## 2020-03-05 PROCEDURE — 99999 PR PBB SHADOW E&M-EST. PATIENT-LVL IV: CPT | Mod: PBBFAC,,, | Performed by: OBSTETRICS & GYNECOLOGY

## 2020-03-05 PROCEDURE — 99396 PREV VISIT EST AGE 40-64: CPT | Mod: S$GLB,,, | Performed by: OBSTETRICS & GYNECOLOGY

## 2020-03-05 RX ORDER — MAGNESIUM 30 MG
TABLET ORAL ONCE
COMMUNITY

## 2020-03-05 RX ORDER — CHOLECALCIFEROL (VITAMIN D3) 25 MCG
1000 TABLET ORAL DAILY
COMMUNITY

## 2020-03-05 RX ORDER — FAMOTIDINE 10 MG/1
10 TABLET ORAL 2 TIMES DAILY PRN
COMMUNITY
End: 2024-02-26

## 2020-03-05 RX ORDER — ESTRADIOL AND NORETHINDRONE ACETATE .5; .1 MG/1; MG/1
1 TABLET ORAL DAILY
Qty: 84 TABLET | Refills: 3 | Status: CANCELLED | OUTPATIENT
Start: 2020-03-05

## 2020-03-05 NOTE — PROGRESS NOTES
Subjective:       Patient ID: Abiola Borjas is a 59 y.o. female.    Chief Complaint:  Well Woman (Annual  --  last pap/hpv 10-26-17, Negative  --  last mmg 19, birads 1  --  colonoscopy , stricture, repeat )      History of Present Illness.  Abiola Borjas is a 59 y.o. female.  She has no breast or urinary symptoms.  She has no postcoital bleeding, pelvic pain or vaginal discharge.  She complains of leakage of urine with valsalva and urgency daily.  She also has fecal incontinence and has had 2 bowel surgeries so wears a pad daily.    GYN & OB History  No LMP recorded (lmp unknown). Patient is postmenopausal.   Pap: 10/31/2017 Normal HPV negative  Mammogram: 19 Normal  Colonoscopy: 9/15/15 stricture  DEXA:  Normal  PCP:  Dr. Contreras  Routine Labs:   2020    OB History    Para Term  AB Living   3 3 3     3   SAB TAB Ectopic Multiple Live Births           3      # Outcome Date GA Lbr Mark/2nd Weight Sex Delivery Anes PTL Lv   3 Term  39w0d   F CS-LTranv   PAMELA      Complications: Fetal Intolerance, Previous  section   2 Term  39w0d   F CS-LTranv   PAMELA      Complications: Breech presentation   1 Term  42w0d   F CS-LTranv   PAMELA      Complications: Failure to Progress in Second Stage, Decreased heart rate      Obstetric Comments   Menarche ~ 11       Past Medical History:   Diagnosis Date    Anxiety     Colon polyps     Deviated septum     Diverticulosis of sigmoid colon     Focal nodular hyperplasia of liver     H/O bone density study 2014    Normal  @ [facility]    Hormone replacement therapy (HRT)     Hypoglycemia     Hypothyroidism     Menopause 2006    PONV (postoperative nausea and vomiting)     Snores     Thyroid disease      Past Surgical History:   Procedure Laterality Date    ABDOMINAL HERNIA REPAIR       SECTION      CHOLECYSTECTOMY      COLECTOMY  2015    MD Rousseau for stricture    COLON SURGERY  2015    MD Rousseau  for Stricture     COLONOSCOPY  09/2015    Stricture     HERNIA REPAIR       Family History   Problem Relation Age of Onset    Diabetes Mother     Hypertension Mother     Kidney cancer Father     Depression Sister     Breast cancer Paternal Aunt 70    Prostate cancer Other     Leukemia Other         Medicine induced from chemo    Colon cancer Neg Hx     Miscarriages / Stillbirths Neg Hx      Social History     Tobacco Use    Smoking status: Never Smoker    Smokeless tobacco: Never Used   Substance Use Topics    Alcohol use: Yes     Alcohol/week: 0.0 standard drinks     Comment: daily ETOH, wine daily, last use Friday    Drug use: No       Current Outpatient Medications:     AFLURIA QD 2019-20,3YR UP,,PF, 60 mcg (15 mcg x 4)/0.5 mL Syrg, ADM 0.5ML IM UTD, Disp: , Rfl:     ALPRAZolam (XANAX) 0.5 MG tablet, Take 0.5 mg by mouth 5 (five) times daily., Disp: , Rfl:     CYTOMEL 25 mcg Tab, TAKE 1/2 TABLET BY MOUTH EVERY EVENING, Disp: 45 tablet, Rfl: 3    diphenhydrAMINE (BENADRYL) 25 mg capsule, Take 12.5 mg by mouth., Disp: , Rfl:     estradiol-norethindrone acet 0.5-0.1 mg per tablet, TAKE 1 TABLET BY MOUTH ONCE DAILY. PLEASE KEEP ANNUAL APPOINTMENT FOR ADDITIONAL REFILLS, Disp: 84 tablet, Rfl: 0    famotidine (PEPCID AC) 10 MG tablet, Take 10 mg by mouth 2 (two) times daily., Disp: , Rfl:     fluticasone (FLONASE) 50 mcg/actuation nasal spray, 2 sprays by Nasal route., Disp: , Rfl:     Lactobacillus rhamnosus GG (CULTURELLE) 10 billion cell capsule, Take 1 capsule by mouth once daily., Disp: , Rfl:     magnesium 30 mg Tab, Take by mouth once., Disp: , Rfl:     melatonin 3 mg Tab, Take 5 mg by mouth., Disp: , Rfl:     multivitamin capsule, Take 1 capsule by mouth once daily., Disp: , Rfl:     SYNTHROID 88 mcg tablet, TAKE 1 TABLET (88 MCG TOTAL) BY MOUTH BEFORE BREAKFAST., Disp: 30 tablet, Rfl: 10    vitamin A/vitamin D2/cod liver (VITAMINS A & D ORAL), Take by mouth., Disp: , Rfl:      "vitamin C-multivitamin-mineral (EMERGEN-C) 500 mg Chew, Take by mouth., Disp: , Rfl:     vitamin D (VITAMIN D3) 1000 units Tab, Take 1,000 Units by mouth once daily., Disp: , Rfl:     zinc gluconate 50 mg tablet, Take 50 mg by mouth once daily., Disp: , Rfl:     estradiol-progesterone (BIJUVA) 1-100 mg Cap, Take 1 capsule by mouth every evening., Disp: 30 capsule, Rfl: 11    lancets Misc, 1 Device by Misc.(Non-Drug; Combo Route) route 2 (two) times daily., Disp: 100 each, Rfl: 11    TRUE METRIX GLUCOSE TEST STRIP Strp, 1 STRIP BY MISC.(NON-DRUG COMBO ROUTE) ROUTE 2 (TWO) TIMES DAILY., Disp: 100 strip, Rfl: 10    Review of patient's allergies indicates:   Allergen Reactions    Cephalosporins Swelling    Penicillin g Swelling       Review of Systems  Review of Systems   Constitutional: Negative for fatigue.   HENT: Negative for trouble swallowing.    Eyes: Negative for visual disturbance.   Respiratory: Negative for cough and shortness of breath.    Cardiovascular: Negative for chest pain.   Gastrointestinal: Negative for abdominal distention, abdominal pain, blood in stool, nausea and vomiting.   Genitourinary: Negative for difficulty urinating, dyspareunia, dysuria, flank pain, frequency, hematuria, pelvic pain, urgency, vaginal bleeding, vaginal discharge and vaginal pain.   Musculoskeletal: Negative for arthralgias.   Skin: Negative for rash.   Neurological: Negative for dizziness and headaches.   Psychiatric/Behavioral: Negative for sleep disturbance. The patient is not nervous/anxious.         Objective:     Vitals:    03/05/20 1322   BP: 118/82   Weight: 90 kg (198 lb 6.6 oz)   Height: 5' 6" (1.676 m)   PainSc: 0-No pain     Body mass index is 32.02 kg/m².    Physical Exam:   Constitutional: She is oriented to person, place, and time. Vital signs are normal. She appears well-developed and well-nourished.    HENT:   Head: Normocephalic.     Neck: Normal range of motion. No thyromegaly present.   "   Pulmonary/Chest: Right breast exhibits no mass, no nipple discharge, no skin change, no tenderness and no swelling. Left breast exhibits no mass, no nipple discharge, no skin change, no tenderness and no swelling. Breasts are symmetrical.        Abdominal: Soft. Normal appearance and bowel sounds are normal. She exhibits no distension. There is no tenderness.     Genitourinary: Vagina normal and uterus normal. Pelvic exam was performed with patient supine. There is no rash, tenderness, lesion or injury on the right labia. There is no rash, tenderness, lesion or injury on the left labia. Cervix is normal. Right adnexum displays no mass, no tenderness and no fullness. Left adnexum displays no mass, no tenderness and no fullness. No erythema in the vagina. No vaginal discharge found. Cervix exhibits no motion tenderness and no discharge.           Musculoskeletal: Normal range of motion.      Lymphadenopathy:        Right: No inguinal and no supraclavicular adenopathy present.        Left: No inguinal and no supraclavicular adenopathy present.    Neurological: She is alert and oriented to person, place, and time.    Skin: Skin is warm and dry.    Psychiatric: She has a normal mood and affect.        Assessment/ Plan:     Encounter for gynecological examination    Breast cancer screening by mammogram  -     Mammo Digital Screening Bilat w/ Jamaal; Future; Expected date: 03/05/2020    Menopause  -     estradiol-progesterone (BIJUVA) 1-100 mg Cap; Take 1 capsule by mouth every evening.  Dispense: 30 capsule; Refill: 11  -     DXA Bone Density Spine And Hip; Future; Expected date: 03/05/2020    Mixed stress and urge urinary incontinence  -     Ambulatory referral/consult to Urogynecology; Future; Expected date: 03/12/2020    Visit for screening mammogram        Routine pap smears.  Self breast exam and mammography discussed  Routine colonoscopy discussed.  Diet and exercise discussed.  Recommend calcium 1200 mg and vitamin  D 600 units daily and routine bone mineral density testing.  Yearly influenza vaccination discussed.  Follow-up with me in 3 months

## 2020-03-09 DIAGNOSIS — Z78.0 MENOPAUSE: ICD-10-CM

## 2020-03-09 RX ORDER — ESTRADIOL AND NORETHINDRONE ACETATE .5; .1 MG/1; MG/1
1 TABLET ORAL DAILY
Qty: 84 TABLET | Refills: 0 | OUTPATIENT
Start: 2020-03-09

## 2020-03-17 ENCOUNTER — TELEPHONE (OUTPATIENT)
Dept: OBSTETRICS AND GYNECOLOGY | Facility: CLINIC | Age: 60
End: 2020-03-17

## 2020-03-17 DIAGNOSIS — Z78.0 MENOPAUSE: Primary | ICD-10-CM

## 2020-03-17 RX ORDER — ESTRADIOL AND NORETHINDRONE ACETATE .5; .1 MG/1; MG/1
1 TABLET ORAL DAILY
Qty: 90 TABLET | Refills: 3 | Status: SHIPPED | OUTPATIENT
Start: 2020-03-17 | End: 2021-02-14

## 2020-03-17 RX ORDER — LEVOTHYROXINE SODIUM 88 UG/1
88 TABLET ORAL
Qty: 90 TABLET | Refills: 3 | Status: SHIPPED | OUTPATIENT
Start: 2020-03-17 | End: 2021-04-29 | Stop reason: SDUPTHER

## 2020-03-17 NOTE — TELEPHONE ENCOUNTER
Spoke with pt.  She wants to take Activella because afraid to change right now.  Aware of BRCA risk.  Rx sent

## 2020-05-20 ENCOUNTER — HOSPITAL ENCOUNTER (OUTPATIENT)
Dept: RADIOLOGY | Facility: HOSPITAL | Age: 60
Discharge: HOME | End: 2020-05-20
Attending: OBSTETRICS & GYNECOLOGY
Payer: COMMERCIAL

## 2020-05-20 DIAGNOSIS — Z12.31 BREAST CANCER SCREENING BY MAMMOGRAM: ICD-10-CM

## 2020-05-20 PROCEDURE — 77063 MAMMO DIGITAL SCREENING BILAT WITH TOMOSYNTHESIS_CAD: ICD-10-PCS | Mod: 26,,, | Performed by: RADIOLOGY

## 2020-05-20 PROCEDURE — 77067 SCR MAMMO BI INCL CAD: CPT | Mod: TC

## 2020-05-20 PROCEDURE — 77067 MAMMO DIGITAL SCREENING BILAT WITH TOMOSYNTHESIS_CAD: ICD-10-PCS | Mod: 26,,, | Performed by: RADIOLOGY

## 2020-05-20 PROCEDURE — 77063 BREAST TOMOSYNTHESIS BI: CPT | Mod: 26,,, | Performed by: RADIOLOGY

## 2020-05-20 PROCEDURE — 77067 SCR MAMMO BI INCL CAD: CPT | Mod: 26,,, | Performed by: RADIOLOGY

## 2020-11-05 ENCOUNTER — PATIENT MESSAGE (OUTPATIENT)
Dept: INTERNAL MEDICINE | Facility: CLINIC | Age: 60
End: 2020-11-05

## 2020-11-05 RX ORDER — BUPROPION HYDROCHLORIDE 75 MG/1
TABLET ORAL
COMMUNITY
Start: 2020-10-08 | End: 2021-05-11

## 2020-11-05 RX ORDER — LIOTHYRONINE SODIUM 25 UG/1
TABLET ORAL
Qty: 45 TABLET | Refills: 0 | Status: CANCELLED | OUTPATIENT
Start: 2020-11-05

## 2020-11-12 ENCOUNTER — OFFICE VISIT (OUTPATIENT)
Dept: URGENT CARE | Facility: CLINIC | Age: 60
End: 2020-11-12
Payer: COMMERCIAL

## 2020-11-12 VITALS
HEIGHT: 66 IN | BODY MASS INDEX: 31.82 KG/M2 | DIASTOLIC BLOOD PRESSURE: 84 MMHG | TEMPERATURE: 98 F | HEART RATE: 83 BPM | WEIGHT: 198 LBS | RESPIRATION RATE: 16 BRPM | OXYGEN SATURATION: 97 % | SYSTOLIC BLOOD PRESSURE: 156 MMHG

## 2020-11-12 DIAGNOSIS — J01.10 ACUTE NON-RECURRENT FRONTAL SINUSITIS: ICD-10-CM

## 2020-11-12 DIAGNOSIS — B34.9 VIRAL SYNDROME: Primary | ICD-10-CM

## 2020-11-12 LAB
CTP QC/QA: YES
SARS-COV-2 RDRP RESP QL NAA+PROBE: NEGATIVE

## 2020-11-12 PROCEDURE — 3008F BODY MASS INDEX DOCD: CPT | Mod: CPTII,S$GLB,, | Performed by: STUDENT IN AN ORGANIZED HEALTH CARE EDUCATION/TRAINING PROGRAM

## 2020-11-12 PROCEDURE — U0002: ICD-10-PCS | Mod: QW,S$GLB,, | Performed by: STUDENT IN AN ORGANIZED HEALTH CARE EDUCATION/TRAINING PROGRAM

## 2020-11-12 PROCEDURE — 99213 PR OFFICE/OUTPT VISIT, EST, LEVL III, 20-29 MIN: ICD-10-PCS | Mod: S$GLB,,, | Performed by: STUDENT IN AN ORGANIZED HEALTH CARE EDUCATION/TRAINING PROGRAM

## 2020-11-12 PROCEDURE — 3008F PR BODY MASS INDEX (BMI) DOCUMENTED: ICD-10-PCS | Mod: CPTII,S$GLB,, | Performed by: STUDENT IN AN ORGANIZED HEALTH CARE EDUCATION/TRAINING PROGRAM

## 2020-11-12 PROCEDURE — 99213 OFFICE O/P EST LOW 20 MIN: CPT | Mod: S$GLB,,, | Performed by: STUDENT IN AN ORGANIZED HEALTH CARE EDUCATION/TRAINING PROGRAM

## 2020-11-12 PROCEDURE — U0002 COVID-19 LAB TEST NON-CDC: HCPCS | Mod: QW,S$GLB,, | Performed by: STUDENT IN AN ORGANIZED HEALTH CARE EDUCATION/TRAINING PROGRAM

## 2020-11-13 ENCOUNTER — OFFICE VISIT (OUTPATIENT)
Dept: INTERNAL MEDICINE | Facility: CLINIC | Age: 60
End: 2020-11-13
Payer: COMMERCIAL

## 2020-11-13 VITALS
HEIGHT: 66 IN | DIASTOLIC BLOOD PRESSURE: 76 MMHG | WEIGHT: 194 LBS | SYSTOLIC BLOOD PRESSURE: 130 MMHG | BODY MASS INDEX: 31.18 KG/M2 | HEART RATE: 70 BPM | OXYGEN SATURATION: 96 %

## 2020-11-13 DIAGNOSIS — J32.9 SINUSITIS, UNSPECIFIED CHRONICITY, UNSPECIFIED LOCATION: Primary | ICD-10-CM

## 2020-11-13 PROCEDURE — 99999 PR PBB SHADOW E&M-EST. PATIENT-LVL III: CPT | Mod: PBBFAC,,, | Performed by: INTERNAL MEDICINE

## 2020-11-13 PROCEDURE — 1126F AMNT PAIN NOTED NONE PRSNT: CPT | Mod: S$GLB,,, | Performed by: INTERNAL MEDICINE

## 2020-11-13 PROCEDURE — 1126F PR PAIN SEVERITY QUANTIFIED, NO PAIN PRESENT: ICD-10-PCS | Mod: S$GLB,,, | Performed by: INTERNAL MEDICINE

## 2020-11-13 PROCEDURE — 99213 PR OFFICE/OUTPT VISIT, EST, LEVL III, 20-29 MIN: ICD-10-PCS | Mod: S$GLB,,, | Performed by: INTERNAL MEDICINE

## 2020-11-13 PROCEDURE — 3008F BODY MASS INDEX DOCD: CPT | Mod: CPTII,S$GLB,, | Performed by: INTERNAL MEDICINE

## 2020-11-13 PROCEDURE — 3008F PR BODY MASS INDEX (BMI) DOCUMENTED: ICD-10-PCS | Mod: CPTII,S$GLB,, | Performed by: INTERNAL MEDICINE

## 2020-11-13 PROCEDURE — 99213 OFFICE O/P EST LOW 20 MIN: CPT | Mod: S$GLB,,, | Performed by: INTERNAL MEDICINE

## 2020-11-13 PROCEDURE — 99999 PR PBB SHADOW E&M-EST. PATIENT-LVL III: ICD-10-PCS | Mod: PBBFAC,,, | Performed by: INTERNAL MEDICINE

## 2020-11-13 RX ORDER — AZITHROMYCIN 250 MG/1
TABLET, FILM COATED ORAL
Qty: 6 TABLET | Refills: 0 | Status: SHIPPED | OUTPATIENT
Start: 2020-11-13 | End: 2021-05-11

## 2020-11-13 NOTE — PROGRESS NOTES
"Subjective:       Patient ID: Abiola Borjas is a 60 y.o. female.    Vitals:  height is 5' 6" (1.676 m) and weight is 89.8 kg (198 lb). Her tympanic temperature is 97.7 °F (36.5 °C). Her blood pressure is 156/84 (abnormal) and her pulse is 83. Her respiration is 16 and oxygen saturation is 97%.     Chief Complaint: Diarrhea, Sore Throat, and Generalized Body Aches    Pt presents for COVID concerns. Reports sinus congestion and sore throat that started ~Monday and then progressed to include myalgias and x3 episodes of watery diarrhea this AM that has since resolved. Endorses associated intermittent night time cough. No known COVID contacts. Denied f/c, GI sx's, SoB, wheezing, or chest tightness.    Diarrhea   This is a new problem. The current episode started today. The problem occurs 2 to 4 times per day. The problem has been rapidly improving. The stool consistency is described as watery. The patient states that diarrhea does not awaken her from sleep. Associated symptoms include coughing, myalgias and a URI. Pertinent negatives include no abdominal pain, arthralgias, chills, fever, headaches or vomiting. Nothing aggravates the symptoms. There are no known risk factors. She has tried nothing for the symptoms. The treatment provided moderate relief.   Sore Throat   This is a new problem. Episode onset: 4 days. The problem has been waxing and waning. Neither side of throat is experiencing more pain than the other. There has been no fever. The pain is at a severity of 2/10. Associated symptoms include congestion, coughing and diarrhea. Pertinent negatives include no abdominal pain, ear pain, headaches, neck pain, shortness of breath, stridor, trouble swallowing or vomiting. She has had no exposure to strep or mono. She has tried acetaminophen (advil last dose was last night) for the symptoms.       Constitution: Positive for fatigue. Negative for chills, sweating and fever.   HENT: Positive for congestion, " postnasal drip, sinus pain, sinus pressure and sore throat. Negative for ear pain, trouble swallowing and voice change.    Neck: Negative for neck pain and painful lymph nodes.   Cardiovascular: Negative for chest pain, palpitations and sob on exertion.   Eyes: Negative for eye discharge, eye itching and eye redness.   Respiratory: Positive for cough. Negative for chest tightness, sputum production, bloody sputum, shortness of breath, stridor and wheezing.    Gastrointestinal: Positive for diarrhea. Negative for abdominal pain, nausea and vomiting.   Musculoskeletal: Positive for muscle ache. Negative for joint pain and joint swelling.   Skin: Negative for color change, rash and lesion.   Allergic/Immunologic: Negative for seasonal allergies.   Neurological: Negative for dizziness, light-headedness and headaches.   Hematologic/Lymphatic: Negative for swollen lymph nodes.   Psychiatric/Behavioral: Negative for confusion, agitation and sleep disturbance.       Objective:      Physical Exam   Constitutional: She appears well-developed. She does not appear ill. No distress.   HENT:   Head: Normocephalic and atraumatic.   Ears:   Right Ear: External ear normal.   Left Ear: External ear normal.   Eyes: Conjunctivae and EOM are normal. Right eye exhibits no discharge. Left eye exhibits no discharge.   Cardiovascular: Normal rate, regular rhythm and normal heart sounds.   Pulmonary/Chest: Effort normal and breath sounds normal. No respiratory distress. She has no wheezes.   Neurological: She is alert.   Vitals reviewed.    Recent Lab Results       11/12/20 2006         Acceptable Yes     SARS-CoV-2 RNA, Amplification, Qual Negative             Assessment:       1. Viral syndrome    2. Acute non-recurrent frontal sinusitis        Plan:         Viral syndrome  -     POCT COVID-19 Rapid Screening  - discussed negative result with patient. Counseled Symptomatic patient without known exposure to continue home  quarantine/isolation per CDC guidelines in event of false negative rapid COVID test    Acute non-recurrent frontal sinusitis  - counseled on home care and OTC medications    Follow up in 4 days (on 11/16/2020) for further management with PCP, or sooner if worsening symptoms.    Elver Alexander MD/MPH  [facility]  [facility]

## 2020-11-13 NOTE — PROGRESS NOTES
MEDICAL HISTORY:  Hypothyroid.  Anxiety disorder with probable depression.  Fatty liver disease with nodular hyperplasia.     CURRENT MEDICATIONS:  Xanax 0.5 mg once a day as needed.  Cytomel 25 mcg.  Pantoprazole 20 mg, which was prescribed.  Synthroid 0.088 mg.  Estradiol-norethindrone hormone medication.      60-year-old female  For 5 days she has been feeling congested in the head although she is not blowing out any phlegm or mucus  He is feel little bit full in throat is a little bit sore scratchy  No fever chills with this  Last night went urgent care way COVID-19 test was negative  She has had a history of allergic rhinitis and sinusitis in the past    She has been taking Flonase    Examination  Weight 194  Pulse 72  Blood pressure 132/76  Pulse ox 96%  Tympanic membrane slices effusion right ear  Nasal mucosa is minimally swollen  Oropharynx slightly hyperemic  Neck no adenopathy  Chest clear breath sounds  Heart regular rate and rhythm    Impression  Sinusitis     Plan  Zithromax Z-Rito and use of Mucinex  Discuss course of steroids a steroid shot but she defers on this  She has a follow-up visit few days

## 2020-11-13 NOTE — PATIENT INSTRUCTIONS
"You may leave home and/or return to work when the following conditions are met:   24 hours fever free without fever-reducing medications AND   Improved symptoms   You have not met the conditions of a close exposure       A "close exposure" is defined as anyone who has had an exposure (masked or unmasked) to a known COVID -19 positive person within 6 ft for longer than 15 minutes. If your exposure meets this definition you are required by CDC guidelines to quarantine for 14 days from time of exposure regardless of test status.      Additional instructions:  · Social distance per your local guidelines  · Call ahead before visiting your doctor.  · Wear a facemask when around others who do not live in your household.  · Cover your coughs and sneezes.  · Wash your hands often with soap and water; hand  can be used, too.      Acute Sinusitis    Acute sinusitis is irritation and swelling of the sinuses. It is usually caused by a viral infection after a common cold. Your doctor can help you find relief.  What is acute sinusitis?  Sinuses are air-filled spaces in the skull behind the face. They are kept moist and clean by a lining of mucosa. Things such as pollen, smoke, and chemical fumes can irritate the mucosa. It can then swell up. As a response to irritation, the mucosa makes more mucus and other fluids. Tiny hairlike cilia cover the mucosa. Cilia help carry mucus toward the opening of the sinus. Too much mucus may cause the cilia to stop working. This blocks the sinus opening. A buildup of fluid in the sinuses then causes pain and pressure. It can also encourage bacteria to grow in the sinuses.  Common symptoms of acute sinusitis  You may have:  · Facial soreness pain  · Headache  · Fever  · Fluid draining in the back of the throat (postnasal drip)  · Congestion  · Drainage that is thick and colored, instead of clear  · Cough  Diagnosing acute sinusitis  Your doctor will ask about your symptoms and health " history. He or she will look at your ear, nose, and throat. You usually won't need to have X-rays taken.    The doctor may take a sample of mucus to check for bacteria. If you have sinusitis that keeps coming back, you may need imaging tests such as X-rays or CAT scans. This will help your doctor check for a structural problem that may be causing the infection.  Treating acute sinusitis  Treatment is aimed at unblocking the sinus opening and helping the cilia work again. You may need to take antihistamine and decongestant medicine. These can reduce inflammation and decrease the amount of fluid your sinuses make. If you have a bacterial infection, you will need to take antibiotic medicine for 10 to 14 days. Take this medicine until it is gone, even if you feel better.  Date Last Reviewed: 10/1/2016  © 9537-9907 [employer]. [address] [place] [address], [place] [address]. All rights reserved. This information is not intended as a substitute for professional medical care. Always follow your healthcare professional's instructions.

## 2020-11-14 NOTE — PROGRESS NOTES
MEDICAL HISTORY:  Hypothyroid disease.  Fatty liver with focal nodular hyperplasia.  Anxiety disorder.  Cholecystectomy with pancreatitis.   x3.  Abdominal hernia repair.  Diverticula disease of the colon, for which she had in , a sigmoid colectomy due to diverticulitis, then a stent put in a month later because of   microperforation. In 2015, she underwent a balloon dilatation due to stenosis of the colonic anastomosis.     SOCIAL HISTORY:  Tobacco use and alcohol use -- none.  , has three children.  Exercises by walking.      FAMILY HISTORY:  Her father is , kidney cancer.  Mother is still living, diabetes.  Two sisters, one with depression.      60-year-old female    This appointment was set up as an annual visit    She was seen 4 days ago for sinusitis and is currently on Zithromax Z-Rito and peers to be responding well which she does not feel as congested in her head and not having a headache    Two days ago, after taking Mucinex fax X which contains phenol fed drink, high Exelonix watch recorded a fast heart rate in the 150s which made her very nervous.  She did feel anything internally as for his chest pain or palpitations, later put a pulse ox in which her heart rate was reportedly normal    Review of symptoms    Negative for chest pain, palpitations, shortness of breath, abdominal pain  Urination can be frequent in urgent but she drinks water regularly throughout the day  She has regular bowel function and at times is urgent  Occasional heartburn indigestion but is been awhile since she is taking Pepcid  No chronic headaches  Tends to drink tonic water at night to prevent leg cramps    Examination  Weight 194 lb  Pulse 88  Blood pressure 125/70  HEENT exam no abnormal findings  Neck no thyromegaly no masses  Chest clear breath sounds  Heart regular rate rhythm  Abdominal exam active bowel sounds soft nontender no hepatosplenomegaly abdominal masses  Pulses 2+ carotid pulses  no bruits 2+ pedal pulses  Extremities no edema  Lymph gland palpable adenopathy    Impression  General examination  Hypothyroid  Anxiety disorder  Episode of tachycardia  Diverticular disease of colon    Plan  ECG  Labs of chemistry, lipid, CBC, TSH, free T4 free T3  Magnesium, vitamin-D  Fit test  Attention to diet physical activity

## 2020-11-16 ENCOUNTER — LAB VISIT (OUTPATIENT)
Dept: LAB | Facility: HOSPITAL | Age: 60
End: 2020-11-16
Attending: INTERNAL MEDICINE
Payer: COMMERCIAL

## 2020-11-16 ENCOUNTER — OFFICE VISIT (OUTPATIENT)
Dept: INTERNAL MEDICINE | Facility: CLINIC | Age: 60
End: 2020-11-16
Payer: COMMERCIAL

## 2020-11-16 VITALS
HEART RATE: 88 BPM | WEIGHT: 194 LBS | DIASTOLIC BLOOD PRESSURE: 70 MMHG | BODY MASS INDEX: 31.31 KG/M2 | SYSTOLIC BLOOD PRESSURE: 124 MMHG | OXYGEN SATURATION: 99 %

## 2020-11-16 DIAGNOSIS — Z00.00 ANNUAL PHYSICAL EXAM: ICD-10-CM

## 2020-11-16 DIAGNOSIS — F41.9 ANXIETY DISORDER, UNSPECIFIED TYPE: ICD-10-CM

## 2020-11-16 DIAGNOSIS — Z12.11 COLON CANCER SCREENING: ICD-10-CM

## 2020-11-16 DIAGNOSIS — E03.9 HYPOTHYROIDISM, UNSPECIFIED TYPE: ICD-10-CM

## 2020-11-16 DIAGNOSIS — Z11.59 ENCOUNTER FOR HEPATITIS C SCREENING TEST FOR LOW RISK PATIENT: ICD-10-CM

## 2020-11-16 DIAGNOSIS — Z00.00 ANNUAL PHYSICAL EXAM: Primary | ICD-10-CM

## 2020-11-16 DIAGNOSIS — R00.0 TACHYCARDIA: ICD-10-CM

## 2020-11-16 LAB
BASOPHILS # BLD AUTO: 0.03 K/UL (ref 0–0.2)
BASOPHILS NFR BLD: 0.4 % (ref 0–1.9)
DIFFERENTIAL METHOD: ABNORMAL
EOSINOPHIL # BLD AUTO: 0 K/UL (ref 0–0.5)
EOSINOPHIL NFR BLD: 0.3 % (ref 0–8)
ERYTHROCYTE [DISTWIDTH] IN BLOOD BY AUTOMATED COUNT: 12.8 % (ref 11.5–14.5)
HCT VFR BLD AUTO: 43.9 % (ref 37–48.5)
HGB BLD-MCNC: 13.7 G/DL (ref 12–16)
IMM GRANULOCYTES # BLD AUTO: 0.02 K/UL (ref 0–0.04)
IMM GRANULOCYTES NFR BLD AUTO: 0.3 % (ref 0–0.5)
LYMPHOCYTES # BLD AUTO: 1.9 K/UL (ref 1–4.8)
LYMPHOCYTES NFR BLD: 27.1 % (ref 18–48)
MCH RBC QN AUTO: 29.3 PG (ref 27–31)
MCHC RBC AUTO-ENTMCNC: 31.2 G/DL (ref 32–36)
MCV RBC AUTO: 94 FL (ref 82–98)
MONOCYTES # BLD AUTO: 0.4 K/UL (ref 0.3–1)
MONOCYTES NFR BLD: 5 % (ref 4–15)
NEUTROPHILS # BLD AUTO: 4.8 K/UL (ref 1.8–7.7)
NEUTROPHILS NFR BLD: 66.9 % (ref 38–73)
NRBC BLD-RTO: 0 /100 WBC
PLATELET # BLD AUTO: 270 K/UL (ref 150–350)
PMV BLD AUTO: 10.3 FL (ref 9.2–12.9)
RBC # BLD AUTO: 4.67 M/UL (ref 4–5.4)
WBC # BLD AUTO: 7.15 K/UL (ref 3.9–12.7)

## 2020-11-16 PROCEDURE — 82306 VITAMIN D 25 HYDROXY: CPT

## 2020-11-16 PROCEDURE — 80053 COMPREHEN METABOLIC PANEL: CPT

## 2020-11-16 PROCEDURE — 36415 COLL VENOUS BLD VENIPUNCTURE: CPT

## 2020-11-16 PROCEDURE — 93005 EKG 12-LEAD: ICD-10-PCS | Mod: S$GLB,,, | Performed by: INTERNAL MEDICINE

## 2020-11-16 PROCEDURE — 93010 ELECTROCARDIOGRAM REPORT: CPT | Mod: S$GLB,,, | Performed by: INTERNAL MEDICINE

## 2020-11-16 PROCEDURE — 99396 PREV VISIT EST AGE 40-64: CPT | Mod: S$GLB,,, | Performed by: INTERNAL MEDICINE

## 2020-11-16 PROCEDURE — 1126F AMNT PAIN NOTED NONE PRSNT: CPT | Mod: S$GLB,,, | Performed by: INTERNAL MEDICINE

## 2020-11-16 PROCEDURE — 99396 PR PREVENTIVE VISIT,EST,40-64: ICD-10-PCS | Mod: S$GLB,,, | Performed by: INTERNAL MEDICINE

## 2020-11-16 PROCEDURE — 99999 PR PBB SHADOW E&M-EST. PATIENT-LVL III: ICD-10-PCS | Mod: PBBFAC,,, | Performed by: INTERNAL MEDICINE

## 2020-11-16 PROCEDURE — 3008F BODY MASS INDEX DOCD: CPT | Mod: CPTII,S$GLB,, | Performed by: INTERNAL MEDICINE

## 2020-11-16 PROCEDURE — 1126F PR PAIN SEVERITY QUANTIFIED, NO PAIN PRESENT: ICD-10-PCS | Mod: S$GLB,,, | Performed by: INTERNAL MEDICINE

## 2020-11-16 PROCEDURE — 85025 COMPLETE CBC W/AUTO DIFF WBC: CPT

## 2020-11-16 PROCEDURE — 86803 HEPATITIS C AB TEST: CPT

## 2020-11-16 PROCEDURE — 93005 ELECTROCARDIOGRAM TRACING: CPT | Mod: S$GLB,,, | Performed by: INTERNAL MEDICINE

## 2020-11-16 PROCEDURE — 80061 LIPID PANEL: CPT

## 2020-11-16 PROCEDURE — 83735 ASSAY OF MAGNESIUM: CPT

## 2020-11-16 PROCEDURE — 3008F PR BODY MASS INDEX (BMI) DOCUMENTED: ICD-10-PCS | Mod: CPTII,S$GLB,, | Performed by: INTERNAL MEDICINE

## 2020-11-16 PROCEDURE — 84443 ASSAY THYROID STIM HORMONE: CPT

## 2020-11-16 PROCEDURE — 93010 EKG 12-LEAD: ICD-10-PCS | Mod: S$GLB,,, | Performed by: INTERNAL MEDICINE

## 2020-11-16 PROCEDURE — 99999 PR PBB SHADOW E&M-EST. PATIENT-LVL III: CPT | Mod: PBBFAC,,, | Performed by: INTERNAL MEDICINE

## 2020-11-16 PROCEDURE — 84481 FREE ASSAY (FT-3): CPT

## 2020-11-16 PROCEDURE — 84439 ASSAY OF FREE THYROXINE: CPT

## 2020-11-17 LAB
25(OH)D3+25(OH)D2 SERPL-MCNC: 71 NG/ML (ref 30–96)
ALBUMIN SERPL BCP-MCNC: 4.2 G/DL (ref 3.5–5.2)
ALP SERPL-CCNC: 84 U/L (ref 55–135)
ALT SERPL W/O P-5'-P-CCNC: 49 U/L (ref 10–44)
ANION GAP SERPL CALC-SCNC: 11 MMOL/L (ref 8–16)
AST SERPL-CCNC: 32 U/L (ref 10–40)
BILIRUB SERPL-MCNC: 0.4 MG/DL (ref 0.1–1)
BUN SERPL-MCNC: 11 MG/DL (ref 8–23)
CALCIUM SERPL-MCNC: 9.1 MG/DL (ref 8.7–10.5)
CHLORIDE SERPL-SCNC: 103 MMOL/L (ref 95–110)
CHOLEST SERPL-MCNC: 172 MG/DL (ref 120–199)
CHOLEST/HDLC SERPL: 4.1 {RATIO} (ref 2–5)
CO2 SERPL-SCNC: 26 MMOL/L (ref 23–29)
CREAT SERPL-MCNC: 0.8 MG/DL (ref 0.5–1.4)
EST. GFR  (AFRICAN AMERICAN): >60 ML/MIN/1.73 M^2
EST. GFR  (NON AFRICAN AMERICAN): >60 ML/MIN/1.73 M^2
GLUCOSE SERPL-MCNC: 97 MG/DL (ref 70–110)
HCV AB SERPL QL IA: NEGATIVE
HDLC SERPL-MCNC: 42 MG/DL (ref 40–75)
HDLC SERPL: 24.4 % (ref 20–50)
LDLC SERPL CALC-MCNC: 94 MG/DL (ref 63–159)
MAGNESIUM SERPL-MCNC: 2.1 MG/DL (ref 1.6–2.6)
NONHDLC SERPL-MCNC: 130 MG/DL
POTASSIUM SERPL-SCNC: 4.1 MMOL/L (ref 3.5–5.1)
PROT SERPL-MCNC: 7.8 G/DL (ref 6–8.4)
SODIUM SERPL-SCNC: 140 MMOL/L (ref 136–145)
T3FREE SERPL-MCNC: 3.4 PG/ML (ref 2.3–4.2)
T4 FREE SERPL-MCNC: 0.99 NG/DL (ref 0.71–1.51)
TRIGL SERPL-MCNC: 180 MG/DL (ref 30–150)
TSH SERPL DL<=0.005 MIU/L-ACNC: 2.66 UIU/ML (ref 0.4–4)

## 2020-11-18 ENCOUNTER — PATIENT MESSAGE (OUTPATIENT)
Dept: INTERNAL MEDICINE | Facility: CLINIC | Age: 60
End: 2020-11-18

## 2020-11-18 DIAGNOSIS — E03.9 HYPOTHYROIDISM, UNSPECIFIED TYPE: Primary | ICD-10-CM

## 2020-11-18 DIAGNOSIS — E78.1 HYPERTRIGLYCERIDEMIA: ICD-10-CM

## 2020-11-18 DIAGNOSIS — R79.89 ABNORMAL LIVER FUNCTION TEST: ICD-10-CM

## 2020-11-18 NOTE — PROGRESS NOTES
Test results reviewed    All look fine    Triglycerides was 180 and ALT 49    This may be consistent with fatty liver but also she has 2 glasses 1 a day and this was discussed with      Recommend to be mindful with diet and physical activity and repeat labs in 3 months        She is also noticing irregularity in her heartbeat on high apple watch, times it may be be 130 but she physically feels no symptoms    She will try wearing another apple watch it if this is still the case will recommend doing a Holter monitor

## 2020-12-31 ENCOUNTER — CLINICAL SUPPORT (OUTPATIENT)
Dept: URGENT CARE | Facility: CLINIC | Age: 60
End: 2020-12-31
Payer: COMMERCIAL

## 2020-12-31 DIAGNOSIS — Z11.59 ENCOUNTER FOR SCREENING FOR OTHER VIRAL DISEASES: Primary | ICD-10-CM

## 2020-12-31 LAB
CTP QC/QA: YES
SARS-COV-2 RDRP RESP QL NAA+PROBE: NEGATIVE

## 2020-12-31 PROCEDURE — U0002: ICD-10-PCS | Mod: QW,S$GLB,, | Performed by: PHYSICIAN ASSISTANT

## 2020-12-31 PROCEDURE — U0002 COVID-19 LAB TEST NON-CDC: HCPCS | Mod: QW,S$GLB,, | Performed by: PHYSICIAN ASSISTANT

## 2021-01-04 ENCOUNTER — PATIENT MESSAGE (OUTPATIENT)
Dept: ADMINISTRATIVE | Facility: HOSPITAL | Age: 61
End: 2021-01-04

## 2021-01-13 ENCOUNTER — PATIENT MESSAGE (OUTPATIENT)
Dept: INTERNAL MEDICINE | Facility: CLINIC | Age: 61
End: 2021-01-13

## 2021-01-19 ENCOUNTER — PATIENT MESSAGE (OUTPATIENT)
Dept: INTERNAL MEDICINE | Facility: CLINIC | Age: 61
End: 2021-01-19

## 2021-01-20 ENCOUNTER — PATIENT MESSAGE (OUTPATIENT)
Dept: INTERNAL MEDICINE | Facility: CLINIC | Age: 61
End: 2021-01-20

## 2021-01-21 ENCOUNTER — LAB VISIT (OUTPATIENT)
Dept: LAB | Facility: HOSPITAL | Age: 61
End: 2021-01-21
Attending: INTERNAL MEDICINE
Payer: COMMERCIAL

## 2021-01-21 DIAGNOSIS — E03.9 HYPOTHYROIDISM, UNSPECIFIED TYPE: ICD-10-CM

## 2021-01-21 DIAGNOSIS — E78.1 HYPERTRIGLYCERIDEMIA: ICD-10-CM

## 2021-01-21 DIAGNOSIS — R79.89 ABNORMAL LIVER FUNCTION TEST: ICD-10-CM

## 2021-01-21 LAB
ALBUMIN SERPL BCP-MCNC: 4 G/DL (ref 3.5–5.2)
ALP SERPL-CCNC: 76 U/L (ref 55–135)
ALT SERPL W/O P-5'-P-CCNC: 28 U/L (ref 10–44)
AST SERPL-CCNC: 25 U/L (ref 10–40)
BILIRUB DIRECT SERPL-MCNC: 0.2 MG/DL (ref 0.1–0.3)
BILIRUB SERPL-MCNC: 0.5 MG/DL (ref 0.1–1)
CHOLEST SERPL-MCNC: 172 MG/DL (ref 120–199)
CHOLEST/HDLC SERPL: 3.4 {RATIO} (ref 2–5)
HDLC SERPL-MCNC: 51 MG/DL (ref 40–75)
HDLC SERPL: 29.7 % (ref 20–50)
LDLC SERPL CALC-MCNC: 97.4 MG/DL (ref 63–159)
NONHDLC SERPL-MCNC: 121 MG/DL
PROT SERPL-MCNC: 7.5 G/DL (ref 6–8.4)
T3FREE SERPL-MCNC: 4.2 PG/ML (ref 2.3–4.2)
T4 FREE SERPL-MCNC: 1.01 NG/DL (ref 0.71–1.51)
TRIGL SERPL-MCNC: 118 MG/DL (ref 30–150)
TSH SERPL DL<=0.005 MIU/L-ACNC: 4.26 UIU/ML (ref 0.4–4)

## 2021-01-21 PROCEDURE — 80061 LIPID PANEL: CPT

## 2021-01-21 PROCEDURE — 84443 ASSAY THYROID STIM HORMONE: CPT

## 2021-01-21 PROCEDURE — 84439 ASSAY OF FREE THYROXINE: CPT

## 2021-01-21 PROCEDURE — 84481 FREE ASSAY (FT-3): CPT

## 2021-01-21 PROCEDURE — 36415 COLL VENOUS BLD VENIPUNCTURE: CPT

## 2021-01-21 PROCEDURE — 80076 HEPATIC FUNCTION PANEL: CPT

## 2021-01-22 ENCOUNTER — PATIENT MESSAGE (OUTPATIENT)
Dept: INTERNAL MEDICINE | Facility: CLINIC | Age: 61
End: 2021-01-22

## 2021-02-02 ENCOUNTER — PATIENT MESSAGE (OUTPATIENT)
Dept: INTERNAL MEDICINE | Facility: CLINIC | Age: 61
End: 2021-02-02

## 2021-03-05 ENCOUNTER — TELEPHONE (OUTPATIENT)
Dept: OBSTETRICS AND GYNECOLOGY | Facility: CLINIC | Age: 61
End: 2021-03-05

## 2021-04-05 ENCOUNTER — PATIENT MESSAGE (OUTPATIENT)
Dept: ADMINISTRATIVE | Facility: HOSPITAL | Age: 61
End: 2021-04-05

## 2021-04-29 ENCOUNTER — PATIENT MESSAGE (OUTPATIENT)
Dept: ADMINISTRATIVE | Facility: HOSPITAL | Age: 61
End: 2021-04-29

## 2021-04-29 ENCOUNTER — PATIENT MESSAGE (OUTPATIENT)
Dept: INTERNAL MEDICINE | Facility: CLINIC | Age: 61
End: 2021-04-29

## 2021-04-29 DIAGNOSIS — E03.9 HYPOTHYROIDISM, UNSPECIFIED TYPE: Primary | ICD-10-CM

## 2021-04-29 RX ORDER — LIOTHYRONINE SODIUM 25 UG/1
TABLET ORAL
Qty: 45 TABLET | Refills: 5 | Status: SHIPPED | OUTPATIENT
Start: 2021-04-29 | End: 2022-02-11 | Stop reason: SDUPTHER

## 2021-04-29 RX ORDER — LEVOTHYROXINE SODIUM 88 UG/1
88 TABLET ORAL
Qty: 90 TABLET | Refills: 3 | Status: SHIPPED | OUTPATIENT
Start: 2021-04-29 | End: 2022-05-12 | Stop reason: SDUPTHER

## 2021-05-11 ENCOUNTER — LAB VISIT (OUTPATIENT)
Dept: LAB | Facility: HOSPITAL | Age: 61
End: 2021-05-11
Attending: INTERNAL MEDICINE
Payer: COMMERCIAL

## 2021-05-11 ENCOUNTER — PATIENT MESSAGE (OUTPATIENT)
Dept: INTERNAL MEDICINE | Facility: CLINIC | Age: 61
End: 2021-05-11

## 2021-05-11 ENCOUNTER — OFFICE VISIT (OUTPATIENT)
Dept: INTERNAL MEDICINE | Facility: CLINIC | Age: 61
End: 2021-05-11
Payer: COMMERCIAL

## 2021-05-11 VITALS
SYSTOLIC BLOOD PRESSURE: 124 MMHG | HEIGHT: 66 IN | OXYGEN SATURATION: 98 % | WEIGHT: 200 LBS | HEART RATE: 84 BPM | BODY MASS INDEX: 32.14 KG/M2 | DIASTOLIC BLOOD PRESSURE: 80 MMHG

## 2021-05-11 DIAGNOSIS — E03.9 HYPOTHYROIDISM, UNSPECIFIED TYPE: ICD-10-CM

## 2021-05-11 DIAGNOSIS — E03.9 HYPOTHYROIDISM, UNSPECIFIED TYPE: Primary | ICD-10-CM

## 2021-05-11 DIAGNOSIS — R05.9 COUGH: ICD-10-CM

## 2021-05-11 DIAGNOSIS — J02.9 PHARYNGITIS, UNSPECIFIED ETIOLOGY: ICD-10-CM

## 2021-05-11 LAB
ALBUMIN SERPL BCP-MCNC: 3.9 G/DL (ref 3.5–5.2)
ALP SERPL-CCNC: 77 U/L (ref 55–135)
ALT SERPL W/O P-5'-P-CCNC: 42 U/L (ref 10–44)
ANION GAP SERPL CALC-SCNC: 6 MMOL/L (ref 8–16)
AST SERPL-CCNC: 28 U/L (ref 10–40)
BASOPHILS # BLD AUTO: 0.03 K/UL (ref 0–0.2)
BASOPHILS NFR BLD: 0.5 % (ref 0–1.9)
BILIRUB SERPL-MCNC: 0.5 MG/DL (ref 0.1–1)
BUN SERPL-MCNC: 12 MG/DL (ref 8–23)
CALCIUM SERPL-MCNC: 9.4 MG/DL (ref 8.7–10.5)
CHLORIDE SERPL-SCNC: 102 MMOL/L (ref 95–110)
CO2 SERPL-SCNC: 30 MMOL/L (ref 23–29)
CREAT SERPL-MCNC: 0.8 MG/DL (ref 0.5–1.4)
DIFFERENTIAL METHOD: NORMAL
EOSINOPHIL # BLD AUTO: 0.1 K/UL (ref 0–0.5)
EOSINOPHIL NFR BLD: 1 % (ref 0–8)
ERYTHROCYTE [DISTWIDTH] IN BLOOD BY AUTOMATED COUNT: 12.9 % (ref 11.5–14.5)
EST. GFR  (AFRICAN AMERICAN): >60 ML/MIN/1.73 M^2
EST. GFR  (NON AFRICAN AMERICAN): >60 ML/MIN/1.73 M^2
GLUCOSE SERPL-MCNC: 108 MG/DL (ref 70–110)
GROUP A STREP, MOLECULAR: NEGATIVE
HCT VFR BLD AUTO: 41.3 % (ref 37–48.5)
HGB BLD-MCNC: 13.4 G/DL (ref 12–16)
IMM GRANULOCYTES # BLD AUTO: 0.01 K/UL (ref 0–0.04)
IMM GRANULOCYTES NFR BLD AUTO: 0.2 % (ref 0–0.5)
LYMPHOCYTES # BLD AUTO: 1.5 K/UL (ref 1–4.8)
LYMPHOCYTES NFR BLD: 24.5 % (ref 18–48)
MCH RBC QN AUTO: 29.1 PG (ref 27–31)
MCHC RBC AUTO-ENTMCNC: 32.4 G/DL (ref 32–36)
MCV RBC AUTO: 90 FL (ref 82–98)
MONOCYTES # BLD AUTO: 0.5 K/UL (ref 0.3–1)
MONOCYTES NFR BLD: 7.4 % (ref 4–15)
NEUTROPHILS # BLD AUTO: 4.2 K/UL (ref 1.8–7.7)
NEUTROPHILS NFR BLD: 66.4 % (ref 38–73)
NRBC BLD-RTO: 0 /100 WBC
PLATELET # BLD AUTO: 249 K/UL (ref 150–450)
PMV BLD AUTO: 9.7 FL (ref 9.2–12.9)
POTASSIUM SERPL-SCNC: 4.5 MMOL/L (ref 3.5–5.1)
PROT SERPL-MCNC: 7.5 G/DL (ref 6–8.4)
RBC # BLD AUTO: 4.6 M/UL (ref 4–5.4)
SODIUM SERPL-SCNC: 138 MMOL/L (ref 136–145)
T4 FREE SERPL-MCNC: 1.03 NG/DL (ref 0.71–1.51)
TSH SERPL DL<=0.005 MIU/L-ACNC: 3.03 UIU/ML (ref 0.4–4)
WBC # BLD AUTO: 6.25 K/UL (ref 3.9–12.7)

## 2021-05-11 PROCEDURE — 36415 COLL VENOUS BLD VENIPUNCTURE: CPT | Performed by: INTERNAL MEDICINE

## 2021-05-11 PROCEDURE — 85025 COMPLETE CBC W/AUTO DIFF WBC: CPT | Performed by: INTERNAL MEDICINE

## 2021-05-11 PROCEDURE — 1126F AMNT PAIN NOTED NONE PRSNT: CPT | Mod: S$GLB,,, | Performed by: INTERNAL MEDICINE

## 2021-05-11 PROCEDURE — 87651 STREP A DNA AMP PROBE: CPT | Performed by: INTERNAL MEDICINE

## 2021-05-11 PROCEDURE — 99214 PR OFFICE/OUTPT VISIT, EST, LEVL IV, 30-39 MIN: ICD-10-PCS | Mod: 25,S$GLB,, | Performed by: INTERNAL MEDICINE

## 2021-05-11 PROCEDURE — 84481 FREE ASSAY (FT-3): CPT | Performed by: INTERNAL MEDICINE

## 2021-05-11 PROCEDURE — 99999 PR PBB SHADOW E&M-EST. PATIENT-LVL IV: ICD-10-PCS | Mod: PBBFAC,,, | Performed by: INTERNAL MEDICINE

## 2021-05-11 PROCEDURE — 84443 ASSAY THYROID STIM HORMONE: CPT | Performed by: INTERNAL MEDICINE

## 2021-05-11 PROCEDURE — 3008F PR BODY MASS INDEX (BMI) DOCUMENTED: ICD-10-PCS | Mod: CPTII,S$GLB,, | Performed by: INTERNAL MEDICINE

## 2021-05-11 PROCEDURE — 1126F PR PAIN SEVERITY QUANTIFIED, NO PAIN PRESENT: ICD-10-PCS | Mod: S$GLB,,, | Performed by: INTERNAL MEDICINE

## 2021-05-11 PROCEDURE — 96372 THER/PROPH/DIAG INJ SC/IM: CPT | Mod: S$GLB,,, | Performed by: INTERNAL MEDICINE

## 2021-05-11 PROCEDURE — 99999 PR PBB SHADOW E&M-EST. PATIENT-LVL IV: CPT | Mod: PBBFAC,,, | Performed by: INTERNAL MEDICINE

## 2021-05-11 PROCEDURE — 3008F BODY MASS INDEX DOCD: CPT | Mod: CPTII,S$GLB,, | Performed by: INTERNAL MEDICINE

## 2021-05-11 PROCEDURE — 80053 COMPREHEN METABOLIC PANEL: CPT | Performed by: INTERNAL MEDICINE

## 2021-05-11 PROCEDURE — 99214 OFFICE O/P EST MOD 30 MIN: CPT | Mod: 25,S$GLB,, | Performed by: INTERNAL MEDICINE

## 2021-05-11 PROCEDURE — 84439 ASSAY OF FREE THYROXINE: CPT | Performed by: INTERNAL MEDICINE

## 2021-05-11 PROCEDURE — 96372 PR INJECTION,THERAP/PROPH/DIAG2ST, IM OR SUBCUT: ICD-10-PCS | Mod: S$GLB,,, | Performed by: INTERNAL MEDICINE

## 2021-05-11 RX ORDER — AZITHROMYCIN 250 MG/1
TABLET, FILM COATED ORAL
Qty: 6 TABLET | Refills: 0 | Status: SHIPPED | OUTPATIENT
Start: 2021-05-11 | End: 2021-05-11

## 2021-05-11 RX ORDER — TRIAMCINOLONE ACETONIDE 40 MG/ML
40 INJECTION, SUSPENSION INTRA-ARTICULAR; INTRAMUSCULAR
Status: COMPLETED | OUTPATIENT
Start: 2021-05-11 | End: 2021-05-11

## 2021-05-11 RX ORDER — AZITHROMYCIN 250 MG/1
TABLET, FILM COATED ORAL
Qty: 6 TABLET | Refills: 0 | Status: SHIPPED | OUTPATIENT
Start: 2021-05-11 | End: 2021-05-14

## 2021-05-11 RX ADMIN — TRIAMCINOLONE ACETONIDE 40 MG: 40 INJECTION, SUSPENSION INTRA-ARTICULAR; INTRAMUSCULAR at 11:05

## 2021-05-12 ENCOUNTER — PATIENT MESSAGE (OUTPATIENT)
Dept: INTERNAL MEDICINE | Facility: CLINIC | Age: 61
End: 2021-05-12

## 2021-05-12 LAB — T3FREE SERPL-MCNC: 4 PG/ML (ref 2.3–4.2)

## 2021-05-13 ENCOUNTER — OFFICE VISIT (OUTPATIENT)
Dept: INTERNAL MEDICINE | Facility: CLINIC | Age: 61
End: 2021-05-13
Payer: COMMERCIAL

## 2021-05-13 DIAGNOSIS — J06.9 UPPER RESPIRATORY TRACT INFECTION, UNSPECIFIED TYPE: Primary | ICD-10-CM

## 2021-05-13 DIAGNOSIS — R05.9 COUGH: ICD-10-CM

## 2021-05-13 PROCEDURE — 99213 OFFICE O/P EST LOW 20 MIN: CPT | Mod: 95,,, | Performed by: INTERNAL MEDICINE

## 2021-05-13 PROCEDURE — 99213 PR OFFICE/OUTPT VISIT, EST, LEVL III, 20-29 MIN: ICD-10-PCS | Mod: 95,,, | Performed by: INTERNAL MEDICINE

## 2021-05-13 RX ORDER — AZELASTINE 1 MG/ML
2 SPRAY, METERED NASAL 2 TIMES DAILY
Qty: 30 ML | Refills: 0 | Status: SHIPPED | OUTPATIENT
Start: 2021-05-13 | End: 2021-05-14

## 2021-05-14 ENCOUNTER — PATIENT MESSAGE (OUTPATIENT)
Dept: INTERNAL MEDICINE | Facility: CLINIC | Age: 61
End: 2021-05-14

## 2021-05-14 ENCOUNTER — OFFICE VISIT (OUTPATIENT)
Dept: INTERNAL MEDICINE | Facility: CLINIC | Age: 61
End: 2021-05-14
Payer: COMMERCIAL

## 2021-05-14 ENCOUNTER — HOSPITAL ENCOUNTER (OUTPATIENT)
Dept: RADIOLOGY | Facility: HOSPITAL | Age: 61
Discharge: HOME | End: 2021-05-14
Attending: INTERNAL MEDICINE
Payer: COMMERCIAL

## 2021-05-14 VITALS
DIASTOLIC BLOOD PRESSURE: 74 MMHG | BODY MASS INDEX: 30.99 KG/M2 | SYSTOLIC BLOOD PRESSURE: 132 MMHG | HEART RATE: 84 BPM | WEIGHT: 192 LBS | TEMPERATURE: 98 F | OXYGEN SATURATION: 98 %

## 2021-05-14 DIAGNOSIS — J20.9 ACUTE BRONCHITIS, UNSPECIFIED ORGANISM: Primary | ICD-10-CM

## 2021-05-14 DIAGNOSIS — J20.9 ACUTE BRONCHITIS, UNSPECIFIED ORGANISM: ICD-10-CM

## 2021-05-14 DIAGNOSIS — J32.9 SINUSITIS, UNSPECIFIED CHRONICITY, UNSPECIFIED LOCATION: ICD-10-CM

## 2021-05-14 PROCEDURE — 99214 PR OFFICE/OUTPT VISIT, EST, LEVL IV, 30-39 MIN: ICD-10-PCS | Mod: S$GLB,,, | Performed by: INTERNAL MEDICINE

## 2021-05-14 PROCEDURE — 71046 X-RAY EXAM CHEST 2 VIEWS: CPT | Mod: TC

## 2021-05-14 PROCEDURE — 71046 X-RAY EXAM CHEST 2 VIEWS: CPT | Mod: 26,,, | Performed by: RADIOLOGY

## 2021-05-14 PROCEDURE — 99214 OFFICE O/P EST MOD 30 MIN: CPT | Mod: S$GLB,,, | Performed by: INTERNAL MEDICINE

## 2021-05-14 PROCEDURE — 99999 PR PBB SHADOW E&M-EST. PATIENT-LVL IV: ICD-10-PCS | Mod: PBBFAC,,, | Performed by: INTERNAL MEDICINE

## 2021-05-14 PROCEDURE — 3008F BODY MASS INDEX DOCD: CPT | Mod: CPTII,S$GLB,, | Performed by: INTERNAL MEDICINE

## 2021-05-14 PROCEDURE — 99999 PR PBB SHADOW E&M-EST. PATIENT-LVL IV: CPT | Mod: PBBFAC,,, | Performed by: INTERNAL MEDICINE

## 2021-05-14 PROCEDURE — 71046 XR CHEST PA AND LATERAL: ICD-10-PCS | Mod: 26,,, | Performed by: RADIOLOGY

## 2021-05-14 PROCEDURE — 3008F PR BODY MASS INDEX (BMI) DOCUMENTED: ICD-10-PCS | Mod: CPTII,S$GLB,, | Performed by: INTERNAL MEDICINE

## 2021-05-14 RX ORDER — ALBUTEROL SULFATE 90 UG/1
AEROSOL, METERED RESPIRATORY (INHALATION)
COMMUNITY
Start: 2021-05-13 | End: 2021-07-15

## 2021-05-14 RX ORDER — AZELASTINE 1 MG/ML
2 SPRAY, METERED NASAL 2 TIMES DAILY
Qty: 30 ML | Refills: 0 | Status: SHIPPED | OUTPATIENT
Start: 2021-05-14 | End: 2021-07-15

## 2021-05-14 RX ORDER — AZITHROMYCIN 500 MG/1
500 TABLET, FILM COATED ORAL DAILY
Qty: 7 TABLET | Refills: 0 | Status: SHIPPED | OUTPATIENT
Start: 2021-05-14 | End: 2021-05-21

## 2021-05-16 ENCOUNTER — PATIENT MESSAGE (OUTPATIENT)
Dept: INTERNAL MEDICINE | Facility: CLINIC | Age: 61
End: 2021-05-16

## 2021-05-17 ENCOUNTER — PATIENT MESSAGE (OUTPATIENT)
Dept: INTERNAL MEDICINE | Facility: CLINIC | Age: 61
End: 2021-05-17

## 2021-05-17 RX ORDER — ESTRADIOL AND NORETHINDRONE ACETATE .5; .1 MG/1; MG/1
1 TABLET ORAL DAILY
Qty: 84 TABLET | Refills: 0 | Status: SHIPPED | OUTPATIENT
Start: 2021-05-17 | End: 2021-05-18

## 2021-05-18 ENCOUNTER — OFFICE VISIT (OUTPATIENT)
Dept: OBSTETRICS AND GYNECOLOGY | Facility: CLINIC | Age: 61
End: 2021-05-18
Attending: OBSTETRICS & GYNECOLOGY
Payer: COMMERCIAL

## 2021-05-18 VITALS
SYSTOLIC BLOOD PRESSURE: 124 MMHG | WEIGHT: 194.13 LBS | BODY MASS INDEX: 31.2 KG/M2 | DIASTOLIC BLOOD PRESSURE: 74 MMHG | HEIGHT: 66 IN

## 2021-05-18 DIAGNOSIS — Z12.31 ENCOUNTER FOR SCREENING MAMMOGRAM FOR BREAST CANCER: ICD-10-CM

## 2021-05-18 DIAGNOSIS — Z12.4 SCREENING FOR MALIGNANT NEOPLASM OF THE CERVIX: Primary | ICD-10-CM

## 2021-05-18 DIAGNOSIS — Z78.0 MENOPAUSE: ICD-10-CM

## 2021-05-18 DIAGNOSIS — N95.0 POSTMENOPAUSAL BLEEDING: ICD-10-CM

## 2021-05-18 DIAGNOSIS — Z01.419 ENCOUNTER FOR GYNECOLOGICAL EXAMINATION: ICD-10-CM

## 2021-05-18 DIAGNOSIS — Z11.51 SCREENING FOR HUMAN PAPILLOMAVIRUS (HPV): ICD-10-CM

## 2021-05-18 PROCEDURE — 99999 PR PBB SHADOW E&M-EST. PATIENT-LVL IV: CPT | Mod: PBBFAC,,, | Performed by: OBSTETRICS & GYNECOLOGY

## 2021-05-18 PROCEDURE — 87624 HPV HI-RISK TYP POOLED RSLT: CPT | Performed by: OBSTETRICS & GYNECOLOGY

## 2021-05-18 PROCEDURE — 99999 PR PBB SHADOW E&M-EST. PATIENT-LVL IV: ICD-10-PCS | Mod: PBBFAC,,, | Performed by: OBSTETRICS & GYNECOLOGY

## 2021-05-18 PROCEDURE — 88175 CYTOPATH C/V AUTO FLUID REDO: CPT | Performed by: OBSTETRICS & GYNECOLOGY

## 2021-05-18 PROCEDURE — 3008F BODY MASS INDEX DOCD: CPT | Mod: CPTII,S$GLB,, | Performed by: OBSTETRICS & GYNECOLOGY

## 2021-05-18 PROCEDURE — 99396 PR PREVENTIVE VISIT,EST,40-64: ICD-10-PCS | Mod: S$GLB,,, | Performed by: OBSTETRICS & GYNECOLOGY

## 2021-05-18 PROCEDURE — 3008F PR BODY MASS INDEX (BMI) DOCUMENTED: ICD-10-PCS | Mod: CPTII,S$GLB,, | Performed by: OBSTETRICS & GYNECOLOGY

## 2021-05-18 PROCEDURE — 1126F AMNT PAIN NOTED NONE PRSNT: CPT | Mod: S$GLB,,, | Performed by: OBSTETRICS & GYNECOLOGY

## 2021-05-18 PROCEDURE — 99396 PREV VISIT EST AGE 40-64: CPT | Mod: S$GLB,,, | Performed by: OBSTETRICS & GYNECOLOGY

## 2021-05-18 PROCEDURE — 1126F PR PAIN SEVERITY QUANTIFIED, NO PAIN PRESENT: ICD-10-PCS | Mod: S$GLB,,, | Performed by: OBSTETRICS & GYNECOLOGY

## 2021-05-18 RX ORDER — PROGESTERONE 100 MG/1
CAPSULE ORAL
Qty: 30 CAPSULE | Refills: 11 | Status: SHIPPED | OUTPATIENT
Start: 2021-05-18 | End: 2022-09-13

## 2021-05-18 RX ORDER — LORATADINE 10 MG/1
10 TABLET ORAL DAILY
COMMUNITY

## 2021-05-18 RX ORDER — ESTRADIOL 1 MG/1
1 TABLET ORAL DAILY
Qty: 30 TABLET | Refills: 11 | Status: SHIPPED | OUTPATIENT
Start: 2021-05-18 | End: 2022-09-13

## 2021-05-18 RX ORDER — LEVOFLOXACIN 750 MG/1
750 TABLET ORAL DAILY
COMMUNITY
Start: 2021-05-13 | End: 2021-07-15

## 2021-05-19 ENCOUNTER — OFFICE VISIT (OUTPATIENT)
Dept: INTERNAL MEDICINE | Facility: CLINIC | Age: 61
End: 2021-05-19
Payer: COMMERCIAL

## 2021-05-19 VITALS
OXYGEN SATURATION: 98 % | WEIGHT: 191 LBS | HEIGHT: 65 IN | DIASTOLIC BLOOD PRESSURE: 82 MMHG | SYSTOLIC BLOOD PRESSURE: 126 MMHG | HEART RATE: 89 BPM | BODY MASS INDEX: 31.82 KG/M2

## 2021-05-19 DIAGNOSIS — J40 BRONCHITIS: Primary | ICD-10-CM

## 2021-05-19 DIAGNOSIS — R19.5 LOOSE STOOLS: ICD-10-CM

## 2021-05-19 PROCEDURE — 3008F BODY MASS INDEX DOCD: CPT | Mod: CPTII,S$GLB,, | Performed by: INTERNAL MEDICINE

## 2021-05-19 PROCEDURE — 99214 OFFICE O/P EST MOD 30 MIN: CPT | Mod: S$GLB,,, | Performed by: INTERNAL MEDICINE

## 2021-05-19 PROCEDURE — 1126F PR PAIN SEVERITY QUANTIFIED, NO PAIN PRESENT: ICD-10-PCS | Mod: S$GLB,,, | Performed by: INTERNAL MEDICINE

## 2021-05-19 PROCEDURE — 99214 PR OFFICE/OUTPT VISIT, EST, LEVL IV, 30-39 MIN: ICD-10-PCS | Mod: S$GLB,,, | Performed by: INTERNAL MEDICINE

## 2021-05-19 PROCEDURE — 99999 PR PBB SHADOW E&M-EST. PATIENT-LVL IV: CPT | Mod: PBBFAC,,, | Performed by: INTERNAL MEDICINE

## 2021-05-19 PROCEDURE — 3008F PR BODY MASS INDEX (BMI) DOCUMENTED: ICD-10-PCS | Mod: CPTII,S$GLB,, | Performed by: INTERNAL MEDICINE

## 2021-05-19 PROCEDURE — 99999 PR PBB SHADOW E&M-EST. PATIENT-LVL IV: ICD-10-PCS | Mod: PBBFAC,,, | Performed by: INTERNAL MEDICINE

## 2021-05-19 PROCEDURE — 1126F AMNT PAIN NOTED NONE PRSNT: CPT | Mod: S$GLB,,, | Performed by: INTERNAL MEDICINE

## 2021-05-22 LAB
FINAL PATHOLOGIC DIAGNOSIS: NORMAL
Lab: NORMAL

## 2021-05-25 LAB
HPV HR 12 DNA SPEC QL NAA+PROBE: NEGATIVE
HPV16 AG SPEC QL: NEGATIVE
HPV18 DNA SPEC QL NAA+PROBE: NEGATIVE

## 2021-07-15 ENCOUNTER — OFFICE VISIT (OUTPATIENT)
Dept: OBSTETRICS AND GYNECOLOGY | Facility: CLINIC | Age: 61
End: 2021-07-15
Attending: OBSTETRICS & GYNECOLOGY
Payer: COMMERCIAL

## 2021-07-15 VITALS
SYSTOLIC BLOOD PRESSURE: 144 MMHG | HEIGHT: 65 IN | WEIGHT: 194 LBS | DIASTOLIC BLOOD PRESSURE: 82 MMHG | BODY MASS INDEX: 32.32 KG/M2

## 2021-07-15 DIAGNOSIS — N84.0 ENDOMETRIAL POLYP: ICD-10-CM

## 2021-07-15 DIAGNOSIS — R93.89 THICKENED ENDOMETRIUM: Primary | ICD-10-CM

## 2021-07-15 PROCEDURE — 99214 OFFICE O/P EST MOD 30 MIN: CPT | Mod: S$GLB,,, | Performed by: OBSTETRICS & GYNECOLOGY

## 2021-07-15 PROCEDURE — 3008F PR BODY MASS INDEX (BMI) DOCUMENTED: ICD-10-PCS | Mod: CPTII,S$GLB,, | Performed by: OBSTETRICS & GYNECOLOGY

## 2021-07-15 PROCEDURE — 3008F BODY MASS INDEX DOCD: CPT | Mod: CPTII,S$GLB,, | Performed by: OBSTETRICS & GYNECOLOGY

## 2021-07-15 PROCEDURE — 99999 PR PBB SHADOW E&M-EST. PATIENT-LVL III: ICD-10-PCS | Mod: PBBFAC,,, | Performed by: OBSTETRICS & GYNECOLOGY

## 2021-07-15 PROCEDURE — 99999 PR PBB SHADOW E&M-EST. PATIENT-LVL III: CPT | Mod: PBBFAC,,, | Performed by: OBSTETRICS & GYNECOLOGY

## 2021-07-15 PROCEDURE — 99214 PR OFFICE/OUTPT VISIT, EST, LEVL IV, 30-39 MIN: ICD-10-PCS | Mod: S$GLB,,, | Performed by: OBSTETRICS & GYNECOLOGY

## 2021-07-15 PROCEDURE — 1126F AMNT PAIN NOTED NONE PRSNT: CPT | Mod: S$GLB,,, | Performed by: OBSTETRICS & GYNECOLOGY

## 2021-07-15 PROCEDURE — 1126F PR PAIN SEVERITY QUANTIFIED, NO PAIN PRESENT: ICD-10-PCS | Mod: S$GLB,,, | Performed by: OBSTETRICS & GYNECOLOGY

## 2021-07-15 RX ORDER — BUPROPION HYDROCHLORIDE 75 MG/1
75 TABLET ORAL EVERY MORNING
COMMUNITY
Start: 2021-07-13 | End: 2022-09-13

## 2021-08-03 ENCOUNTER — HOSPITAL ENCOUNTER (OUTPATIENT)
Dept: RADIOLOGY | Facility: HOSPITAL | Age: 61
Discharge: HOME | End: 2021-08-03
Attending: OBSTETRICS & GYNECOLOGY
Payer: COMMERCIAL

## 2021-08-03 DIAGNOSIS — Z12.31 ENCOUNTER FOR SCREENING MAMMOGRAM FOR BREAST CANCER: ICD-10-CM

## 2021-08-03 PROCEDURE — 77067 SCR MAMMO BI INCL CAD: CPT | Mod: TC

## 2021-08-03 PROCEDURE — 77063 BREAST TOMOSYNTHESIS BI: CPT | Mod: 26,,, | Performed by: RADIOLOGY

## 2021-08-03 PROCEDURE — 77067 MAMMO DIGITAL SCREENING BILAT WITH TOMO: ICD-10-PCS | Mod: 26,,, | Performed by: RADIOLOGY

## 2021-08-03 PROCEDURE — 77067 SCR MAMMO BI INCL CAD: CPT | Mod: 26,,, | Performed by: RADIOLOGY

## 2021-08-03 PROCEDURE — 77063 MAMMO DIGITAL SCREENING BILAT WITH TOMO: ICD-10-PCS | Mod: 26,,, | Performed by: RADIOLOGY

## 2021-08-06 ENCOUNTER — TELEPHONE (OUTPATIENT)
Dept: OBSTETRICS AND GYNECOLOGY | Facility: CLINIC | Age: 61
End: 2021-08-06
Payer: COMMERCIAL

## 2021-08-25 NOTE — TELEPHONE ENCOUNTER
Pt was not schedule for labs today so i scheduled his labs and when we get the results we will post to [facility] and fax over a amadou to MD Rousseau   Pulmonary Critical Care Progress Note    Patient: [place] Date: 2021   : 1974 Attending: Prashant Ash MD   ? ?       Subjective  Doing better   No signs of DTs     Objective    I/O last 3 completed shifts:  In: 150 [I.V.:150]  Out: 2000 [Urine:2000]  No intake/output data recorded.    Vital Last Value 24 Hour Range   Temperature 97.5 °F (36.4 °C) (21 1100) Temp  Min: 96.8 °F (36 °C)  Max: 98.1 °F (36.7 °C)   Pulse 90 (21 1010) Pulse  Min: 72  Max: 90   Respiratory 17 (21 0700) Resp  Min: 14  Max: 24   Non-Invasive  Blood Pressure (!) 148/90 (21 1010) BP  Min: 138/82  Max: 171/73   Pulse Oximetry 99 % (21 0700) SpO2  Min: 92 %  Max: 99 %   Arterial   Blood Pressure   No data recorded     PERTINENT DIAGNOSTICS/PROCEDURES:    No results found.    Recent Results (from the past 24 hour(s))   GLUCOSE, BEDSIDE - POINT OF CARE    Collection Time: 21  5:14 PM   Result Value Ref Range    GLUCOSE, BEDSIDE - POINT OF CARE 83 70 - 99 mg/dL   GLUCOSE, BEDSIDE - POINT OF CARE    Collection Time: 21  7:51 AM   Result Value Ref Range    GLUCOSE, BEDSIDE - POINT OF CARE 112 (H) 70 - 99 mg/dL   Comprehensive Metabolic Panel    Collection Time: 21  7:56 AM   Result Value Ref Range    Fasting Status      Sodium 134 (L) 135 - 145 mmol/L    Potassium 3.8 3.4 - 5.1 mmol/L    Chloride 96 (L) 98 - 107 mmol/L    Carbon Dioxide 26 21 - 32 mmol/L    Anion Gap 16 10 - 20 mmol/L    Glucose 87 65 - 99 mg/dL    BUN 7 6 - 20 mg/dL    Creatinine 0.58 (L) 0.67 - 1.17 mg/dL    Glomerular Filtration Rate >90 >=60    BUN/ Creatinine Ratio 12 7 - 25    Calcium 8.9 8.4 - 10.2 mg/dL    Bilirubin, Total 1.1 (H) 0.2 - 1.0 mg/dL    GOT/AST 90 (H) <=37 Units/L    GPT/ALT 48 <64 Units/L    Alkaline Phosphatase 117 45 - 117 Units/L    Albumin 3.4 (L) 3.6 - 5.1 g/dL    Protein, Total 8.6 (H) 6.4 - 8.2 g/dL    Globulin 5.2 (H) 2.0 - 4.0 g/dL    A/G Ratio 0.7 (L) 1.0 - 2.4   CBC No Differential     Collection Time: 08/25/21  7:56 AM   Result Value Ref Range    WBC 4.0 (L) 4.2 - 11.0 K/mcL    RBC 4.14 (L) 4.50 - 5.90 mil/mcL    HGB 13.5 13.0 - 17.0 g/dL    HCT 37.9 (L) 39.0 - 51.0 %    MCV 91.5 78.0 - 100.0 fl    MCH 32.6 26.0 - 34.0 pg    MCHC 35.6 32.0 - 36.5 g/dL    PLT 58 (L) 140 - 450 K/mcL    RDW-CV 12.6 11.0 - 15.0 %    RDW-SD 42.4 39.0 - 50.0 fL    NRBC 0 <=0 /100 WBC   GLUCOSE, BEDSIDE - POINT OF CARE    Collection Time: 08/25/21 11:31 AM   Result Value Ref Range    GLUCOSE, BEDSIDE - POINT OF CARE 173 (H) 70 - 99 mg/dL        Physical Exam:  General: awake, alert and responsive  Neuro: awake & alert, moves all; extremities, PERRL.  HEENT: Normal  Neck: supple  Lungs: decreased breath sounds B/L  Heart: RRR, no murmurs  Abdomen: soft, ND, NT, BS+  Extremities: unremarkable. Good range of motion, edema  Skin: intact    Pertinent Reviewed: Allergies, Medications, Labs, Imaging, and Physician and Nursing Notes    ASSESSMENT    Alcohol use disorder with alcohol intoxication and delirium  Hyponatremia  Chronic peptic ulcer disease and GERD  Coffee-ground emesis  Esophagitis  Alcoholic liver disease with pancytopenia  Acute on chronic thrombocytopenia  Plan  Send to [facility]         Critical care services I provided 35 minutes      Said MD Dario  8/25/2021

## 2021-09-28 ENCOUNTER — TELEPHONE (OUTPATIENT)
Dept: OBSTETRICS AND GYNECOLOGY | Facility: CLINIC | Age: 61
End: 2021-09-28
Payer: COMMERCIAL

## 2022-01-28 ENCOUNTER — TELEPHONE (OUTPATIENT)
Dept: INTERNAL MEDICINE | Facility: CLINIC | Age: 62
End: 2022-01-28
Payer: COMMERCIAL

## 2022-01-28 DIAGNOSIS — Z00.00 ANNUAL PHYSICAL EXAM: Primary | ICD-10-CM

## 2022-01-28 DIAGNOSIS — E03.9 HYPOTHYROIDISM, UNSPECIFIED TYPE: ICD-10-CM

## 2022-01-28 DIAGNOSIS — R00.0 TACHYCARDIA: ICD-10-CM

## 2022-01-28 NOTE — TELEPHONE ENCOUNTER
----- Message from Ca Mark sent at 1/28/2022  9:32 AM CST -----  Contact: [phone]  Patient would like to speak to the nurse in regards to a lab orders she needs for Monday morning. Please call and advise.

## 2022-01-31 ENCOUNTER — LAB VISIT (OUTPATIENT)
Dept: LAB | Facility: HOSPITAL | Age: 62
End: 2022-01-31
Attending: INTERNAL MEDICINE
Payer: COMMERCIAL

## 2022-01-31 DIAGNOSIS — Z00.00 ANNUAL PHYSICAL EXAM: ICD-10-CM

## 2022-01-31 DIAGNOSIS — E03.9 HYPOTHYROIDISM, UNSPECIFIED TYPE: ICD-10-CM

## 2022-01-31 DIAGNOSIS — R00.0 TACHYCARDIA: ICD-10-CM

## 2022-01-31 LAB
ALBUMIN SERPL BCP-MCNC: 3.9 G/DL (ref 3.5–5.2)
ALP SERPL-CCNC: 86 U/L (ref 55–135)
ALT SERPL W/O P-5'-P-CCNC: 32 U/L (ref 10–44)
ANION GAP SERPL CALC-SCNC: 10 MMOL/L (ref 8–16)
AST SERPL-CCNC: 23 U/L (ref 10–40)
BASOPHILS # BLD AUTO: 0.02 K/UL (ref 0–0.2)
BASOPHILS NFR BLD: 0.4 % (ref 0–1.9)
BILIRUB SERPL-MCNC: 0.5 MG/DL (ref 0.1–1)
BUN SERPL-MCNC: 13 MG/DL (ref 8–23)
CALCIUM SERPL-MCNC: 9.3 MG/DL (ref 8.7–10.5)
CHLORIDE SERPL-SCNC: 107 MMOL/L (ref 95–110)
CHOLEST SERPL-MCNC: 163 MG/DL (ref 120–199)
CHOLEST/HDLC SERPL: 3.7 {RATIO} (ref 2–5)
CO2 SERPL-SCNC: 24 MMOL/L (ref 23–29)
CREAT SERPL-MCNC: 0.6 MG/DL (ref 0.5–1.4)
DIFFERENTIAL METHOD: ABNORMAL
EOSINOPHIL # BLD AUTO: 0.1 K/UL (ref 0–0.5)
EOSINOPHIL NFR BLD: 0.9 % (ref 0–8)
ERYTHROCYTE [DISTWIDTH] IN BLOOD BY AUTOMATED COUNT: 12.7 % (ref 11.5–14.5)
EST. GFR  (AFRICAN AMERICAN): >60 ML/MIN/1.73 M^2
EST. GFR  (NON AFRICAN AMERICAN): >60 ML/MIN/1.73 M^2
GLUCOSE SERPL-MCNC: 113 MG/DL (ref 70–110)
HCT VFR BLD AUTO: 41.8 % (ref 37–48.5)
HDLC SERPL-MCNC: 44 MG/DL (ref 40–75)
HDLC SERPL: 27 % (ref 20–50)
HGB BLD-MCNC: 13.1 G/DL (ref 12–16)
IMM GRANULOCYTES # BLD AUTO: 0 K/UL (ref 0–0.04)
IMM GRANULOCYTES NFR BLD AUTO: 0 % (ref 0–0.5)
LDLC SERPL CALC-MCNC: 105.2 MG/DL (ref 63–159)
LYMPHOCYTES # BLD AUTO: 1.7 K/UL (ref 1–4.8)
LYMPHOCYTES NFR BLD: 30.2 % (ref 18–48)
MAGNESIUM SERPL-MCNC: 2.1 MG/DL (ref 1.6–2.6)
MCH RBC QN AUTO: 29.1 PG (ref 27–31)
MCHC RBC AUTO-ENTMCNC: 31.3 G/DL (ref 32–36)
MCV RBC AUTO: 93 FL (ref 82–98)
MONOCYTES # BLD AUTO: 0.4 K/UL (ref 0.3–1)
MONOCYTES NFR BLD: 6.4 % (ref 4–15)
NEUTROPHILS # BLD AUTO: 3.5 K/UL (ref 1.8–7.7)
NEUTROPHILS NFR BLD: 62.1 % (ref 38–73)
NONHDLC SERPL-MCNC: 119 MG/DL
NRBC BLD-RTO: 0 /100 WBC
PLATELET # BLD AUTO: 270 K/UL (ref 150–450)
PMV BLD AUTO: 10.1 FL (ref 9.2–12.9)
POTASSIUM SERPL-SCNC: 4.5 MMOL/L (ref 3.5–5.1)
PROT SERPL-MCNC: 7.3 G/DL (ref 6–8.4)
RBC # BLD AUTO: 4.5 M/UL (ref 4–5.4)
SODIUM SERPL-SCNC: 141 MMOL/L (ref 136–145)
T4 FREE SERPL-MCNC: 0.92 NG/DL (ref 0.71–1.51)
TRIGL SERPL-MCNC: 69 MG/DL (ref 30–150)
TSH SERPL DL<=0.005 MIU/L-ACNC: 2.25 UIU/ML (ref 0.4–4)
WBC # BLD AUTO: 5.63 K/UL (ref 3.9–12.7)

## 2022-01-31 PROCEDURE — 83735 ASSAY OF MAGNESIUM: CPT | Performed by: INTERNAL MEDICINE

## 2022-01-31 PROCEDURE — 80061 LIPID PANEL: CPT | Performed by: INTERNAL MEDICINE

## 2022-01-31 PROCEDURE — 84439 ASSAY OF FREE THYROXINE: CPT | Performed by: INTERNAL MEDICINE

## 2022-01-31 PROCEDURE — 85025 COMPLETE CBC W/AUTO DIFF WBC: CPT | Performed by: INTERNAL MEDICINE

## 2022-01-31 PROCEDURE — 84443 ASSAY THYROID STIM HORMONE: CPT | Performed by: INTERNAL MEDICINE

## 2022-01-31 PROCEDURE — 80053 COMPREHEN METABOLIC PANEL: CPT | Performed by: INTERNAL MEDICINE

## 2022-02-02 ENCOUNTER — PATIENT MESSAGE (OUTPATIENT)
Dept: INTERNAL MEDICINE | Facility: CLINIC | Age: 62
End: 2022-02-02
Payer: COMMERCIAL

## 2022-02-14 ENCOUNTER — PATIENT MESSAGE (OUTPATIENT)
Dept: INTERNAL MEDICINE | Facility: CLINIC | Age: 62
End: 2022-02-14
Payer: COMMERCIAL

## 2022-03-02 NOTE — TELEPHONE ENCOUNTER
----- Message from Debbie Ortega sent at 3/2/2022  3:53 PM CST -----  Requesting an RX refill or new RX.  Is this a refill or new RX: Refill 1  RX name and strength (copy/paste from chart):  CYTOMEL 25 mcg Tab  Is this a 30 day or 90 day RX:   Pharmacy name and phone # (copy/paste from chart):  [facility] office -[phone].  The doctors have asked that we provide their patients with the following 2 reminders -- prescription refills can take up to 72 hours, and a friendly reminder that in the future you can use your [facility] account to request refills: patient is out of [facility].

## 2022-03-02 NOTE — TELEPHONE ENCOUNTER
No new care gaps identified.  Powered by [employer]. Reference number: [id number].   3/02/2022 4:19:00 PM CST

## 2022-03-03 RX ORDER — LIOTHYRONINE SODIUM 25 UG/1
TABLET ORAL
Qty: 45 TABLET | Refills: 5 | Status: SHIPPED | OUTPATIENT
Start: 2022-03-03 | End: 2022-08-04 | Stop reason: SDUPTHER

## 2022-05-12 ENCOUNTER — PATIENT MESSAGE (OUTPATIENT)
Dept: INTERNAL MEDICINE | Facility: CLINIC | Age: 62
End: 2022-05-12
Payer: COMMERCIAL

## 2022-05-12 RX ORDER — LEVOTHYROXINE SODIUM 88 UG/1
88 TABLET ORAL
Qty: 90 TABLET | Refills: 3 | Status: SHIPPED | OUTPATIENT
Start: 2022-05-12 | End: 2023-05-03 | Stop reason: SDUPTHER

## 2022-05-12 NOTE — TELEPHONE ENCOUNTER
No new care gaps identified.  [facility] Embedded Care Gaps. Reference number: [id number]. 5/12/2022   10:33:09 AM RMT

## 2022-06-28 ENCOUNTER — TELEPHONE (OUTPATIENT)
Dept: INTERNAL MEDICINE | Facility: CLINIC | Age: 62
End: 2022-06-28
Payer: COMMERCIAL

## 2022-06-28 RX ORDER — AZITHROMYCIN 250 MG/1
TABLET, FILM COATED ORAL
Qty: 6 TABLET | Refills: 0 | Status: SHIPPED | OUTPATIENT
Start: 2022-06-28 | End: 2022-09-13

## 2022-06-28 RX ORDER — ONDANSETRON 4 MG/1
4 TABLET, FILM COATED ORAL EVERY 8 HOURS PRN
Qty: 15 TABLET | Refills: 0 | Status: SHIPPED | OUTPATIENT
Start: 2022-06-28 | End: 2023-11-20 | Stop reason: SDUPTHER

## 2022-06-28 RX ORDER — FLUOXETINE 10 MG/1
10 CAPSULE ORAL EVERY MORNING
COMMUNITY
Start: 2022-06-22 | End: 2023-01-31

## 2022-06-28 NOTE — TELEPHONE ENCOUNTER
R/t call to pt and informed her that Dr. Contreras will call her and she does not need to come into clinic and she can r/s appt for tomorrow. Understanding verbalized.         ----- Message from Emmy Luke sent at 6/28/2022 10:00 AM CDT -----  Contact: Abiola   1MEDICALADVICE     Patient is calling for Medical Advice regarding:cough & sinus drip     How long has patient had these symptoms:yesterday     Ph[facility] name and phone#: [facility]     Would like response via Citra Stylet: call back     Comments:Abiola has an appt tomorrow with Dr Contreras

## 2022-07-01 ENCOUNTER — PATIENT MESSAGE (OUTPATIENT)
Dept: INTERNAL MEDICINE | Facility: CLINIC | Age: 62
End: 2022-07-01
Payer: COMMERCIAL

## 2022-07-19 ENCOUNTER — PATIENT MESSAGE (OUTPATIENT)
Dept: RESEARCH | Facility: CLINIC | Age: 62
End: 2022-07-19
Payer: COMMERCIAL

## 2022-08-04 ENCOUNTER — PATIENT MESSAGE (OUTPATIENT)
Dept: INTERNAL MEDICINE | Facility: CLINIC | Age: 62
End: 2022-08-04
Payer: COMMERCIAL

## 2022-08-04 RX ORDER — LIOTHYRONINE SODIUM 25 UG/1
TABLET ORAL
Qty: 45 TABLET | Refills: 5 | Status: SHIPPED | OUTPATIENT
Start: 2022-08-04 | End: 2022-08-11 | Stop reason: SDUPTHER

## 2022-08-10 ENCOUNTER — PATIENT MESSAGE (OUTPATIENT)
Dept: INTERNAL MEDICINE | Facility: CLINIC | Age: 62
End: 2022-08-10
Payer: COMMERCIAL

## 2022-08-11 DIAGNOSIS — Z12.31 OTHER SCREENING MAMMOGRAM: ICD-10-CM

## 2022-09-01 ENCOUNTER — LAB VISIT (OUTPATIENT)
Dept: LAB | Facility: HOSPITAL | Age: 62
End: 2022-09-01
Attending: INTERNAL MEDICINE
Payer: COMMERCIAL

## 2022-09-01 DIAGNOSIS — R79.89 ABNORMAL LIVER FUNCTION TEST: ICD-10-CM

## 2022-09-01 DIAGNOSIS — E03.9 HYPOTHYROIDISM, UNSPECIFIED TYPE: ICD-10-CM

## 2022-09-01 DIAGNOSIS — R00.0 TACHYCARDIA: ICD-10-CM

## 2022-09-01 DIAGNOSIS — Z00.00 ANNUAL PHYSICAL EXAM: Primary | ICD-10-CM

## 2022-09-01 DIAGNOSIS — Z00.00 ANNUAL PHYSICAL EXAM: ICD-10-CM

## 2022-09-01 LAB
25(OH)D3+25(OH)D2 SERPL-MCNC: 91 NG/ML (ref 30–96)
ALBUMIN SERPL BCP-MCNC: 4 G/DL (ref 3.5–5.2)
ALP SERPL-CCNC: 87 U/L (ref 55–135)
ALT SERPL W/O P-5'-P-CCNC: 32 U/L (ref 10–44)
ANION GAP SERPL CALC-SCNC: 9 MMOL/L (ref 8–16)
AST SERPL-CCNC: 24 U/L (ref 10–40)
BASOPHILS # BLD AUTO: 0.02 K/UL (ref 0–0.2)
BASOPHILS NFR BLD: 0.4 % (ref 0–1.9)
BILIRUB SERPL-MCNC: 0.5 MG/DL (ref 0.1–1)
BUN SERPL-MCNC: 15 MG/DL (ref 8–23)
CALCIUM SERPL-MCNC: 9.6 MG/DL (ref 8.7–10.5)
CHLORIDE SERPL-SCNC: 108 MMOL/L (ref 95–110)
CHOLEST SERPL-MCNC: 172 MG/DL (ref 120–199)
CHOLEST/HDLC SERPL: 3.5 {RATIO} (ref 2–5)
CO2 SERPL-SCNC: 25 MMOL/L (ref 23–29)
CREAT SERPL-MCNC: 0.6 MG/DL (ref 0.5–1.4)
DIFFERENTIAL METHOD: NORMAL
EOSINOPHIL # BLD AUTO: 0 K/UL (ref 0–0.5)
EOSINOPHIL NFR BLD: 0.6 % (ref 0–8)
ERYTHROCYTE [DISTWIDTH] IN BLOOD BY AUTOMATED COUNT: 12.8 % (ref 11.5–14.5)
EST. GFR  (NO RACE VARIABLE): >60 ML/MIN/1.73 M^2
GLUCOSE SERPL-MCNC: 103 MG/DL (ref 70–110)
HCT VFR BLD AUTO: 40.3 % (ref 37–48.5)
HDLC SERPL-MCNC: 49 MG/DL (ref 40–75)
HDLC SERPL: 28.5 % (ref 20–50)
HGB BLD-MCNC: 13.5 G/DL (ref 12–16)
IMM GRANULOCYTES # BLD AUTO: 0.01 K/UL (ref 0–0.04)
IMM GRANULOCYTES NFR BLD AUTO: 0.2 % (ref 0–0.5)
LDLC SERPL CALC-MCNC: 109.8 MG/DL (ref 63–159)
LYMPHOCYTES # BLD AUTO: 1.6 K/UL (ref 1–4.8)
LYMPHOCYTES NFR BLD: 29.3 % (ref 18–48)
MCH RBC QN AUTO: 30.1 PG (ref 27–31)
MCHC RBC AUTO-ENTMCNC: 33.5 G/DL (ref 32–36)
MCV RBC AUTO: 90 FL (ref 82–98)
MONOCYTES # BLD AUTO: 0.3 K/UL (ref 0.3–1)
MONOCYTES NFR BLD: 5.4 % (ref 4–15)
NEUTROPHILS # BLD AUTO: 3.5 K/UL (ref 1.8–7.7)
NEUTROPHILS NFR BLD: 64.1 % (ref 38–73)
NONHDLC SERPL-MCNC: 123 MG/DL
NRBC BLD-RTO: 0 /100 WBC
PLATELET # BLD AUTO: 273 K/UL (ref 150–450)
PMV BLD AUTO: 9.7 FL (ref 9.2–12.9)
POTASSIUM SERPL-SCNC: 4.6 MMOL/L (ref 3.5–5.1)
PROT SERPL-MCNC: 7.3 G/DL (ref 6–8.4)
RBC # BLD AUTO: 4.48 M/UL (ref 4–5.4)
SODIUM SERPL-SCNC: 142 MMOL/L (ref 136–145)
T4 FREE SERPL-MCNC: 0.97 NG/DL (ref 0.71–1.51)
TRIGL SERPL-MCNC: 66 MG/DL (ref 30–150)
TSH SERPL DL<=0.005 MIU/L-ACNC: 1.76 UIU/ML (ref 0.4–4)
WBC # BLD AUTO: 5.39 K/UL (ref 3.9–12.7)

## 2022-09-01 PROCEDURE — 84480 ASSAY TRIIODOTHYRONINE (T3): CPT | Performed by: INTERNAL MEDICINE

## 2022-09-01 PROCEDURE — 85025 COMPLETE CBC W/AUTO DIFF WBC: CPT | Performed by: INTERNAL MEDICINE

## 2022-09-01 PROCEDURE — 80053 COMPREHEN METABOLIC PANEL: CPT | Performed by: INTERNAL MEDICINE

## 2022-09-01 PROCEDURE — 82306 VITAMIN D 25 HYDROXY: CPT | Performed by: INTERNAL MEDICINE

## 2022-09-01 PROCEDURE — 80061 LIPID PANEL: CPT | Performed by: INTERNAL MEDICINE

## 2022-09-01 PROCEDURE — 84443 ASSAY THYROID STIM HORMONE: CPT | Performed by: INTERNAL MEDICINE

## 2022-09-01 PROCEDURE — 36415 COLL VENOUS BLD VENIPUNCTURE: CPT | Performed by: INTERNAL MEDICINE

## 2022-09-01 PROCEDURE — 84439 ASSAY OF FREE THYROXINE: CPT | Performed by: INTERNAL MEDICINE

## 2022-09-03 LAB — T3 SERPL-MCNC: 118 NG/DL (ref 60–180)

## 2022-09-12 NOTE — PROGRESS NOTES
MEDICAL HISTORY:  Hypothyroid disease.  Fatty liver with focal nodular hyperplasia.  Anxiety disorder.  Cholecystectomy with pancreatitis.   x3.  Abdominal hernia repair.  Diverticula disease of the colon, for which she had in , a sigmoid colectomy due to diverticulitis, then a stent put in a month later because of microperforation. In 2015, she underwent a balloon dilatation due to stenosis of the colonic anastomosis      Oncology History Overview Note   Diverticulitis   1. 2014: Surgery - Lap segmental resection of sigmoid cancer  2. Complicated by anastomotic leak and subsequent stricture managed conservatively with IR drain and repeat balloon dilations  3. 12/7/15: Robo lap exploration, laparoscopic hand-assisted redo low anterior resection, hand assisted omental advancement flap, cysto and stents    SOCIAL HISTORY:  Tobacco use and alcohol use -- none.  , has three children.  Exercises by walking.       FAMILY HISTORY:  Her father is , kidney cancer.  Mother is still living, diabetes.  Two sisters, one with depression.    Medications  Xanax 0.5 mg 5 times daily   cytomel 25 mcg half a tablet daily   Levothyroxine 0.088 mg daily   Over-the-counter Pepcid as needed      62-year-old female   Presents for annual routine visit     For the past year she has been having persistent low back pain and bilateral hip pain.  The pain is more the lateral aspect of the upper thigh.  She is going to a chiropractor and physical therapist in the past.  At times will take Advil.  This indicates since having a fall a year ago    For the past month, she has been having pain along the radial aspect of the wrist particularly when hold on to something    She would like to get a chest x-ray.  She was told of having mold in the house.  However she has has no shortness of breath cough    She has been told that she is allergic to penicillin cephalosporin in liked the determine if this is truly the  case.    She is followed by psychiatrist regarding anxiety is currently taking Xanax 0.5 mg with 5 times daily    She will like to get an COVID antigen test    Review of symptoms  Negative for chest pain, palpitations, shortness of breath, abdominal pain.  Has regular bowel function.  No difficulty urinating although is frequent in urgent at times.  No chronic headaches.  Occasional heartburn which she will take Pepcid but not frequent    Examination   Weight 192 lb   Pulse 86   Blood pressure 128/64   HEENT exam no abnormal findings   Neck no thyromegaly no masses   Chest clear breath sounds good effort   Heart regular rate rhythm no murmurs gallops   Abdominal exam nontender, soft, no hepatosplenomegaly abdominal masses  Pulses 2+ carotid pulses no bruits, 2+ pedal pulses  2+ knee ankle jerk reflexes   Negative straight leg raise   No pain with abduction legs at the hip joint   Bilateral tenderness over the greater a trochanter  Tenderness over the radial aspect of the wrist    Impression   General exam   Hypothyroid   Diverticular disease of colon with 2 previous colon resections  Lumbar pain   Bilateral trochanteric bursitis   Wrist tendinitis  Reported penicillin allergy    Plan  Chest x-ray due to reported mold exposure  Urinalysis urine culture  Lab test for COVID antibody and penicillin IgE  Referred to allergy recommend repeat thyroid test in 3 months  Lumbar spine film and recommend referral back to the physical therapy

## 2022-09-13 ENCOUNTER — OFFICE VISIT (OUTPATIENT)
Dept: INTERNAL MEDICINE | Facility: CLINIC | Age: 62
End: 2022-09-13
Payer: COMMERCIAL

## 2022-09-13 ENCOUNTER — LAB VISIT (OUTPATIENT)
Dept: LAB | Facility: HOSPITAL | Age: 62
End: 2022-09-13
Attending: INTERNAL MEDICINE
Payer: COMMERCIAL

## 2022-09-13 VITALS
DIASTOLIC BLOOD PRESSURE: 70 MMHG | SYSTOLIC BLOOD PRESSURE: 140 MMHG | HEART RATE: 90 BPM | OXYGEN SATURATION: 97 % | HEIGHT: 66 IN | BODY MASS INDEX: 30.86 KG/M2 | WEIGHT: 192 LBS

## 2022-09-13 DIAGNOSIS — Z00.00 ANNUAL PHYSICAL EXAM: ICD-10-CM

## 2022-09-13 DIAGNOSIS — M70.62 TROCHANTERIC BURSITIS OF BOTH HIPS: ICD-10-CM

## 2022-09-13 DIAGNOSIS — Z88.0 PENICILLIN ALLERGY: ICD-10-CM

## 2022-09-13 DIAGNOSIS — M77.8 WRIST TENDONITIS: ICD-10-CM

## 2022-09-13 DIAGNOSIS — Z77.120 MOLD EXPOSURE: ICD-10-CM

## 2022-09-13 DIAGNOSIS — K57.90 DIVERTICULAR DISEASE: ICD-10-CM

## 2022-09-13 DIAGNOSIS — E03.9 HYPOTHYROIDISM, UNSPECIFIED TYPE: ICD-10-CM

## 2022-09-13 DIAGNOSIS — M54.50 LUMBAR PAIN: ICD-10-CM

## 2022-09-13 DIAGNOSIS — M70.61 TROCHANTERIC BURSITIS OF BOTH HIPS: ICD-10-CM

## 2022-09-13 DIAGNOSIS — Z00.00 ANNUAL PHYSICAL EXAM: Primary | ICD-10-CM

## 2022-09-13 LAB
BILIRUB UR QL STRIP: NEGATIVE
CLARITY UR REFRACT.AUTO: CLEAR
COLOR UR AUTO: COLORLESS
GLUCOSE UR QL STRIP: NEGATIVE
HGB UR QL STRIP: NEGATIVE
KETONES UR QL STRIP: NEGATIVE
LEUKOCYTE ESTERASE UR QL STRIP: NEGATIVE
NITRITE UR QL STRIP: NEGATIVE
PH UR STRIP: 7 [PH] (ref 5–8)
PROT UR QL STRIP: NEGATIVE
SARS-COV-2 IGG SERPL IA-ACNC: NORMAL AU/ML
SARS-COV-2 IGG SERPL QL IA: POSITIVE
SP GR UR STRIP: 1 (ref 1–1.03)
URN SPEC COLLECT METH UR: ABNORMAL

## 2022-09-13 PROCEDURE — 90471 PNEUMOCOCCAL CONJUGATE VACCINE 20-VALENT: ICD-10-PCS | Mod: S$GLB,,, | Performed by: INTERNAL MEDICINE

## 2022-09-13 PROCEDURE — 1159F MED LIST DOCD IN RCRD: CPT | Mod: CPTII,S$GLB,, | Performed by: INTERNAL MEDICINE

## 2022-09-13 PROCEDURE — 3008F PR BODY MASS INDEX (BMI) DOCUMENTED: ICD-10-PCS | Mod: CPTII,S$GLB,, | Performed by: INTERNAL MEDICINE

## 2022-09-13 PROCEDURE — 99396 PREV VISIT EST AGE 40-64: CPT | Mod: 25,S$GLB,, | Performed by: INTERNAL MEDICINE

## 2022-09-13 PROCEDURE — 3078F PR MOST RECENT DIASTOLIC BLOOD PRESSURE < 80 MM HG: ICD-10-PCS | Mod: CPTII,S$GLB,, | Performed by: INTERNAL MEDICINE

## 2022-09-13 PROCEDURE — 81003 URINALYSIS AUTO W/O SCOPE: CPT | Performed by: INTERNAL MEDICINE

## 2022-09-13 PROCEDURE — 3008F BODY MASS INDEX DOCD: CPT | Mod: CPTII,S$GLB,, | Performed by: INTERNAL MEDICINE

## 2022-09-13 PROCEDURE — 3044F HG A1C LEVEL LT 7.0%: CPT | Mod: CPTII,S$GLB,, | Performed by: INTERNAL MEDICINE

## 2022-09-13 PROCEDURE — 99396 PR PREVENTIVE VISIT,EST,40-64: ICD-10-PCS | Mod: 25,S$GLB,, | Performed by: INTERNAL MEDICINE

## 2022-09-13 PROCEDURE — 90471 IMMUNIZATION ADMIN: CPT | Mod: S$GLB,,, | Performed by: INTERNAL MEDICINE

## 2022-09-13 PROCEDURE — 99999 PR PBB SHADOW E&M-EST. PATIENT-LVL V: ICD-10-PCS | Mod: PBBFAC,,, | Performed by: INTERNAL MEDICINE

## 2022-09-13 PROCEDURE — 99999 PR PBB SHADOW E&M-EST. PATIENT-LVL V: CPT | Mod: PBBFAC,,, | Performed by: INTERNAL MEDICINE

## 2022-09-13 PROCEDURE — 3077F SYST BP >= 140 MM HG: CPT | Mod: CPTII,S$GLB,, | Performed by: INTERNAL MEDICINE

## 2022-09-13 PROCEDURE — 86003 ALLG SPEC IGE CRUDE XTRC EA: CPT | Performed by: INTERNAL MEDICINE

## 2022-09-13 PROCEDURE — 90677 PNEUMOCOCCAL CONJUGATE VACCINE 20-VALENT: ICD-10-PCS | Mod: S$GLB,,, | Performed by: INTERNAL MEDICINE

## 2022-09-13 PROCEDURE — 1159F PR MEDICATION LIST DOCUMENTED IN MEDICAL RECORD: ICD-10-PCS | Mod: CPTII,S$GLB,, | Performed by: INTERNAL MEDICINE

## 2022-09-13 PROCEDURE — 3044F PR MOST RECENT HEMOGLOBIN A1C LEVEL <7.0%: ICD-10-PCS | Mod: CPTII,S$GLB,, | Performed by: INTERNAL MEDICINE

## 2022-09-13 PROCEDURE — 86769 SARS-COV-2 COVID-19 ANTIBODY: CPT | Performed by: INTERNAL MEDICINE

## 2022-09-13 PROCEDURE — 3078F DIAST BP <80 MM HG: CPT | Mod: CPTII,S$GLB,, | Performed by: INTERNAL MEDICINE

## 2022-09-13 PROCEDURE — 90677 PCV20 VACCINE IM: CPT | Mod: S$GLB,,, | Performed by: INTERNAL MEDICINE

## 2022-09-13 PROCEDURE — 3077F PR MOST RECENT SYSTOLIC BLOOD PRESSURE >= 140 MM HG: ICD-10-PCS | Mod: CPTII,S$GLB,, | Performed by: INTERNAL MEDICINE

## 2022-09-14 ENCOUNTER — PATIENT MESSAGE (OUTPATIENT)
Dept: INTERNAL MEDICINE | Facility: CLINIC | Age: 62
End: 2022-09-14
Payer: COMMERCIAL

## 2022-09-16 LAB
ALLERGEN PENICILLIN G  IGE: <0.1 KU/L
PENICILLIN G CLASS: NORMAL

## 2022-09-17 ENCOUNTER — PATIENT MESSAGE (OUTPATIENT)
Dept: INTERNAL MEDICINE | Facility: CLINIC | Age: 62
End: 2022-09-17
Payer: COMMERCIAL

## 2022-09-17 ENCOUNTER — DOCUMENTATION ONLY (OUTPATIENT)
Dept: INTERNAL MEDICINE | Facility: CLINIC | Age: 62
End: 2022-09-17
Payer: COMMERCIAL

## 2022-09-17 DIAGNOSIS — E03.9 HYPOTHYROIDISM, UNSPECIFIED TYPE: Primary | ICD-10-CM

## 2022-09-17 NOTE — PROGRESS NOTES
Diverticulitis   1. 9/2014: Surgery - Lap segmental resection of sigmoid cancer  2. Complicated by anastomotic leak and subsequent stricture managed conservatively with IR drain and repeat balloon dilations  3. 12/7/15: Robo lap exploration, laparoscopic hand-assisted redo low anterior resection, hand assisted omental advancement flap, cysto and stents

## 2022-10-04 ENCOUNTER — PATIENT MESSAGE (OUTPATIENT)
Dept: INTERNAL MEDICINE | Facility: CLINIC | Age: 62
End: 2022-10-04
Payer: COMMERCIAL

## 2022-10-04 DIAGNOSIS — M25.531 WRIST PAIN, CHRONIC, RIGHT: ICD-10-CM

## 2022-10-04 DIAGNOSIS — M25.532 WRIST PAIN, CHRONIC, LEFT: Primary | ICD-10-CM

## 2022-10-04 DIAGNOSIS — G89.29 WRIST PAIN, CHRONIC, RIGHT: ICD-10-CM

## 2022-10-04 DIAGNOSIS — G89.29 WRIST PAIN, CHRONIC, LEFT: Primary | ICD-10-CM

## 2022-10-20 ENCOUNTER — PATIENT MESSAGE (OUTPATIENT)
Dept: INTERNAL MEDICINE | Facility: CLINIC | Age: 62
End: 2022-10-20

## 2022-10-20 ENCOUNTER — OFFICE VISIT (OUTPATIENT)
Dept: INTERNAL MEDICINE | Facility: CLINIC | Age: 62
End: 2022-10-20
Payer: COMMERCIAL

## 2022-10-20 VITALS
HEART RATE: 87 BPM | HEIGHT: 66 IN | SYSTOLIC BLOOD PRESSURE: 128 MMHG | WEIGHT: 191.13 LBS | DIASTOLIC BLOOD PRESSURE: 70 MMHG | BODY MASS INDEX: 30.72 KG/M2 | OXYGEN SATURATION: 99 %

## 2022-10-20 DIAGNOSIS — J02.9 PHARYNGITIS, UNSPECIFIED ETIOLOGY: Primary | ICD-10-CM

## 2022-10-20 PROCEDURE — 96372 PR INJECTION,THERAP/PROPH/DIAG2ST, IM OR SUBCUT: ICD-10-PCS | Mod: S$GLB,,, | Performed by: INTERNAL MEDICINE

## 2022-10-20 PROCEDURE — 3044F PR MOST RECENT HEMOGLOBIN A1C LEVEL <7.0%: ICD-10-PCS | Mod: CPTII,S$GLB,, | Performed by: INTERNAL MEDICINE

## 2022-10-20 PROCEDURE — 1159F PR MEDICATION LIST DOCUMENTED IN MEDICAL RECORD: ICD-10-PCS | Mod: CPTII,S$GLB,, | Performed by: INTERNAL MEDICINE

## 2022-10-20 PROCEDURE — 87070 CULTURE OTHR SPECIMN AEROBIC: CPT | Performed by: INTERNAL MEDICINE

## 2022-10-20 PROCEDURE — 99213 OFFICE O/P EST LOW 20 MIN: CPT | Mod: 25,S$GLB,, | Performed by: INTERNAL MEDICINE

## 2022-10-20 PROCEDURE — 3008F PR BODY MASS INDEX (BMI) DOCUMENTED: ICD-10-PCS | Mod: CPTII,S$GLB,, | Performed by: INTERNAL MEDICINE

## 2022-10-20 PROCEDURE — 3044F HG A1C LEVEL LT 7.0%: CPT | Mod: CPTII,S$GLB,, | Performed by: INTERNAL MEDICINE

## 2022-10-20 PROCEDURE — 99999 PR PBB SHADOW E&M-EST. PATIENT-LVL IV: CPT | Mod: PBBFAC,,, | Performed by: INTERNAL MEDICINE

## 2022-10-20 PROCEDURE — 1160F RVW MEDS BY RX/DR IN RCRD: CPT | Mod: CPTII,S$GLB,, | Performed by: INTERNAL MEDICINE

## 2022-10-20 PROCEDURE — 3074F SYST BP LT 130 MM HG: CPT | Mod: CPTII,S$GLB,, | Performed by: INTERNAL MEDICINE

## 2022-10-20 PROCEDURE — 3008F BODY MASS INDEX DOCD: CPT | Mod: CPTII,S$GLB,, | Performed by: INTERNAL MEDICINE

## 2022-10-20 PROCEDURE — 1159F MED LIST DOCD IN RCRD: CPT | Mod: CPTII,S$GLB,, | Performed by: INTERNAL MEDICINE

## 2022-10-20 PROCEDURE — 1160F PR REVIEW ALL MEDS BY PRESCRIBER/CLIN PHARMACIST DOCUMENTED: ICD-10-PCS | Mod: CPTII,S$GLB,, | Performed by: INTERNAL MEDICINE

## 2022-10-20 PROCEDURE — 3074F PR MOST RECENT SYSTOLIC BLOOD PRESSURE < 130 MM HG: ICD-10-PCS | Mod: CPTII,S$GLB,, | Performed by: INTERNAL MEDICINE

## 2022-10-20 PROCEDURE — 3078F PR MOST RECENT DIASTOLIC BLOOD PRESSURE < 80 MM HG: ICD-10-PCS | Mod: CPTII,S$GLB,, | Performed by: INTERNAL MEDICINE

## 2022-10-20 PROCEDURE — 3078F DIAST BP <80 MM HG: CPT | Mod: CPTII,S$GLB,, | Performed by: INTERNAL MEDICINE

## 2022-10-20 PROCEDURE — 99213 PR OFFICE/OUTPT VISIT, EST, LEVL III, 20-29 MIN: ICD-10-PCS | Mod: 25,S$GLB,, | Performed by: INTERNAL MEDICINE

## 2022-10-20 PROCEDURE — 99999 PR PBB SHADOW E&M-EST. PATIENT-LVL IV: ICD-10-PCS | Mod: PBBFAC,,, | Performed by: INTERNAL MEDICINE

## 2022-10-20 PROCEDURE — 96372 THER/PROPH/DIAG INJ SC/IM: CPT | Mod: S$GLB,,, | Performed by: INTERNAL MEDICINE

## 2022-10-20 PROCEDURE — 87147 CULTURE TYPE IMMUNOLOGIC: CPT | Performed by: INTERNAL MEDICINE

## 2022-10-20 RX ORDER — AZITHROMYCIN 500 MG/1
500 TABLET, FILM COATED ORAL DAILY
Qty: 7 TABLET | Refills: 0 | Status: SHIPPED | OUTPATIENT
Start: 2022-10-20 | End: 2022-10-27

## 2022-10-20 RX ORDER — ALBUTEROL SULFATE 90 UG/1
AEROSOL, METERED RESPIRATORY (INHALATION)
COMMUNITY
Start: 2022-10-15 | End: 2023-05-12

## 2022-10-20 RX ORDER — IPRATROPIUM BROMIDE 42 UG/1
2 SPRAY, METERED NASAL 3 TIMES DAILY
COMMUNITY
Start: 2022-10-16 | End: 2022-10-25

## 2022-10-20 RX ORDER — TRIAMCINOLONE ACETONIDE 40 MG/ML
40 INJECTION, SUSPENSION INTRA-ARTICULAR; INTRAMUSCULAR
Status: COMPLETED | OUTPATIENT
Start: 2022-10-20 | End: 2022-10-20

## 2022-10-20 RX ADMIN — TRIAMCINOLONE ACETONIDE 40 MG: 40 INJECTION, SUSPENSION INTRA-ARTICULAR; INTRAMUSCULAR at 04:10

## 2022-10-20 NOTE — PROGRESS NOTES
MEDICAL HISTORY:  Hypothyroid disease.  Fatty liver with focal nodular hyperplasia.  Anxiety disorder.  Cholecystectomy with pancreatitis.   x3.  Abdominal hernia repair.  Diverticula disease of the colon, for which she had in , a sigmoid colectomy due to diverticulitis, then a stent put in a month later because of microperforation. In 2015, she underwent a balloon dilatation due to stenosis of the colonic anastomosis        Oncology History Overview Note   Diverticulitis   1. 2014: Surgery - Lap segmental resection of sigmoid cancer  2. Complicated by anastomotic leak and subsequent stricture managed conservatively with IR drain and repeat balloon dilations  3. 12/7/15: Robo lap exploration, laparoscopic hand-assisted redo low anterior resection, hand assisted omental advancement flap, cysto and stents     SMedications  Xanax 0.5 mg 5 times daily   cytomel 25 mcg half a tablet daily   Levothyroxine 0.088 mg daily   Over-the-counter Pepcid as needed      62-year-old female     Last week patient started feeling bad, feeling weak.  On Saturday October 15 went urgent care when she was having a sore throat and ears popping.  She was prescribed Zithromax Z-Rito, Atrovent, albuterol.  At  she was getting better but since yesterday he has been having a sore throat again with postnasal drip, initially she was coughing up yellow mucus but now appears to be clear.    Is been no fever chills.    She was tested for COVID strep flu all being negative    Examination   Weight 191   Pulse 88   Blood pressure 28/70   Right tympanic membrane normal   Left tympanic membranes hyperemic   Throat is edematous  Neck bilateral adenopathy   Chest clear breath sounds    Impression  Persistent pharyngitis      Throat culture  Azithromycin 500 mg once a day for 5 days can take over-the-counter Allegra Zyrtec

## 2022-10-21 ENCOUNTER — PATIENT MESSAGE (OUTPATIENT)
Dept: INTERNAL MEDICINE | Facility: CLINIC | Age: 62
End: 2022-10-21
Payer: COMMERCIAL

## 2022-10-24 ENCOUNTER — PATIENT MESSAGE (OUTPATIENT)
Dept: INTERNAL MEDICINE | Facility: CLINIC | Age: 62
End: 2022-10-24
Payer: COMMERCIAL

## 2022-10-24 ENCOUNTER — DOCUMENTATION ONLY (OUTPATIENT)
Dept: INTERNAL MEDICINE | Facility: CLINIC | Age: 62
End: 2022-10-24
Payer: COMMERCIAL

## 2022-10-24 LAB — BACTERIA THROAT CULT: ABNORMAL

## 2022-10-24 NOTE — PROGRESS NOTES
Throat culture noted Streptococcus group F.    She is feeling better with the use is Zithromax Z-Rito and a Kenalog injection..  No sore throat and she does not feel the submandibular swollen glands as much      An appointment was set up with allergy to evaluate for penicillin allergy    Many years ago she reported having a cephalosporin, she can not remember the name, knew it was a cephalosporin, and later had swollen eyes.      Also she expresses a concern for recurring infections.  She is had pneumococcal 23 then later Prevnar 20 in September 2022.  In the past streptococcal serotypes look fine and this occurred after having the Prevnar 23

## 2022-10-25 ENCOUNTER — LAB VISIT (OUTPATIENT)
Dept: LAB | Facility: HOSPITAL | Age: 62
End: 2022-10-25
Attending: INTERNAL MEDICINE
Payer: COMMERCIAL

## 2022-10-25 ENCOUNTER — OFFICE VISIT (OUTPATIENT)
Dept: INTERNAL MEDICINE | Facility: CLINIC | Age: 62
End: 2022-10-25
Payer: COMMERCIAL

## 2022-10-25 VITALS
BODY MASS INDEX: 30.37 KG/M2 | HEART RATE: 78 BPM | HEIGHT: 66 IN | OXYGEN SATURATION: 97 % | WEIGHT: 189 LBS | SYSTOLIC BLOOD PRESSURE: 126 MMHG | DIASTOLIC BLOOD PRESSURE: 76 MMHG

## 2022-10-25 DIAGNOSIS — J02.9 PHARYNGITIS, UNSPECIFIED ETIOLOGY: ICD-10-CM

## 2022-10-25 DIAGNOSIS — R09.82 PND (POST-NASAL DRIP): ICD-10-CM

## 2022-10-25 DIAGNOSIS — J02.9 PHARYNGITIS, UNSPECIFIED ETIOLOGY: Primary | ICD-10-CM

## 2022-10-25 LAB
BASOPHILS # BLD AUTO: 0.02 K/UL (ref 0–0.2)
BASOPHILS NFR BLD: 0.3 % (ref 0–1.9)
DIFFERENTIAL METHOD: NORMAL
EOSINOPHIL # BLD AUTO: 0.1 K/UL (ref 0–0.5)
EOSINOPHIL NFR BLD: 0.8 % (ref 0–8)
ERYTHROCYTE [DISTWIDTH] IN BLOOD BY AUTOMATED COUNT: 12.1 % (ref 11.5–14.5)
HCT VFR BLD AUTO: 40.3 % (ref 37–48.5)
HETEROPH AB SERPL QL IA: NEGATIVE
HGB BLD-MCNC: 13.1 G/DL (ref 12–16)
IGE SERPL-ACNC: <35 IU/ML (ref 0–100)
IMM GRANULOCYTES # BLD AUTO: 0.01 K/UL (ref 0–0.04)
IMM GRANULOCYTES NFR BLD AUTO: 0.1 % (ref 0–0.5)
LYMPHOCYTES # BLD AUTO: 2.1 K/UL (ref 1–4.8)
LYMPHOCYTES NFR BLD: 26.1 % (ref 18–48)
MCH RBC QN AUTO: 28.9 PG (ref 27–31)
MCHC RBC AUTO-ENTMCNC: 32.5 G/DL (ref 32–36)
MCV RBC AUTO: 89 FL (ref 82–98)
MONOCYTES # BLD AUTO: 0.5 K/UL (ref 0.3–1)
MONOCYTES NFR BLD: 6 % (ref 4–15)
NEUTROPHILS # BLD AUTO: 5.3 K/UL (ref 1.8–7.7)
NEUTROPHILS NFR BLD: 66.7 % (ref 38–73)
NRBC BLD-RTO: 0 /100 WBC
PLATELET # BLD AUTO: 318 K/UL (ref 150–450)
PMV BLD AUTO: 9.9 FL (ref 9.2–12.9)
RBC # BLD AUTO: 4.53 M/UL (ref 4–5.4)
WBC # BLD AUTO: 7.86 K/UL (ref 3.9–12.7)

## 2022-10-25 PROCEDURE — 1159F MED LIST DOCD IN RCRD: CPT | Mod: CPTII,S$GLB,, | Performed by: INTERNAL MEDICINE

## 2022-10-25 PROCEDURE — 87070 CULTURE OTHR SPECIMN AEROBIC: CPT | Performed by: INTERNAL MEDICINE

## 2022-10-25 PROCEDURE — 3074F SYST BP LT 130 MM HG: CPT | Mod: CPTII,S$GLB,, | Performed by: INTERNAL MEDICINE

## 2022-10-25 PROCEDURE — 3044F HG A1C LEVEL LT 7.0%: CPT | Mod: CPTII,S$GLB,, | Performed by: INTERNAL MEDICINE

## 2022-10-25 PROCEDURE — 1159F PR MEDICATION LIST DOCUMENTED IN MEDICAL RECORD: ICD-10-PCS | Mod: CPTII,S$GLB,, | Performed by: INTERNAL MEDICINE

## 2022-10-25 PROCEDURE — 99999 PR PBB SHADOW E&M-EST. PATIENT-LVL V: ICD-10-PCS | Mod: PBBFAC,,, | Performed by: INTERNAL MEDICINE

## 2022-10-25 PROCEDURE — 3078F DIAST BP <80 MM HG: CPT | Mod: CPTII,S$GLB,, | Performed by: INTERNAL MEDICINE

## 2022-10-25 PROCEDURE — 86308 HETEROPHILE ANTIBODY SCREEN: CPT | Performed by: INTERNAL MEDICINE

## 2022-10-25 PROCEDURE — 99214 PR OFFICE/OUTPT VISIT, EST, LEVL IV, 30-39 MIN: ICD-10-PCS | Mod: S$GLB,,, | Performed by: INTERNAL MEDICINE

## 2022-10-25 PROCEDURE — 3074F PR MOST RECENT SYSTOLIC BLOOD PRESSURE < 130 MM HG: ICD-10-PCS | Mod: CPTII,S$GLB,, | Performed by: INTERNAL MEDICINE

## 2022-10-25 PROCEDURE — 82785 ASSAY OF IGE: CPT | Performed by: INTERNAL MEDICINE

## 2022-10-25 PROCEDURE — 1160F RVW MEDS BY RX/DR IN RCRD: CPT | Mod: CPTII,S$GLB,, | Performed by: INTERNAL MEDICINE

## 2022-10-25 PROCEDURE — 36415 COLL VENOUS BLD VENIPUNCTURE: CPT | Performed by: INTERNAL MEDICINE

## 2022-10-25 PROCEDURE — 1160F PR REVIEW ALL MEDS BY PRESCRIBER/CLIN PHARMACIST DOCUMENTED: ICD-10-PCS | Mod: CPTII,S$GLB,, | Performed by: INTERNAL MEDICINE

## 2022-10-25 PROCEDURE — 3078F PR MOST RECENT DIASTOLIC BLOOD PRESSURE < 80 MM HG: ICD-10-PCS | Mod: CPTII,S$GLB,, | Performed by: INTERNAL MEDICINE

## 2022-10-25 PROCEDURE — 3044F PR MOST RECENT HEMOGLOBIN A1C LEVEL <7.0%: ICD-10-PCS | Mod: CPTII,S$GLB,, | Performed by: INTERNAL MEDICINE

## 2022-10-25 PROCEDURE — 85025 COMPLETE CBC W/AUTO DIFF WBC: CPT | Performed by: INTERNAL MEDICINE

## 2022-10-25 PROCEDURE — 87147 CULTURE TYPE IMMUNOLOGIC: CPT | Performed by: INTERNAL MEDICINE

## 2022-10-25 PROCEDURE — 99214 OFFICE O/P EST MOD 30 MIN: CPT | Mod: S$GLB,,, | Performed by: INTERNAL MEDICINE

## 2022-10-25 PROCEDURE — 99999 PR PBB SHADOW E&M-EST. PATIENT-LVL V: CPT | Mod: PBBFAC,,, | Performed by: INTERNAL MEDICINE

## 2022-10-25 RX ORDER — PREDNISONE 20 MG/1
20 TABLET ORAL DAILY
Qty: 4 TABLET | Refills: 0 | Status: SHIPPED | OUTPATIENT
Start: 2022-10-25 | End: 2022-10-29

## 2022-10-25 NOTE — PROGRESS NOTES
62-year-old female       Was seen October 20th for upper respiratory infection/pharyngitis.  Zithromax Z-Rito was prescribed related azithromycin 500 mg 1 so day for 7 days was added.    She had a throat culture eventually grew out Streptococcus group F    In overall sense she feels better but there are times when she still feels bad, relieved with the use of Aleve.  Stools aware of a postnasal drip, swollen glands in the neck.  In some nasal congestion.  Occasional cough    She has upcoming appointment in November to me with allergy discussed the concern of penicillin allergy.  Says not totally certain if this is the case.    Examination   Vital signs per epic   Tympanic membranes normal   Nasal mucosa school clear but swollen in the left nostril  Oropharynx is edematous and slightly hyperemic  Palpable enlarged left submandibular lymph gland  Chest clear breath sounds    Impression  Persistent postnasal drip  Pharyngitis    Plan repeat throat culture.  CBC IgE level Monospot   Prednisone 20 mg once a day for 4 days

## 2022-10-26 ENCOUNTER — PATIENT MESSAGE (OUTPATIENT)
Dept: INTERNAL MEDICINE | Facility: CLINIC | Age: 62
End: 2022-10-26
Payer: COMMERCIAL

## 2022-10-28 ENCOUNTER — PATIENT MESSAGE (OUTPATIENT)
Dept: INTERNAL MEDICINE | Facility: CLINIC | Age: 62
End: 2022-10-28
Payer: COMMERCIAL

## 2022-10-28 LAB — BACTERIA THROAT CULT: NORMAL

## 2022-11-02 ENCOUNTER — HOSPITAL ENCOUNTER (OUTPATIENT)
Dept: RADIOLOGY | Facility: HOSPITAL | Age: 62
Discharge: HOME | End: 2022-11-02
Attending: INTERNAL MEDICINE
Payer: COMMERCIAL

## 2022-11-02 ENCOUNTER — PATIENT MESSAGE (OUTPATIENT)
Dept: INTERNAL MEDICINE | Facility: CLINIC | Age: 62
End: 2022-11-02
Payer: COMMERCIAL

## 2022-11-02 DIAGNOSIS — M54.50 LUMBAR PAIN: ICD-10-CM

## 2022-11-02 DIAGNOSIS — M25.532 WRIST PAIN, CHRONIC, LEFT: ICD-10-CM

## 2022-11-02 DIAGNOSIS — G89.29 WRIST PAIN, CHRONIC, LEFT: ICD-10-CM

## 2022-11-02 DIAGNOSIS — Z77.120 MOLD EXPOSURE: ICD-10-CM

## 2022-11-02 PROCEDURE — 71046 XR CHEST PA AND LATERAL: ICD-10-PCS | Mod: 26,,, | Performed by: RADIOLOGY

## 2022-11-02 PROCEDURE — 73110 X-RAY EXAM OF WRIST: CPT | Mod: TC,LT

## 2022-11-02 PROCEDURE — 71046 X-RAY EXAM CHEST 2 VIEWS: CPT | Mod: 26,,, | Performed by: RADIOLOGY

## 2022-11-02 PROCEDURE — 72100 X-RAY EXAM L-S SPINE 2/3 VWS: CPT | Mod: 26,,, | Performed by: RADIOLOGY

## 2022-11-02 PROCEDURE — 72100 XR LUMBAR SPINE AP AND LATERAL: ICD-10-PCS | Mod: 26,,, | Performed by: RADIOLOGY

## 2022-11-02 PROCEDURE — 72100 X-RAY EXAM L-S SPINE 2/3 VWS: CPT | Mod: TC

## 2022-11-02 PROCEDURE — 73110 XR WRIST COMPLETE 3 VIEWS LEFT: ICD-10-PCS | Mod: 26,LT,, | Performed by: RADIOLOGY

## 2022-11-02 PROCEDURE — 73110 X-RAY EXAM OF WRIST: CPT | Mod: 26,LT,, | Performed by: RADIOLOGY

## 2022-11-02 PROCEDURE — 71046 X-RAY EXAM CHEST 2 VIEWS: CPT | Mod: TC

## 2022-11-03 ENCOUNTER — PATIENT MESSAGE (OUTPATIENT)
Dept: INTERNAL MEDICINE | Facility: CLINIC | Age: 62
End: 2022-11-03
Payer: COMMERCIAL

## 2022-11-17 ENCOUNTER — OFFICE VISIT (OUTPATIENT)
Dept: ALLERGY | Facility: CLINIC | Age: 62
End: 2022-11-17
Payer: COMMERCIAL

## 2022-11-17 ENCOUNTER — LAB VISIT (OUTPATIENT)
Dept: LAB | Facility: HOSPITAL | Age: 62
End: 2022-11-17
Payer: COMMERCIAL

## 2022-11-17 VITALS
SYSTOLIC BLOOD PRESSURE: 138 MMHG | HEIGHT: 66 IN | DIASTOLIC BLOOD PRESSURE: 78 MMHG | WEIGHT: 194.69 LBS | BODY MASS INDEX: 31.29 KG/M2

## 2022-11-17 DIAGNOSIS — J31.0 CHRONIC RHINITIS: ICD-10-CM

## 2022-11-17 DIAGNOSIS — Z88.0 PENICILLIN ALLERGY: ICD-10-CM

## 2022-11-17 DIAGNOSIS — Z88.9 DRUG ALLERGY: Primary | ICD-10-CM

## 2022-11-17 PROCEDURE — 3075F SYST BP GE 130 - 139MM HG: CPT | Mod: CPTII,S$GLB,, | Performed by: ALLERGY & IMMUNOLOGY

## 2022-11-17 PROCEDURE — 86003 ALLG SPEC IGE CRUDE XTRC EA: CPT | Performed by: ALLERGY & IMMUNOLOGY

## 2022-11-17 PROCEDURE — 1159F MED LIST DOCD IN RCRD: CPT | Mod: CPTII,S$GLB,, | Performed by: ALLERGY & IMMUNOLOGY

## 2022-11-17 PROCEDURE — 3044F PR MOST RECENT HEMOGLOBIN A1C LEVEL <7.0%: ICD-10-PCS | Mod: CPTII,S$GLB,, | Performed by: ALLERGY & IMMUNOLOGY

## 2022-11-17 PROCEDURE — 99999 PR PBB SHADOW E&M-EST. PATIENT-LVL IV: CPT | Mod: PBBFAC,,, | Performed by: ALLERGY & IMMUNOLOGY

## 2022-11-17 PROCEDURE — 3044F HG A1C LEVEL LT 7.0%: CPT | Mod: CPTII,S$GLB,, | Performed by: ALLERGY & IMMUNOLOGY

## 2022-11-17 PROCEDURE — 86003 ALLG SPEC IGE CRUDE XTRC EA: CPT | Mod: 59 | Performed by: ALLERGY & IMMUNOLOGY

## 2022-11-17 PROCEDURE — 1160F RVW MEDS BY RX/DR IN RCRD: CPT | Mod: CPTII,S$GLB,, | Performed by: ALLERGY & IMMUNOLOGY

## 2022-11-17 PROCEDURE — 3078F DIAST BP <80 MM HG: CPT | Mod: CPTII,S$GLB,, | Performed by: ALLERGY & IMMUNOLOGY

## 2022-11-17 PROCEDURE — 1160F PR REVIEW ALL MEDS BY PRESCRIBER/CLIN PHARMACIST DOCUMENTED: ICD-10-PCS | Mod: CPTII,S$GLB,, | Performed by: ALLERGY & IMMUNOLOGY

## 2022-11-17 PROCEDURE — 99999 PR PBB SHADOW E&M-EST. PATIENT-LVL IV: ICD-10-PCS | Mod: PBBFAC,,, | Performed by: ALLERGY & IMMUNOLOGY

## 2022-11-17 PROCEDURE — 3078F PR MOST RECENT DIASTOLIC BLOOD PRESSURE < 80 MM HG: ICD-10-PCS | Mod: CPTII,S$GLB,, | Performed by: ALLERGY & IMMUNOLOGY

## 2022-11-17 PROCEDURE — 3008F BODY MASS INDEX DOCD: CPT | Mod: CPTII,S$GLB,, | Performed by: ALLERGY & IMMUNOLOGY

## 2022-11-17 PROCEDURE — 3075F PR MOST RECENT SYSTOLIC BLOOD PRESS GE 130-139MM HG: ICD-10-PCS | Mod: CPTII,S$GLB,, | Performed by: ALLERGY & IMMUNOLOGY

## 2022-11-17 PROCEDURE — 1159F PR MEDICATION LIST DOCUMENTED IN MEDICAL RECORD: ICD-10-PCS | Mod: CPTII,S$GLB,, | Performed by: ALLERGY & IMMUNOLOGY

## 2022-11-17 PROCEDURE — 36415 COLL VENOUS BLD VENIPUNCTURE: CPT | Mod: PO | Performed by: ALLERGY & IMMUNOLOGY

## 2022-11-17 PROCEDURE — 3008F PR BODY MASS INDEX (BMI) DOCUMENTED: ICD-10-PCS | Mod: CPTII,S$GLB,, | Performed by: ALLERGY & IMMUNOLOGY

## 2022-11-17 PROCEDURE — 99204 PR OFFICE/OUTPT VISIT, NEW, LEVL IV, 45-59 MIN: ICD-10-PCS | Mod: S$GLB,,, | Performed by: ALLERGY & IMMUNOLOGY

## 2022-11-17 PROCEDURE — 99204 OFFICE O/P NEW MOD 45 MIN: CPT | Mod: S$GLB,,, | Performed by: ALLERGY & IMMUNOLOGY

## 2022-11-17 RX ORDER — DIPHENHYDRAMINE HCL 12.5MG/5ML
ELIXIR ORAL 4 TIMES DAILY PRN
COMMUNITY
End: 2023-03-01

## 2022-11-17 RX ORDER — AMOXICILLIN 500 MG
CAPSULE ORAL DAILY
COMMUNITY

## 2022-11-17 RX ORDER — PNV NO.95/FERROUS FUM/FOLIC AC 28MG-0.8MG
100 TABLET ORAL DAILY
COMMUNITY

## 2022-11-17 RX ORDER — AZELASTINE 1 MG/ML
2 SPRAY, METERED NASAL 2 TIMES DAILY
Qty: 30 ML | Refills: 12 | Status: SHIPPED | OUTPATIENT
Start: 2022-11-17 | End: 2023-05-12

## 2022-11-17 NOTE — PROGRESS NOTES
Subjective:       Patient ID: Abiola Borjas is a 62 y.o. female.    Chief Complaint:  Other (Patient would like to evaluate drug allergies to PCN and cephalosporins )      63 yo woman presents for consult from Dr César Contreras for drug allergy. She states >25 years ago she was given cephalosporin med and only remembers having mild eye swelling of eyelid. No hies or SOB she remembers. Was told to avoid cephalosporins and PCN because closely related. Has been avoiding ever since but would like to know if really allergic. She does have rhinitis. Has PND, occ sneeze, slight sore throat and cough. Trigger by scents, chemicals, smoke and mold. She currently has mold in her attic, being rectified. She takes Claritin or zyrtec daily and benadryl nightly. In October was sick and given steroid shot and z pack then 7 days of Zithromax 500 daily for 7 days to improve. No asthma. No eczema. No food, insect or latex allergy. Has anxiety and diverticulitis. No other medical issues.       Environmental History: see history section for home environment  Review of Systems   Constitutional:  Negative for appetite change, chills, fatigue and fever.   HENT:  Positive for congestion, postnasal drip, sneezing and sore throat. Negative for ear discharge, ear pain, facial swelling, nosebleeds, rhinorrhea, sinus pressure, trouble swallowing and voice change.    Eyes:  Negative for discharge, redness, itching and visual disturbance.   Respiratory:  Positive for cough. Negative for choking, chest tightness, shortness of breath and wheezing.    Cardiovascular:  Negative for chest pain, palpitations and leg swelling.   Gastrointestinal:  Negative for abdominal distention, abdominal pain, constipation, diarrhea, nausea and vomiting.   Genitourinary:  Negative for difficulty urinating.   Musculoskeletal:  Negative for arthralgias, gait problem, joint swelling and myalgias.   Skin:  Negative for color change and rash.   Neurological:  Negative  for dizziness, syncope, weakness, light-headedness and headaches.   Hematological:  Negative for adenopathy. Does not bruise/bleed easily.   Psychiatric/Behavioral:  Negative for agitation, behavioral problems, confusion and sleep disturbance. The patient is not nervous/anxious.       Objective:      Physical Exam  Vitals and nursing note reviewed.   Constitutional:       General: She is not in acute distress.     Appearance: Normal appearance. She is not ill-appearing.   HENT:      Head: Normocephalic and atraumatic.      Right Ear: External ear normal.      Left Ear: External ear normal.      Nose: No rhinorrhea.   Eyes:      General:         Right eye: No discharge.         Left eye: No discharge.      Conjunctiva/sclera: Conjunctivae normal.   Cardiovascular:      Rate and Rhythm: Normal rate and regular rhythm.   Pulmonary:      Effort: Pulmonary effort is normal. No respiratory distress.   Abdominal:      General: There is no distension.   Musculoskeletal:         General: Normal range of motion.      Cervical back: Normal range of motion.   Skin:     General: Skin is warm and dry.      Findings: No erythema or rash.   Neurological:      Mental Status: She is alert and oriented to person, place, and time.   Psychiatric:         Mood and Affect: Mood normal.         Behavior: Behavior normal.         Thought Content: Thought content normal.         Judgment: Judgment normal.       Laboratory:   none performed   Assessment:       1. Drug allergy    2. Penicillin allergy    3. Chronic rhinitis         Plan:       Advised pt history is not very C/w IgE mediated allergy so will plan to evaluate both- PCN skin test and if negative challenge and then graded challenge to cephalosporin. Advised to hold antihistamines for 5 days prior to visit  For rhinitis will send immunocaps to eval triggers and continue H 1 blocker daily and add azelastine 2 SEN BID as needed  Phone review  Dr Contreras notified of completed consult  via Epic

## 2022-11-18 ENCOUNTER — TELEPHONE (OUTPATIENT)
Dept: INTERNAL MEDICINE | Facility: CLINIC | Age: 62
End: 2022-11-18
Payer: COMMERCIAL

## 2022-11-18 ENCOUNTER — OFFICE VISIT (OUTPATIENT)
Dept: INTERNAL MEDICINE | Facility: CLINIC | Age: 62
End: 2022-11-18
Payer: COMMERCIAL

## 2022-11-18 VITALS
HEIGHT: 65 IN | OXYGEN SATURATION: 98 % | WEIGHT: 192.25 LBS | BODY MASS INDEX: 32.03 KG/M2 | SYSTOLIC BLOOD PRESSURE: 114 MMHG | HEART RATE: 80 BPM | DIASTOLIC BLOOD PRESSURE: 78 MMHG

## 2022-11-18 DIAGNOSIS — R05.9 COUGH: ICD-10-CM

## 2022-11-18 DIAGNOSIS — J02.9 SORE THROAT: Primary | ICD-10-CM

## 2022-11-18 LAB — SARS-COV-2 RNA RESP QL NAA+PROBE: NOT DETECTED

## 2022-11-18 PROCEDURE — 3044F HG A1C LEVEL LT 7.0%: CPT | Mod: CPTII,S$GLB,, | Performed by: INTERNAL MEDICINE

## 2022-11-18 PROCEDURE — U0005 INFEC AGEN DETEC AMPLI PROBE: HCPCS | Performed by: INTERNAL MEDICINE

## 2022-11-18 PROCEDURE — 3074F PR MOST RECENT SYSTOLIC BLOOD PRESSURE < 130 MM HG: ICD-10-PCS | Mod: CPTII,S$GLB,, | Performed by: INTERNAL MEDICINE

## 2022-11-18 PROCEDURE — 3008F PR BODY MASS INDEX (BMI) DOCUMENTED: ICD-10-PCS | Mod: CPTII,S$GLB,, | Performed by: INTERNAL MEDICINE

## 2022-11-18 PROCEDURE — 3074F SYST BP LT 130 MM HG: CPT | Mod: CPTII,S$GLB,, | Performed by: INTERNAL MEDICINE

## 2022-11-18 PROCEDURE — 99213 PR OFFICE/OUTPT VISIT, EST, LEVL III, 20-29 MIN: ICD-10-PCS | Mod: S$GLB,,, | Performed by: INTERNAL MEDICINE

## 2022-11-18 PROCEDURE — U0003 INFECTIOUS AGENT DETECTION BY NUCLEIC ACID (DNA OR RNA); SEVERE ACUTE RESPIRATORY SYNDROME CORONAVIRUS 2 (SARS-COV-2) (CORONAVIRUS DISEASE [COVID-19]), AMPLIFIED PROBE TECHNIQUE, MAKING USE OF HIGH THROUGHPUT TECHNOLOGIES AS DESCRIBED BY CMS-2020-01-R: HCPCS | Performed by: INTERNAL MEDICINE

## 2022-11-18 PROCEDURE — 99999 PR PBB SHADOW E&M-EST. PATIENT-LVL III: ICD-10-PCS | Mod: PBBFAC,,, | Performed by: INTERNAL MEDICINE

## 2022-11-18 PROCEDURE — 99213 OFFICE O/P EST LOW 20 MIN: CPT | Mod: S$GLB,,, | Performed by: INTERNAL MEDICINE

## 2022-11-18 PROCEDURE — 3008F BODY MASS INDEX DOCD: CPT | Mod: CPTII,S$GLB,, | Performed by: INTERNAL MEDICINE

## 2022-11-18 PROCEDURE — 87070 CULTURE OTHR SPECIMN AEROBIC: CPT | Performed by: INTERNAL MEDICINE

## 2022-11-18 PROCEDURE — 87502 INFLUENZA DNA AMP PROBE: CPT | Performed by: INTERNAL MEDICINE

## 2022-11-18 PROCEDURE — 3044F PR MOST RECENT HEMOGLOBIN A1C LEVEL <7.0%: ICD-10-PCS | Mod: CPTII,S$GLB,, | Performed by: INTERNAL MEDICINE

## 2022-11-18 PROCEDURE — 99999 PR PBB SHADOW E&M-EST. PATIENT-LVL III: CPT | Mod: PBBFAC,,, | Performed by: INTERNAL MEDICINE

## 2022-11-18 PROCEDURE — 3078F DIAST BP <80 MM HG: CPT | Mod: CPTII,S$GLB,, | Performed by: INTERNAL MEDICINE

## 2022-11-18 PROCEDURE — 3078F PR MOST RECENT DIASTOLIC BLOOD PRESSURE < 80 MM HG: ICD-10-PCS | Mod: CPTII,S$GLB,, | Performed by: INTERNAL MEDICINE

## 2022-11-18 RX ORDER — DOXYCYCLINE HYCLATE 100 MG
TABLET ORAL
Qty: 14 TABLET | Refills: 0 | Status: SHIPPED | OUTPATIENT
Start: 2022-11-18 | End: 2023-01-31

## 2022-11-18 RX ORDER — BENZONATATE 200 MG/1
200 CAPSULE ORAL 3 TIMES DAILY PRN
Qty: 30 CAPSULE | Refills: 0 | Status: SHIPPED | OUTPATIENT
Start: 2022-11-18 | End: 2022-11-28

## 2022-11-18 NOTE — TELEPHONE ENCOUNTER
----- Message from Lila Gordon sent at 11/18/2022  9:28 AM CST -----  Contact: [phone]  1MEDICALADVICE     Patient is calling for Medical Advice regarding:Bronchitis     How long has patient had these symptoms:x 1 day    Pharmacy name and phone#:  [facility] #[id number] - [place] [address] AT [facility] & [address]  [place] [address]  Phone: [phone] Fax: [phone]        Would like response via Wattpad:  call    Comments:

## 2022-11-18 NOTE — PROGRESS NOTES
Subjective:       Patient ID: Abiola PIÑA [facility], [place] is a 62 y.o. female.    Chief Complaint: Sore Throat, Nasal Congestion (Whitish nasal drainage since yesterday/House cleaned of mold recently), and Fatigue    Sore Throat   Pertinent negatives include no abdominal pain, diarrhea, headaches, shortness of breath (PND or orthopnea) or vomiting.   Fatigue  Associated symptoms include fatigue and a sore throat. Pertinent negatives include no abdominal pain, chest pain (arm pain or jaw pain), headaches, nausea or vomiting. Pt reports sore throat and congestion.  No fever or chills - traveling for the holiday and wants to be better.  No CP or SOB.  Not much cough.  No GI symptoms.   Review of Systems   Constitutional:  Positive for fatigue.   HENT:  Positive for sore throat.    Respiratory:  Negative for shortness of breath (PND or orthopnea).    Cardiovascular:  Negative for chest pain (arm pain or jaw pain).   Gastrointestinal:  Negative for abdominal pain, diarrhea, nausea and vomiting.   Genitourinary:  Negative for dysuria.   Neurological:  Negative for seizures, syncope and headaches.     Objective:      Physical Exam  Constitutional:       General: She is not in acute distress.     Appearance: She is well-developed.   HENT:      Head: Normocephalic.   Eyes:      Pupils: Pupils are equal, round, and reactive to light.   Neck:      Thyroid: No thyromegaly.      Vascular: No JVD.   Cardiovascular:      Rate and Rhythm: Normal rate and regular rhythm.      Heart sounds: Normal heart sounds. No murmur heard.    No friction rub. No gallop.   Pulmonary:      Effort: Pulmonary effort is normal.      Breath sounds: Normal breath sounds. No wheezing or rales.   Abdominal:      General: Bowel sounds are normal. There is no distension.      Palpations: Abdomen is soft. There is no mass.      Tenderness: There is no abdominal tenderness. There is no guarding or rebound.   Musculoskeletal:      Cervical back: Neck supple.    Lymphadenopathy:      Cervical: No cervical adenopathy.   Skin:     General: Skin is warm and dry.   Neurological:      Mental Status: She is alert and oriented to person, place, and time.      Deep Tendon Reflexes: Reflexes are normal and symmetric.   Psychiatric:         Behavior: Behavior normal.         Thought Content: Thought content normal.         Judgment: Judgment normal.       Assessment:       1. Sore throat          Plan:   Sore throat  -     Influenza A & B by Molecular  -     CULTURE, RESPIRATORY  - THROAT    Other orders  -     doxycycline (VIBRA-TABS) 100 MG tablet; One tablet twice daily with food and a full glass of water  Dispense: 14 tablet; Refill: 0  -     benzonatate (TESSALON) 200 MG capsule; Take 1 capsule (200 mg total) by mouth 3 (three) times daily as needed for Cough.  Dispense: 30 capsule; Refill: 0  -     COVID-19 Routine Screening

## 2022-11-19 ENCOUNTER — PATIENT MESSAGE (OUTPATIENT)
Dept: INTERNAL MEDICINE | Facility: CLINIC | Age: 62
End: 2022-11-19
Payer: COMMERCIAL

## 2022-11-19 LAB
INFLUENZA A, MOLECULAR: NEGATIVE
INFLUENZA B, MOLECULAR: NEGATIVE
SPECIMEN SOURCE: NORMAL

## 2022-11-21 ENCOUNTER — TELEPHONE (OUTPATIENT)
Dept: INTERNAL MEDICINE | Facility: CLINIC | Age: 62
End: 2022-11-21
Payer: COMMERCIAL

## 2022-11-21 ENCOUNTER — PATIENT MESSAGE (OUTPATIENT)
Dept: INTERNAL MEDICINE | Facility: CLINIC | Age: 62
End: 2022-11-21
Payer: COMMERCIAL

## 2022-11-21 LAB
A ALTERNATA IGE QN: <0.1 KU/L
A FUMIGATUS IGE QN: <0.1 KU/L
ALLERGEN CHAETOMIUM GLOBOSUM IGE: <0.1 KU/L
ALLERGEN WALNUT TREE IGE: <0.1 KU/L
ALLERGEN WHITE PINE TREE IGE: <0.1 KU/L
B CINEREA IGE QN: <0.1 KU/L
BACTERIA THROAT CULT: NORMAL
BAHIA GRASS IGE QN: <0.1 KU/L
BALD CYPRESS IGE QN: <0.1 KU/L
BERMUDA GRASS IGE QN: <0.1 KU/L
C HERBARUM IGE QN: <0.1 KU/L
C LUNATA IGE QN: <0.1 KU/L
CAT DANDER IGE QN: <0.1 KU/L
CHAETOMIUM GLOB. CLASS: NORMAL
COMMON RAGWEED IGE QN: <0.1 KU/L
COTTONWOOD IGE QN: <0.1 KU/L
D FARINAE IGE QN: 0.88 KU/L
D PTERONYSS IGE QN: 0.24 KU/L
DEPRECATED A ALTERNATA IGE RAST QL: NORMAL
DEPRECATED A FUMIGATUS IGE RAST QL: NORMAL
DEPRECATED B CINEREA IGE RAST QL: NORMAL
DEPRECATED BAHIA GRASS IGE RAST QL: NORMAL
DEPRECATED BALD CYPRESS IGE RAST QL: NORMAL
DEPRECATED BERMUDA GRASS IGE RAST QL: NORMAL
DEPRECATED C HERBARUM IGE RAST QL: NORMAL
DEPRECATED C LUNATA IGE RAST QL: NORMAL
DEPRECATED CAT DANDER IGE RAST QL: NORMAL
DEPRECATED COMMON RAGWEED IGE RAST QL: NORMAL
DEPRECATED COTTONWOOD IGE RAST QL: NORMAL
DEPRECATED D FARINAE IGE RAST QL: ABNORMAL
DEPRECATED D PTERONYSS IGE RAST QL: ABNORMAL
DEPRECATED DOG DANDER IGE RAST QL: NORMAL
DEPRECATED ELDER IGE RAST QL: NORMAL
DEPRECATED ENGL PLANTAIN IGE RAST QL: NORMAL
DEPRECATED HORSE DANDER IGE RAST QL: NORMAL
DEPRECATED JOHNSON GRASS IGE RAST QL: NORMAL
DEPRECATED LONDON PLANE IGE RAST QL: NORMAL
DEPRECATED MUGWORT IGE RAST QL: NORMAL
DEPRECATED P NOTATUM IGE RAST QL: NORMAL
DEPRECATED PECAN/HICK TREE IGE RAST QL: NORMAL
DEPRECATED ROACH IGE RAST QL: NORMAL
DEPRECATED S ROSTRATA IGE RAST QL: NORMAL
DEPRECATED SALTWORT IGE RAST QL: NORMAL
DEPRECATED SILVER BIRCH IGE RAST QL: NORMAL
DEPRECATED TIMOTHY IGE RAST QL: NORMAL
DEPRECATED WEST RAGWEED IGE RAST QL: NORMAL
DEPRECATED WHITE OAK IGE RAST QL: NORMAL
DEPRECATED WILLOW IGE RAST QL: NORMAL
DOG DANDER IGE QN: <0.1 KU/L
ELDER IGE QN: <0.1 KU/L
ENGL PLANTAIN IGE QN: <0.1 KU/L
HORSE DANDER IGE QN: <0.1 KU/L
JOHNSON GRASS IGE QN: <0.1 KU/L
LONDON PLANE IGE QN: <0.1 KU/L
MUGWORT IGE QN: <0.1 KU/L
P NOTATUM IGE QN: <0.1 KU/L
PECAN/HICK TREE IGE QN: <0.1 KU/L
ROACH IGE QN: <0.1 KU/L
S ROSTRATA IGE QN: <0.1 KU/L
SALTWORT IGE QN: <0.1 KU/L
SILVER BIRCH IGE QN: <0.1 KU/L
TIMOTHY IGE QN: <0.1 KU/L
WALNUT TREE CLASS: NORMAL
WEST RAGWEED IGE QN: <0.1 KU/L
WHITE OAK IGE QN: <0.1 KU/L
WHITE PINE CLASS: NORMAL
WILLOW IGE QN: <0.1 KU/L

## 2022-11-21 NOTE — TELEPHONE ENCOUNTER
----- Message from Ignacia Bridges sent at 11/21/2022 11:31 AM CST -----  Contact: [phone]  Pt is calling she is asking for a steroid shot she states she is leaving to go out of town and would like to know if she can get this before she leaves please give return call

## 2022-11-23 ENCOUNTER — PATIENT MESSAGE (OUTPATIENT)
Dept: ALLERGY | Facility: CLINIC | Age: 62
End: 2022-11-23
Payer: COMMERCIAL

## 2022-12-06 ENCOUNTER — OFFICE VISIT (OUTPATIENT)
Dept: ALLERGY | Facility: CLINIC | Age: 62
End: 2022-12-06
Payer: COMMERCIAL

## 2022-12-06 VITALS
BODY MASS INDEX: 32.03 KG/M2 | OXYGEN SATURATION: 98 % | HEART RATE: 76 BPM | SYSTOLIC BLOOD PRESSURE: 108 MMHG | DIASTOLIC BLOOD PRESSURE: 82 MMHG | HEIGHT: 65 IN | WEIGHT: 192.25 LBS

## 2022-12-06 DIAGNOSIS — J30.89 ALLERGIC RHINITIS DUE TO DUST MITE: ICD-10-CM

## 2022-12-06 DIAGNOSIS — Z88.9 DRUG ALLERGY: ICD-10-CM

## 2022-12-06 DIAGNOSIS — Z88.0 PENICILLIN ALLERGY: Primary | ICD-10-CM

## 2022-12-06 PROCEDURE — 95076 PR INGESTION CHALLENGE TEST; INITIAL 120 MIN: ICD-10-PCS | Mod: S$GLB,,, | Performed by: ALLERGY & IMMUNOLOGY

## 2022-12-06 PROCEDURE — 1159F MED LIST DOCD IN RCRD: CPT | Mod: CPTII,S$GLB,, | Performed by: ALLERGY & IMMUNOLOGY

## 2022-12-06 PROCEDURE — 95018 PR PERQ&IC ALLG TEST DRUGS/BIOL: ICD-10-PCS | Mod: S$GLB,,, | Performed by: ALLERGY & IMMUNOLOGY

## 2022-12-06 PROCEDURE — 3044F HG A1C LEVEL LT 7.0%: CPT | Mod: CPTII,S$GLB,, | Performed by: ALLERGY & IMMUNOLOGY

## 2022-12-06 PROCEDURE — 99999 PR PBB SHADOW E&M-EST. PATIENT-LVL III: ICD-10-PCS | Mod: PBBFAC,,, | Performed by: ALLERGY & IMMUNOLOGY

## 2022-12-06 PROCEDURE — 99214 OFFICE O/P EST MOD 30 MIN: CPT | Mod: 25,S$GLB,, | Performed by: ALLERGY & IMMUNOLOGY

## 2022-12-06 PROCEDURE — 3074F PR MOST RECENT SYSTOLIC BLOOD PRESSURE < 130 MM HG: ICD-10-PCS | Mod: CPTII,S$GLB,, | Performed by: ALLERGY & IMMUNOLOGY

## 2022-12-06 PROCEDURE — 1159F PR MEDICATION LIST DOCUMENTED IN MEDICAL RECORD: ICD-10-PCS | Mod: CPTII,S$GLB,, | Performed by: ALLERGY & IMMUNOLOGY

## 2022-12-06 PROCEDURE — 3079F DIAST BP 80-89 MM HG: CPT | Mod: CPTII,S$GLB,, | Performed by: ALLERGY & IMMUNOLOGY

## 2022-12-06 PROCEDURE — 1160F RVW MEDS BY RX/DR IN RCRD: CPT | Mod: CPTII,S$GLB,, | Performed by: ALLERGY & IMMUNOLOGY

## 2022-12-06 PROCEDURE — 3008F BODY MASS INDEX DOCD: CPT | Mod: CPTII,S$GLB,, | Performed by: ALLERGY & IMMUNOLOGY

## 2022-12-06 PROCEDURE — 95076 INGEST CHALLENGE INI 120 MIN: CPT | Mod: S$GLB,,, | Performed by: ALLERGY & IMMUNOLOGY

## 2022-12-06 PROCEDURE — 1160F PR REVIEW ALL MEDS BY PRESCRIBER/CLIN PHARMACIST DOCUMENTED: ICD-10-PCS | Mod: CPTII,S$GLB,, | Performed by: ALLERGY & IMMUNOLOGY

## 2022-12-06 PROCEDURE — 3044F PR MOST RECENT HEMOGLOBIN A1C LEVEL <7.0%: ICD-10-PCS | Mod: CPTII,S$GLB,, | Performed by: ALLERGY & IMMUNOLOGY

## 2022-12-06 PROCEDURE — 3074F SYST BP LT 130 MM HG: CPT | Mod: CPTII,S$GLB,, | Performed by: ALLERGY & IMMUNOLOGY

## 2022-12-06 PROCEDURE — 95018 ALL TSTG PERQ&IQ DRUGS/BIOL: CPT | Mod: S$GLB,,, | Performed by: ALLERGY & IMMUNOLOGY

## 2022-12-06 PROCEDURE — 3008F PR BODY MASS INDEX (BMI) DOCUMENTED: ICD-10-PCS | Mod: CPTII,S$GLB,, | Performed by: ALLERGY & IMMUNOLOGY

## 2022-12-06 PROCEDURE — 3079F PR MOST RECENT DIASTOLIC BLOOD PRESSURE 80-89 MM HG: ICD-10-PCS | Mod: CPTII,S$GLB,, | Performed by: ALLERGY & IMMUNOLOGY

## 2022-12-06 PROCEDURE — 99214 PR OFFICE/OUTPT VISIT, EST, LEVL IV, 30-39 MIN: ICD-10-PCS | Mod: 25,S$GLB,, | Performed by: ALLERGY & IMMUNOLOGY

## 2022-12-06 PROCEDURE — 99999 PR PBB SHADOW E&M-EST. PATIENT-LVL III: CPT | Mod: PBBFAC,,, | Performed by: ALLERGY & IMMUNOLOGY

## 2022-12-06 NOTE — PATIENT INSTRUCTIONS
allergy test shows you are allergic to dust mites. Dust mites are microscopic insects, so small you cant see them and they cant bite but they live in our beds, carpet and upholstered furniture. They have nothing to do with cleanliness. We are most exposed by our beds, at night breathe this allergen in all night. You would benefit from Dust mite avoidance - zippered covers over pillows, mattress and box springs.  Can purchase at [employer], [employer], [employer], or www.[employer].   Place sheets over allergy covers   Wash sheets in hot water weekly

## 2022-12-06 NOTE — PROGRESS NOTES
Subjective:       Patient ID: Abiola PIÑA [facility], [place] is a 62 y.o. female.    Chief Complaint:  No chief complaint on file.      63 yo woman presents for continued evaluation of PCN allergy, drug allergy and allergic rhinitis due to dust mites. She was last seen 11/17/2022. Did have immunocaps then with positive to dust mites. She is on H 1 blocker daily and azelastine 2 SNE BID as needed. She was to hold for 5 days for PCN test today but stopped only 3 days ago.     Prior History taken 11/17/2022: consult from Dr César Contreras for drug allergy. She states >25 years ago she was given cephalosporin med and only remembers having mild eye swelling of eyelid. No hies or SOB she remembers. Was told to avoid cephalosporins and PCN because closely related. Has been avoiding ever since but would like to know if really allergic. She does have rhinitis. Has PND, occ sneeze, slight sore throat and cough. Trigger by scents, chemicals, smoke and mold. She currently has mold in her attic, being rectified. She takes Claritin or zyrtec daily and benadryl nightly. In October was sick and given steroid shot and z pack then 7 days of Zithromax 500 daily for 7 days to improve. No asthma. No eczema. No food, insect or latex allergy. Has anxiety and diverticulitis. No other medical issues.       Environmental History: see history section for home environment  Review of Systems   Constitutional:  Negative for appetite change, chills, fatigue and fever.   HENT:  Positive for congestion, postnasal drip, sneezing and sore throat. Negative for ear discharge, ear pain, facial swelling, nosebleeds, rhinorrhea, sinus pressure, trouble swallowing and voice change.    Eyes:  Negative for discharge, redness, itching and visual disturbance.   Respiratory:  Positive for cough. Negative for choking, chest tightness, shortness of breath and wheezing.    Cardiovascular:  Negative for chest pain, palpitations and leg swelling.   Gastrointestinal:  Negative for  abdominal distention, abdominal pain, constipation, diarrhea, nausea and vomiting.   Genitourinary:  Negative for difficulty urinating.   Musculoskeletal:  Negative for arthralgias, gait problem, joint swelling and myalgias.   Skin:  Negative for color change and rash.   Neurological:  Negative for dizziness, syncope, weakness, light-headedness and headaches.   Hematological:  Negative for adenopathy. Does not bruise/bleed easily.   Psychiatric/Behavioral:  Negative for agitation, behavioral problems, confusion and sleep disturbance. The patient is not nervous/anxious.       Objective:      Physical Exam  Vitals and nursing note reviewed.   Constitutional:       General: She is not in acute distress.     Appearance: Normal appearance. She is not ill-appearing.   HENT:      Head: Normocephalic and atraumatic.      Right Ear: External ear normal.      Left Ear: External ear normal.      Nose: No rhinorrhea.   Eyes:      General:         Right eye: No discharge.         Left eye: No discharge.      Conjunctiva/sclera: Conjunctivae normal.   Cardiovascular:      Rate and Rhythm: Normal rate and regular rhythm.   Pulmonary:      Effort: Pulmonary effort is normal. No respiratory distress.   Abdominal:      General: There is no distension.   Musculoskeletal:         General: Normal range of motion.      Cervical back: Normal range of motion.   Skin:     General: Skin is warm and dry.      Findings: No erythema or rash.   Neurological:      Mental Status: She is alert and oriented to person, place, and time.   Psychiatric:         Mood and Affect: Mood normal.         Behavior: Behavior normal.         Thought Content: Thought content normal.         Judgment: Judgment normal.       Laboratory:   none performed     Skin testing with penicillin: This type of testing assesses the risk for an IgE-mediated, immediate-type hypersensitivity reaction following administration of penicillin. The spectrum of symptoms that this test  will assess the risk for includes: urticaria, angioedema, laryngeal edema, bronchospasm and shock. This test does not assess the risk for symptoms produced by non-immunologic mechanisms or other immunologic mechanisms including serum sickness, interstitial nephritis, immunologic cytopenias, Payne-Surinder syndrome, erythema multiforme, toxic epidermal necrolysis, etc.    Prick test at 1345 with 3+ histamine and negative prepen, penG, and saline   Intradermals done at 1400 with 3+ histamine and negative prepen, penG, and saline    Amoxicillin challenge  Total time: 70 minutes  The risks and benefits of an ingestion challenge with amoxicillin was reviewed with the patient. After verbal consent was obtained, 500 mg of amoxicillin PO was administered. The patient was observed for an hour with no change in physical exam.    Results:  No immediate type hypersensitivity to amoxicillin  Interpretation: The patient has no evidence of immediate-type hypersensitivity to amoxicillin, and should be able to tolerate penicillin-class antibiotics without an increased risk of anaphylactic reaction. The patient was counseled that delayed-type allergy may take more than 24 hours to develop. While these reactions are not life-threatening, please call the on-call allergy fellow in the event that any symptoms develop.     Assessment:       1. Penicillin allergy    2. Drug allergy    3. Allergic rhinitis due to dust mite         Plan:       The patient has no evidence of immediate-type hypersensitivity to amoxicillin, and should be able to tolerate penicillin-class antibiotics without an increased risk of anaphylactic reaction. The patient was counseled that delayed-type allergy may take more than 24 hours to develop. While these reactions are not life-threatening, please call the on-call allergy fellow in the event that any symptoms develop. Note: this does not determine the patient's risk for future development of penicillin-specific  IgE  For rhinitis  continue fluticasone 2 SEN daily, zyrtec daily and azelastine 2 SEN BID as needed  Dust mite avoidance, measures discussed and handout provided.  RTC for cephalsporin skin test and graded challenge

## 2022-12-09 ENCOUNTER — OFFICE VISIT (OUTPATIENT)
Dept: ALLERGY | Facility: CLINIC | Age: 62
End: 2022-12-09
Payer: COMMERCIAL

## 2022-12-09 VITALS
WEIGHT: 192.25 LBS | BODY MASS INDEX: 32.03 KG/M2 | OXYGEN SATURATION: 98 % | HEART RATE: 85 BPM | DIASTOLIC BLOOD PRESSURE: 78 MMHG | HEIGHT: 65 IN | SYSTOLIC BLOOD PRESSURE: 118 MMHG

## 2022-12-09 DIAGNOSIS — J30.89 ALLERGIC RHINITIS DUE TO DUST MITE: ICD-10-CM

## 2022-12-09 DIAGNOSIS — Z88.9 DRUG ALLERGY: Primary | ICD-10-CM

## 2022-12-09 PROCEDURE — 99999 PR PBB SHADOW E&M-EST. PATIENT-LVL III: CPT | Mod: PBBFAC,,, | Performed by: ALLERGY & IMMUNOLOGY

## 2022-12-09 PROCEDURE — 3078F DIAST BP <80 MM HG: CPT | Mod: CPTII,S$GLB,, | Performed by: ALLERGY & IMMUNOLOGY

## 2022-12-09 PROCEDURE — 3074F SYST BP LT 130 MM HG: CPT | Mod: CPTII,S$GLB,, | Performed by: ALLERGY & IMMUNOLOGY

## 2022-12-09 PROCEDURE — 95018 ALL TSTG PERQ&IQ DRUGS/BIOL: CPT | Mod: S$GLB,,, | Performed by: ALLERGY & IMMUNOLOGY

## 2022-12-09 PROCEDURE — 99213 OFFICE O/P EST LOW 20 MIN: CPT | Mod: 25,S$GLB,, | Performed by: ALLERGY & IMMUNOLOGY

## 2022-12-09 PROCEDURE — 95018 PR PERQ&IC ALLG TEST DRUGS/BIOL: ICD-10-PCS | Mod: S$GLB,,, | Performed by: ALLERGY & IMMUNOLOGY

## 2022-12-09 PROCEDURE — 3008F PR BODY MASS INDEX (BMI) DOCUMENTED: ICD-10-PCS | Mod: CPTII,S$GLB,, | Performed by: ALLERGY & IMMUNOLOGY

## 2022-12-09 PROCEDURE — 3074F PR MOST RECENT SYSTOLIC BLOOD PRESSURE < 130 MM HG: ICD-10-PCS | Mod: CPTII,S$GLB,, | Performed by: ALLERGY & IMMUNOLOGY

## 2022-12-09 PROCEDURE — 3044F PR MOST RECENT HEMOGLOBIN A1C LEVEL <7.0%: ICD-10-PCS | Mod: CPTII,S$GLB,, | Performed by: ALLERGY & IMMUNOLOGY

## 2022-12-09 PROCEDURE — 99999 PR PBB SHADOW E&M-EST. PATIENT-LVL III: ICD-10-PCS | Mod: PBBFAC,,, | Performed by: ALLERGY & IMMUNOLOGY

## 2022-12-09 PROCEDURE — 3078F PR MOST RECENT DIASTOLIC BLOOD PRESSURE < 80 MM HG: ICD-10-PCS | Mod: CPTII,S$GLB,, | Performed by: ALLERGY & IMMUNOLOGY

## 2022-12-09 PROCEDURE — 3044F HG A1C LEVEL LT 7.0%: CPT | Mod: CPTII,S$GLB,, | Performed by: ALLERGY & IMMUNOLOGY

## 2022-12-09 PROCEDURE — 1159F MED LIST DOCD IN RCRD: CPT | Mod: CPTII,S$GLB,, | Performed by: ALLERGY & IMMUNOLOGY

## 2022-12-09 PROCEDURE — 1159F PR MEDICATION LIST DOCUMENTED IN MEDICAL RECORD: ICD-10-PCS | Mod: CPTII,S$GLB,, | Performed by: ALLERGY & IMMUNOLOGY

## 2022-12-09 PROCEDURE — 99213 PR OFFICE/OUTPT VISIT, EST, LEVL III, 20-29 MIN: ICD-10-PCS | Mod: 25,S$GLB,, | Performed by: ALLERGY & IMMUNOLOGY

## 2022-12-09 PROCEDURE — 95076 PR INGESTION CHALLENGE TEST; INITIAL 120 MIN: ICD-10-PCS | Mod: S$GLB,,, | Performed by: ALLERGY & IMMUNOLOGY

## 2022-12-09 PROCEDURE — 3008F BODY MASS INDEX DOCD: CPT | Mod: CPTII,S$GLB,, | Performed by: ALLERGY & IMMUNOLOGY

## 2022-12-09 PROCEDURE — 1160F RVW MEDS BY RX/DR IN RCRD: CPT | Mod: CPTII,S$GLB,, | Performed by: ALLERGY & IMMUNOLOGY

## 2022-12-09 PROCEDURE — 95076 INGEST CHALLENGE INI 120 MIN: CPT | Mod: S$GLB,,, | Performed by: ALLERGY & IMMUNOLOGY

## 2022-12-09 PROCEDURE — 1160F PR REVIEW ALL MEDS BY PRESCRIBER/CLIN PHARMACIST DOCUMENTED: ICD-10-PCS | Mod: CPTII,S$GLB,, | Performed by: ALLERGY & IMMUNOLOGY

## 2022-12-09 NOTE — PROGRESS NOTES
Subjective:       Patient ID: Abiola PIÑA [facility] is a 62 y.o. female.    Chief Complaint:  Allergy Testing      63 yo woman presents for continued evaluation of PCN allergy, drug allergy and allergic rhinitis due to dust mites. She was last seen 12/6/2022. Did have immunocaps then with positive to dust mites. She is on H 1 blocker daily and azelastine 2 SNE BID as needed. She has held those. She had PCN skin test and challenge 12/6 and tolerated all. Now here for cephalexin.     Prior History taken 11/17/2022: consult from Dr César Contreras for drug allergy. She states >25 years ago she was given cephalosporin med and only remembers having mild eye swelling of eyelid. No hies or SOB she remembers. Was told to avoid cephalosporins and PCN because closely related. Has been avoiding ever since but would like to know if really allergic. She does have rhinitis. Has PND, occ sneeze, slight sore throat and cough. Trigger by scents, chemicals, smoke and mold. She currently has mold in her attic, being rectified. She takes Claritin or zyrtec daily and benadryl nightly. In October was sick and given steroid shot and z pack then 7 days of Zithromax 500 daily for 7 days to improve. No asthma. No eczema. No food, insect or latex allergy. Has anxiety and diverticulitis. No other medical issues.       Environmental History: see history section for home environment  Review of Systems   Constitutional:  Negative for appetite change, chills, fatigue and fever.   HENT:  Positive for congestion, postnasal drip, sneezing and sore throat. Negative for ear discharge, ear pain, facial swelling, nosebleeds, rhinorrhea, sinus pressure, trouble swallowing and voice change.    Eyes:  Negative for discharge, redness, itching and visual disturbance.   Respiratory:  Positive for cough. Negative for choking, chest tightness, shortness of breath and wheezing.    Cardiovascular:  Negative for chest pain, palpitations and leg swelling.    Gastrointestinal:  Negative for abdominal distention, abdominal pain, constipation, diarrhea, nausea and vomiting.   Genitourinary:  Negative for difficulty urinating.   Musculoskeletal:  Negative for arthralgias, gait problem, joint swelling and myalgias.   Skin:  Negative for color change and rash.   Neurological:  Negative for dizziness, syncope, weakness, light-headedness and headaches.   Hematological:  Negative for adenopathy. Does not bruise/bleed easily.   Psychiatric/Behavioral:  Negative for agitation, behavioral problems, confusion and sleep disturbance. The patient is not nervous/anxious.       Objective:      Physical Exam  Vitals and nursing note reviewed.   Constitutional:       General: She is not in acute distress.     Appearance: Normal appearance. She is not ill-appearing.   HENT:      Head: Normocephalic and atraumatic.      Right Ear: External ear normal.      Left Ear: External ear normal.      Nose: No rhinorrhea.   Eyes:      General:         Right eye: No discharge.         Left eye: No discharge.      Conjunctiva/sclera: Conjunctivae normal.   Cardiovascular:      Rate and Rhythm: Normal rate and regular rhythm.   Pulmonary:      Effort: Pulmonary effort is normal. No respiratory distress.   Abdominal:      General: There is no distension.   Musculoskeletal:         General: Normal range of motion.      Cervical back: Normal range of motion.   Skin:     General: Skin is warm and dry.      Findings: No erythema or rash.   Neurological:      Mental Status: She is alert and oriented to person, place, and time.   Psychiatric:         Mood and Affect: Mood normal.         Behavior: Behavior normal.         Thought Content: Thought content normal.         Judgment: Judgment normal.       Laboratory:   none performed     Skin testing with cephalexin: This type of testing assesses the risk for an IgE-mediated, immediate-type hypersensitivity reaction following administration of cephalexin. The  spectrum of symptoms that this test will assess the risk for includes: urticaria, angioedema, laryngeal edema, bronchospasm and shock. This test does not assess the risk for symptoms produced by non-immunologic mechanisms or other immunologic mechanisms including serum sickness, interstitial nephritis, immunologic cytopenias, Payne-Surinder syndrome, erythema multiforme, toxic epidermal necrolysis, etc.    Prick test at 1345 with 3+ histamine and negative cephalexin and saline   Intradermals done at 1400 with 3+ histamine and negative cephalexin and saline    cephalexin challenge  Total time: 90 minutes  The risks and benefits of an ingestion challenge with cephalexin was reviewed with the patient. After verbal consent was obtained, 50 mg of cephalexin PO was administered. The patient was observed for 30 minutes with no change in physical exam.    Then 250 mg of cephalexin PO was administered. The patient was observed for 30 minutes with no change in physical exam.   Then 250 mg of cephalexin PO was administered. The patient was observed for 30 minutes with no change in physical exam.     Results:  No immediate type hypersensitivity to cephalexin  Interpretation: The patient has no evidence of immediate-type hypersensitivity to cephalexin, and should be able to tolerate cephalosporin-class antibiotics without an increased risk of anaphylactic reaction. The patient was counseled that delayed-type allergy may take more than 24 hours to develop. While these reactions are not life-threatening, please call the on-call allergy fellow in the event that any symptoms develop.     Assessment:       1. Drug allergy    2. Allergic rhinitis due to dust mite         Plan:       The patient has no evidence of immediate-type hypersensitivity to cephalexin, and should be able to tolerate cephalosporin-class antibiotics without an increased risk of anaphylactic reaction. The patient was counseled that delayed-type allergy may take  more than 24 hours to develop. While these reactions are not life-threatening, please call the on-call allergy fellow in the event that any symptoms develop. Note: this does not determine the patient's risk for future development of cephalosporin-specific IgE  For rhinitis  continue fluticasone 2 SEN daily, zyrtec daily and azelastine 2 SEN BID as needed  Dust mite avoidance, measures discussed and handout provided.

## 2023-01-24 ENCOUNTER — PATIENT MESSAGE (OUTPATIENT)
Dept: RADIOLOGY | Facility: HOSPITAL | Age: 63
End: 2023-01-24

## 2023-01-24 ENCOUNTER — HOSPITAL ENCOUNTER (OUTPATIENT)
Dept: RADIOLOGY | Facility: HOSPITAL | Age: 63
Discharge: HOME | End: 2023-01-24
Attending: INTERNAL MEDICINE
Payer: COMMERCIAL

## 2023-01-24 DIAGNOSIS — Z12.31 OTHER SCREENING MAMMOGRAM: ICD-10-CM

## 2023-01-24 PROCEDURE — 77067 MAMMO DIGITAL SCREENING BILAT WITH TOMO: ICD-10-PCS | Mod: 26,,, | Performed by: RADIOLOGY

## 2023-01-24 PROCEDURE — 77067 SCR MAMMO BI INCL CAD: CPT | Mod: 26,,, | Performed by: RADIOLOGY

## 2023-01-24 PROCEDURE — 77063 BREAST TOMOSYNTHESIS BI: CPT | Mod: 26,,, | Performed by: RADIOLOGY

## 2023-01-24 PROCEDURE — 77067 SCR MAMMO BI INCL CAD: CPT | Mod: TC

## 2023-01-24 PROCEDURE — 77063 MAMMO DIGITAL SCREENING BILAT WITH TOMO: ICD-10-PCS | Mod: 26,,, | Performed by: RADIOLOGY

## 2023-01-30 ENCOUNTER — PATIENT MESSAGE (OUTPATIENT)
Dept: ALLERGY | Facility: CLINIC | Age: 63
End: 2023-01-30
Payer: COMMERCIAL

## 2023-01-30 NOTE — PROGRESS NOTES
MEDICAL HISTORY:  Hypothyroid disease.  Fatty liver with focal nodular hyperplasia.  Anxiety disorder.  Cholecystectomy with pancreatitis.   x3.  Abdominal hernia repair.  Diverticula disease of the colon, for which she had in , a sigmoid colectomy due to diverticulitis, then a stent put in a month later because of microperforation. In 2015, she underwent a balloon dilatation due to stenosis of the colonic anastomosis        Oncology History Overview Note   Diverticulitis   1. 2014: Surgery - Lap segmental resection of sigmoid cancer  2. Complicated by anastomotic leak and subsequent stricture managed conservatively with IR drain and repeat balloon dilations  3. 12/7/15: Robo lap exploration, laparoscopic hand-assisted redo low anterior resection, hand assisted omental advancement flap, cysto and stents     SOCIAL HISTORY:  Tobacco use and alcohol use -- none.      Medications  Xanax 0.5 mg 3 times daily   cytomel 25 mcg half a tablet daily   Levothyroxine 0.088 mg daily   Over-the-counter Pepcid as needed      62-year-old female  Presents as a follow-up visit      In early December she was evaluated by Allergy regarding penicillin/cephalosporin allergy .  She was given a skin test in an oral challenge and had no reaction and was determined that there was no penicillin allergy.     she presented to urgent care with sore throat.  She was tested negative for COVID influenza.  It appears she was given prescription for doxycycline.  She went to urgent care the next day  in which the sore throat was much worse swollen glands low-grade fever.  Reported swab was positive for strep a and she was given amoxicillin for 10 days and she responded well.    Presently she reports no sore throat.  Minimal nasal congestion.  No ear pain no chest congestion shortness of breath or cough    She is going to external physical therapy at [facility] tomorrow for treatment of low back  pain and leg pain which appears to be muscular.  She Needs order for this    She is due to have a chemistry and thyroid test follow-up.  She is taking 2 or 3 Advils a day as well as 2 or 3 Tylenol is a day.    Examination   Weight 192 lb   Pulse Pulse 76   Blood pressure blood pressure 122/70   Tympanic membranes normal   Nasal mucosa is clear  Oropharynx no abnormal findings   Neck no palpable adenopathy no thyromegaly   Chest clear breath sounds  Heart regular rate and rhythm   Abdominal exam soft nontender no hepatosplenomegaly abdominal masses   No tenderness when abduct or rotate the legs at the hip joint    Impression  Hypothyroid   History streptococcal pharyngitis   Lumbar leg pain    Plan   Order for physical therapy   Orders for chemistry, hemoglobin A1c, TSH free T4 free T3 and urinalysis  Attention a proper diet physical activity

## 2023-01-31 ENCOUNTER — OFFICE VISIT (OUTPATIENT)
Dept: INTERNAL MEDICINE | Facility: CLINIC | Age: 63
End: 2023-01-31
Payer: COMMERCIAL

## 2023-01-31 VITALS
HEART RATE: 84 BPM | DIASTOLIC BLOOD PRESSURE: 70 MMHG | OXYGEN SATURATION: 96 % | WEIGHT: 192.25 LBS | BODY MASS INDEX: 32.03 KG/M2 | HEIGHT: 65 IN | SYSTOLIC BLOOD PRESSURE: 122 MMHG

## 2023-01-31 DIAGNOSIS — E03.9 HYPOTHYROIDISM, UNSPECIFIED TYPE: Primary | ICD-10-CM

## 2023-01-31 DIAGNOSIS — M54.50 LUMBAR PAIN: ICD-10-CM

## 2023-01-31 DIAGNOSIS — Z87.09 HISTORY OF STREPTOCOCCAL PHARYNGITIS: ICD-10-CM

## 2023-01-31 LAB
BILIRUB UR QL STRIP: NEGATIVE
CLARITY UR REFRACT.AUTO: CLEAR
COLOR UR AUTO: COLORLESS
GLUCOSE UR QL STRIP: NEGATIVE
HGB UR QL STRIP: NEGATIVE
KETONES UR QL STRIP: NEGATIVE
LEUKOCYTE ESTERASE UR QL STRIP: NEGATIVE
NITRITE UR QL STRIP: NEGATIVE
PH UR STRIP: 7 [PH] (ref 5–8)
PROT UR QL STRIP: NEGATIVE
SP GR UR STRIP: 1.01 (ref 1–1.03)
URN SPEC COLLECT METH UR: ABNORMAL

## 2023-01-31 PROCEDURE — 1160F PR REVIEW ALL MEDS BY PRESCRIBER/CLIN PHARMACIST DOCUMENTED: ICD-10-PCS | Mod: CPTII,S$GLB,, | Performed by: INTERNAL MEDICINE

## 2023-01-31 PROCEDURE — 1159F PR MEDICATION LIST DOCUMENTED IN MEDICAL RECORD: ICD-10-PCS | Mod: CPTII,S$GLB,, | Performed by: INTERNAL MEDICINE

## 2023-01-31 PROCEDURE — 3008F PR BODY MASS INDEX (BMI) DOCUMENTED: ICD-10-PCS | Mod: CPTII,S$GLB,, | Performed by: INTERNAL MEDICINE

## 2023-01-31 PROCEDURE — 81003 URINALYSIS AUTO W/O SCOPE: CPT | Performed by: INTERNAL MEDICINE

## 2023-01-31 PROCEDURE — 99999 PR PBB SHADOW E&M-EST. PATIENT-LVL V: ICD-10-PCS | Mod: PBBFAC,,, | Performed by: INTERNAL MEDICINE

## 2023-01-31 PROCEDURE — 3008F BODY MASS INDEX DOCD: CPT | Mod: CPTII,S$GLB,, | Performed by: INTERNAL MEDICINE

## 2023-01-31 PROCEDURE — 1160F RVW MEDS BY RX/DR IN RCRD: CPT | Mod: CPTII,S$GLB,, | Performed by: INTERNAL MEDICINE

## 2023-01-31 PROCEDURE — 99214 OFFICE O/P EST MOD 30 MIN: CPT | Mod: S$GLB,,, | Performed by: INTERNAL MEDICINE

## 2023-01-31 PROCEDURE — 3074F SYST BP LT 130 MM HG: CPT | Mod: CPTII,S$GLB,, | Performed by: INTERNAL MEDICINE

## 2023-01-31 PROCEDURE — 1159F MED LIST DOCD IN RCRD: CPT | Mod: CPTII,S$GLB,, | Performed by: INTERNAL MEDICINE

## 2023-01-31 PROCEDURE — 3074F PR MOST RECENT SYSTOLIC BLOOD PRESSURE < 130 MM HG: ICD-10-PCS | Mod: CPTII,S$GLB,, | Performed by: INTERNAL MEDICINE

## 2023-01-31 PROCEDURE — 3078F PR MOST RECENT DIASTOLIC BLOOD PRESSURE < 80 MM HG: ICD-10-PCS | Mod: CPTII,S$GLB,, | Performed by: INTERNAL MEDICINE

## 2023-01-31 PROCEDURE — 99214 PR OFFICE/OUTPT VISIT, EST, LEVL IV, 30-39 MIN: ICD-10-PCS | Mod: S$GLB,,, | Performed by: INTERNAL MEDICINE

## 2023-01-31 PROCEDURE — 99999 PR PBB SHADOW E&M-EST. PATIENT-LVL V: CPT | Mod: PBBFAC,,, | Performed by: INTERNAL MEDICINE

## 2023-01-31 PROCEDURE — 3078F DIAST BP <80 MM HG: CPT | Mod: CPTII,S$GLB,, | Performed by: INTERNAL MEDICINE

## 2023-01-31 RX ORDER — INFLUENZA A VIRUS A/DELAWARE/55/2019 CVR-45 (H1N1) ANTIGEN (MDCK CELL DERIVED, PROPIOLACTONE INACTIVATED), INFLUENZA A VIRUS A/DARWIN/11/2021 (H3N2) ANTIGEN (MDCK CELL DERIVED, PROPIOLACTONE INACTIVATED), INFLUENZA B VIRUS B/SINGAPORE/WUH4618/2021 ANTIGEN (MDCK CELL DERIVED, PROPIOLACTONE INACTIVATED), INFLUENZA B VIRUS B/SINGAPORE/INFTT-16-0610/2016 ANTIGEN (MDCK CELL DERIVED, PROPIOLACTONE INACTIVATED) 15; 15; 15; 15 UG/.5ML; UG/.5ML; UG/.5ML; UG/.5ML
INJECTION, SUSPENSION INTRAMUSCULAR
COMMUNITY
Start: 2022-11-15

## 2023-01-31 RX ORDER — AMOXICILLIN 875 MG/1
875 TABLET, FILM COATED ORAL 2 TIMES DAILY
COMMUNITY
Start: 2022-12-28 | End: 2023-03-24

## 2023-03-01 ENCOUNTER — OFFICE VISIT (OUTPATIENT)
Dept: INTERNAL MEDICINE | Facility: CLINIC | Age: 63
End: 2023-03-01
Payer: COMMERCIAL

## 2023-03-01 ENCOUNTER — LAB VISIT (OUTPATIENT)
Dept: LAB | Facility: HOSPITAL | Age: 63
End: 2023-03-01
Attending: INTERNAL MEDICINE
Payer: COMMERCIAL

## 2023-03-01 VITALS
OXYGEN SATURATION: 98 % | SYSTOLIC BLOOD PRESSURE: 120 MMHG | HEART RATE: 85 BPM | BODY MASS INDEX: 30.82 KG/M2 | DIASTOLIC BLOOD PRESSURE: 70 MMHG | HEIGHT: 66 IN | WEIGHT: 191.81 LBS

## 2023-03-01 DIAGNOSIS — J31.0 CHRONIC RHINITIS: ICD-10-CM

## 2023-03-01 DIAGNOSIS — J06.9 ACUTE URI: ICD-10-CM

## 2023-03-01 DIAGNOSIS — J06.9 ACUTE URI: Primary | ICD-10-CM

## 2023-03-01 DIAGNOSIS — E03.9 HYPOTHYROIDISM, UNSPECIFIED TYPE: ICD-10-CM

## 2023-03-01 DIAGNOSIS — Z87.09 HISTORY OF STREPTOCOCCAL PHARYNGITIS: ICD-10-CM

## 2023-03-01 DIAGNOSIS — R05.9 COUGH, UNSPECIFIED TYPE: ICD-10-CM

## 2023-03-01 DIAGNOSIS — M54.50 LUMBAR PAIN: ICD-10-CM

## 2023-03-01 LAB
ALBUMIN SERPL BCP-MCNC: 4 G/DL (ref 3.5–5.2)
ALP SERPL-CCNC: 98 U/L (ref 55–135)
ALT SERPL W/O P-5'-P-CCNC: 30 U/L (ref 10–44)
ANION GAP SERPL CALC-SCNC: 8 MMOL/L (ref 8–16)
AST SERPL-CCNC: 23 U/L (ref 10–40)
BASOPHILS # BLD AUTO: 0.04 K/UL (ref 0–0.2)
BASOPHILS NFR BLD: 0.6 % (ref 0–1.9)
BILIRUB SERPL-MCNC: 0.5 MG/DL (ref 0.1–1)
BUN SERPL-MCNC: 14 MG/DL (ref 8–23)
CALCIUM SERPL-MCNC: 9.9 MG/DL (ref 8.7–10.5)
CHLORIDE SERPL-SCNC: 106 MMOL/L (ref 95–110)
CO2 SERPL-SCNC: 28 MMOL/L (ref 23–29)
CREAT SERPL-MCNC: 0.9 MG/DL (ref 0.5–1.4)
DIFFERENTIAL METHOD: NORMAL
EOSINOPHIL # BLD AUTO: 0.1 K/UL (ref 0–0.5)
EOSINOPHIL NFR BLD: 1 % (ref 0–8)
ERYTHROCYTE [DISTWIDTH] IN BLOOD BY AUTOMATED COUNT: 12.8 % (ref 11.5–14.5)
EST. GFR  (NO RACE VARIABLE): >60 ML/MIN/1.73 M^2
ESTIMATED AVG GLUCOSE: 111 MG/DL (ref 68–131)
GLUCOSE SERPL-MCNC: 116 MG/DL (ref 70–110)
HBA1C MFR BLD: 5.5 % (ref 4–5.6)
HCT VFR BLD AUTO: 40 % (ref 37–48.5)
HGB BLD-MCNC: 13 G/DL (ref 12–16)
IMM GRANULOCYTES # BLD AUTO: 0.02 K/UL (ref 0–0.04)
IMM GRANULOCYTES NFR BLD AUTO: 0.3 % (ref 0–0.5)
LYMPHOCYTES # BLD AUTO: 2 K/UL (ref 1–4.8)
LYMPHOCYTES NFR BLD: 29.6 % (ref 18–48)
MCH RBC QN AUTO: 28.8 PG (ref 27–31)
MCHC RBC AUTO-ENTMCNC: 32.5 G/DL (ref 32–36)
MCV RBC AUTO: 89 FL (ref 82–98)
MONOCYTES # BLD AUTO: 0.4 K/UL (ref 0.3–1)
MONOCYTES NFR BLD: 5.5 % (ref 4–15)
NEUTROPHILS # BLD AUTO: 4.3 K/UL (ref 1.8–7.7)
NEUTROPHILS NFR BLD: 63 % (ref 38–73)
NRBC BLD-RTO: 0 /100 WBC
PLATELET # BLD AUTO: 259 K/UL (ref 150–450)
PMV BLD AUTO: 10.3 FL (ref 9.2–12.9)
POTASSIUM SERPL-SCNC: 5 MMOL/L (ref 3.5–5.1)
PROT SERPL-MCNC: 7.7 G/DL (ref 6–8.4)
RBC # BLD AUTO: 4.51 M/UL (ref 4–5.4)
SODIUM SERPL-SCNC: 142 MMOL/L (ref 136–145)
T3 SERPL-MCNC: 128 NG/DL (ref 60–180)
T4 FREE SERPL-MCNC: 1.02 NG/DL (ref 0.71–1.51)
TSH SERPL DL<=0.005 MIU/L-ACNC: 2.37 UIU/ML (ref 0.4–4)
WBC # BLD AUTO: 6.89 K/UL (ref 3.9–12.7)

## 2023-03-01 PROCEDURE — 3044F HG A1C LEVEL LT 7.0%: CPT | Mod: CPTII,S$GLB,, | Performed by: INTERNAL MEDICINE

## 2023-03-01 PROCEDURE — 3008F BODY MASS INDEX DOCD: CPT | Mod: CPTII,S$GLB,, | Performed by: INTERNAL MEDICINE

## 2023-03-01 PROCEDURE — 3078F DIAST BP <80 MM HG: CPT | Mod: CPTII,S$GLB,, | Performed by: INTERNAL MEDICINE

## 2023-03-01 PROCEDURE — 83036 HEMOGLOBIN GLYCOSYLATED A1C: CPT | Performed by: INTERNAL MEDICINE

## 2023-03-01 PROCEDURE — 3044F PR MOST RECENT HEMOGLOBIN A1C LEVEL <7.0%: ICD-10-PCS | Mod: CPTII,S$GLB,, | Performed by: INTERNAL MEDICINE

## 2023-03-01 PROCEDURE — 84480 ASSAY TRIIODOTHYRONINE (T3): CPT | Performed by: INTERNAL MEDICINE

## 2023-03-01 PROCEDURE — 99214 OFFICE O/P EST MOD 30 MIN: CPT | Mod: S$GLB,,, | Performed by: INTERNAL MEDICINE

## 2023-03-01 PROCEDURE — 3008F PR BODY MASS INDEX (BMI) DOCUMENTED: ICD-10-PCS | Mod: CPTII,S$GLB,, | Performed by: INTERNAL MEDICINE

## 2023-03-01 PROCEDURE — 1160F RVW MEDS BY RX/DR IN RCRD: CPT | Mod: CPTII,S$GLB,, | Performed by: INTERNAL MEDICINE

## 2023-03-01 PROCEDURE — 3074F PR MOST RECENT SYSTOLIC BLOOD PRESSURE < 130 MM HG: ICD-10-PCS | Mod: CPTII,S$GLB,, | Performed by: INTERNAL MEDICINE

## 2023-03-01 PROCEDURE — 99999 PR PBB SHADOW E&M-EST. PATIENT-LVL V: CPT | Mod: PBBFAC,,, | Performed by: INTERNAL MEDICINE

## 2023-03-01 PROCEDURE — 1159F PR MEDICATION LIST DOCUMENTED IN MEDICAL RECORD: ICD-10-PCS | Mod: CPTII,S$GLB,, | Performed by: INTERNAL MEDICINE

## 2023-03-01 PROCEDURE — 99214 PR OFFICE/OUTPT VISIT, EST, LEVL IV, 30-39 MIN: ICD-10-PCS | Mod: S$GLB,,, | Performed by: INTERNAL MEDICINE

## 2023-03-01 PROCEDURE — 80053 COMPREHEN METABOLIC PANEL: CPT | Performed by: INTERNAL MEDICINE

## 2023-03-01 PROCEDURE — 84443 ASSAY THYROID STIM HORMONE: CPT | Performed by: INTERNAL MEDICINE

## 2023-03-01 PROCEDURE — 3074F SYST BP LT 130 MM HG: CPT | Mod: CPTII,S$GLB,, | Performed by: INTERNAL MEDICINE

## 2023-03-01 PROCEDURE — 3078F PR MOST RECENT DIASTOLIC BLOOD PRESSURE < 80 MM HG: ICD-10-PCS | Mod: CPTII,S$GLB,, | Performed by: INTERNAL MEDICINE

## 2023-03-01 PROCEDURE — 99999 PR PBB SHADOW E&M-EST. PATIENT-LVL V: ICD-10-PCS | Mod: PBBFAC,,, | Performed by: INTERNAL MEDICINE

## 2023-03-01 PROCEDURE — 1160F PR REVIEW ALL MEDS BY PRESCRIBER/CLIN PHARMACIST DOCUMENTED: ICD-10-PCS | Mod: CPTII,S$GLB,, | Performed by: INTERNAL MEDICINE

## 2023-03-01 PROCEDURE — 36415 COLL VENOUS BLD VENIPUNCTURE: CPT | Performed by: INTERNAL MEDICINE

## 2023-03-01 PROCEDURE — 84439 ASSAY OF FREE THYROXINE: CPT | Performed by: INTERNAL MEDICINE

## 2023-03-01 PROCEDURE — 1159F MED LIST DOCD IN RCRD: CPT | Mod: CPTII,S$GLB,, | Performed by: INTERNAL MEDICINE

## 2023-03-01 PROCEDURE — 85025 COMPLETE CBC W/AUTO DIFF WBC: CPT | Performed by: INTERNAL MEDICINE

## 2023-03-01 RX ORDER — BROMPHENIRAMINE MALEATE, PSEUDOEPHEDRINE HYDROCHLORIDE, AND DEXTROMETHORPHAN HYDROBROMIDE 2; 30; 10 MG/5ML; MG/5ML; MG/5ML
5 SYRUP ORAL
Qty: 120 ML | Refills: 0 | Status: SHIPPED | OUTPATIENT
Start: 2023-03-01 | End: 2023-03-24

## 2023-03-01 RX ORDER — BENZONATATE 200 MG/1
200 CAPSULE ORAL 3 TIMES DAILY
COMMUNITY
Start: 2023-02-24 | End: 2023-03-04

## 2023-03-01 RX ORDER — DOXYCYCLINE 100 MG/1
100 CAPSULE ORAL 2 TIMES DAILY
COMMUNITY
Start: 2023-02-24 | End: 2023-03-24

## 2023-03-01 RX ORDER — MONTELUKAST SODIUM 10 MG/1
10 TABLET ORAL DAILY
Qty: 30 TABLET | Refills: 2 | Status: SHIPPED | OUTPATIENT
Start: 2023-03-01 | End: 2023-03-24

## 2023-03-01 NOTE — PROGRESS NOTES
MEDICAL HISTORY:  Hypothyroid disease.  Fatty liver with focal nodular hyperplasia.  Anxiety disorder.  Cholecystectomy with pancreatitis.   x3.  Abdominal hernia repair.  Diverticula disease of the colon, for which she had in , a sigmoid colectomy due to diverticulitis, then a stent put in a month later because of microperforation. In 2015, she underwent a balloon dilatation due to stenosis of the colonic anastomosis        Oncology History Overview Note   Diverticulitis   1. 2014: Surgery - Lap segmental resection of sigmoid cancer  2. Complicated by anastomotic leak and subsequent stricture managed conservatively with IR drain and repeat balloon dilations  3. 12/7/15: Robo lap exploration, laparoscopic hand-assisted redo low anterior resection, hand assisted omental advancement flap, cysto and stents     SOCIAL HISTORY:  Tobacco use and alcohol use -- none.       Medications  Xanax 0.5 mg 3 times daily   cytomel 25 mcg half a tablet daily   Levothyroxine 0.088 mg daily   Over-the-counter Pepcid as needed      62-year-old female  Starting  she was feeling sick had a productive cough of clear mucus.  She went urgent care  for which she got a dexamethasone 10 mg injection.  Tessalon Perles and doxycycline was prescribed but she did not start the medication.  She was told of the right ear bulging.  She is reported no pain.  There is still some degree of congestion in the chest.  When she calls is clear mucus maybe attentive yellow in his to nasal and head congestion.  At times she will have trouble breathing through her nose.    She is been on courses of antibiotics in .  Even a course of prednisone.    Recently evaluated by Allergy for penicillin allergy in his was not the case.  She has received Prevnar 20 back in 2022    Examination   Weight 191 lb   Pulse 80   Blood pressure 130/72   Slight serous effusion right  ear  Nasal mucosa is edematous with swollen nostril left nostril  Oropharynx minimally hyperemic  Neck no adenopathy  Chest clear breath sounds  Heart regular rate rhythm       Acute respiratory infection with cough  Chronic rhinitis    Plan   CT of the sinuses  5 mL every 4 6 hours as needed

## 2023-03-02 ENCOUNTER — PATIENT MESSAGE (OUTPATIENT)
Dept: INTERNAL MEDICINE | Facility: CLINIC | Age: 63
End: 2023-03-02
Payer: COMMERCIAL

## 2023-03-02 ENCOUNTER — HOSPITAL ENCOUNTER (OUTPATIENT)
Dept: RADIOLOGY | Facility: HOSPITAL | Age: 63
Discharge: HOME | End: 2023-03-02
Attending: INTERNAL MEDICINE
Payer: COMMERCIAL

## 2023-03-02 DIAGNOSIS — R05.9 COUGH, UNSPECIFIED TYPE: ICD-10-CM

## 2023-03-02 DIAGNOSIS — J06.9 ACUTE URI: ICD-10-CM

## 2023-03-02 DIAGNOSIS — J31.0 CHRONIC RHINITIS: ICD-10-CM

## 2023-03-02 PROCEDURE — 70486 CT MAXILLOFACIAL W/O DYE: CPT | Mod: 26,,, | Performed by: RADIOLOGY

## 2023-03-02 PROCEDURE — 70486 CT MEDTRONIC SINUSES WITHOUT: ICD-10-PCS | Mod: 26,,, | Performed by: RADIOLOGY

## 2023-03-02 PROCEDURE — 70486 CT MAXILLOFACIAL W/O DYE: CPT | Mod: TC

## 2023-03-24 ENCOUNTER — OFFICE VISIT (OUTPATIENT)
Dept: INTERNAL MEDICINE | Facility: CLINIC | Age: 63
End: 2023-03-24
Payer: COMMERCIAL

## 2023-03-24 VITALS
HEART RATE: 83 BPM | DIASTOLIC BLOOD PRESSURE: 80 MMHG | WEIGHT: 192.44 LBS | OXYGEN SATURATION: 97 % | HEIGHT: 65 IN | SYSTOLIC BLOOD PRESSURE: 130 MMHG | BODY MASS INDEX: 32.06 KG/M2

## 2023-03-24 DIAGNOSIS — J02.9 PHARYNGITIS, UNSPECIFIED ETIOLOGY: Primary | ICD-10-CM

## 2023-03-24 DIAGNOSIS — R05.9 COUGH: ICD-10-CM

## 2023-03-24 LAB
GROUP A STREP, MOLECULAR: NEGATIVE
SARS-COV-2 RNA RESP QL NAA+PROBE: NOT DETECTED

## 2023-03-24 PROCEDURE — 99214 PR OFFICE/OUTPT VISIT, EST, LEVL IV, 30-39 MIN: ICD-10-PCS | Mod: S$GLB,,, | Performed by: INTERNAL MEDICINE

## 2023-03-24 PROCEDURE — 1159F MED LIST DOCD IN RCRD: CPT | Mod: CPTII,S$GLB,, | Performed by: INTERNAL MEDICINE

## 2023-03-24 PROCEDURE — 3008F PR BODY MASS INDEX (BMI) DOCUMENTED: ICD-10-PCS | Mod: CPTII,S$GLB,, | Performed by: INTERNAL MEDICINE

## 2023-03-24 PROCEDURE — 3079F PR MOST RECENT DIASTOLIC BLOOD PRESSURE 80-89 MM HG: ICD-10-PCS | Mod: CPTII,S$GLB,, | Performed by: INTERNAL MEDICINE

## 2023-03-24 PROCEDURE — 3075F SYST BP GE 130 - 139MM HG: CPT | Mod: CPTII,S$GLB,, | Performed by: INTERNAL MEDICINE

## 2023-03-24 PROCEDURE — U0005 INFEC AGEN DETEC AMPLI PROBE: HCPCS | Performed by: INTERNAL MEDICINE

## 2023-03-24 PROCEDURE — 3075F PR MOST RECENT SYSTOLIC BLOOD PRESS GE 130-139MM HG: ICD-10-PCS | Mod: CPTII,S$GLB,, | Performed by: INTERNAL MEDICINE

## 2023-03-24 PROCEDURE — 99999 PR PBB SHADOW E&M-EST. PATIENT-LVL V: ICD-10-PCS | Mod: PBBFAC,,, | Performed by: INTERNAL MEDICINE

## 2023-03-24 PROCEDURE — 3044F PR MOST RECENT HEMOGLOBIN A1C LEVEL <7.0%: ICD-10-PCS | Mod: CPTII,S$GLB,, | Performed by: INTERNAL MEDICINE

## 2023-03-24 PROCEDURE — 3079F DIAST BP 80-89 MM HG: CPT | Mod: CPTII,S$GLB,, | Performed by: INTERNAL MEDICINE

## 2023-03-24 PROCEDURE — 87651 STREP A DNA AMP PROBE: CPT | Performed by: INTERNAL MEDICINE

## 2023-03-24 PROCEDURE — 99999 PR PBB SHADOW E&M-EST. PATIENT-LVL V: CPT | Mod: PBBFAC,,, | Performed by: INTERNAL MEDICINE

## 2023-03-24 PROCEDURE — 99214 OFFICE O/P EST MOD 30 MIN: CPT | Mod: S$GLB,,, | Performed by: INTERNAL MEDICINE

## 2023-03-24 PROCEDURE — 1160F PR REVIEW ALL MEDS BY PRESCRIBER/CLIN PHARMACIST DOCUMENTED: ICD-10-PCS | Mod: CPTII,S$GLB,, | Performed by: INTERNAL MEDICINE

## 2023-03-24 PROCEDURE — 1159F PR MEDICATION LIST DOCUMENTED IN MEDICAL RECORD: ICD-10-PCS | Mod: CPTII,S$GLB,, | Performed by: INTERNAL MEDICINE

## 2023-03-24 PROCEDURE — 3044F HG A1C LEVEL LT 7.0%: CPT | Mod: CPTII,S$GLB,, | Performed by: INTERNAL MEDICINE

## 2023-03-24 PROCEDURE — 3008F BODY MASS INDEX DOCD: CPT | Mod: CPTII,S$GLB,, | Performed by: INTERNAL MEDICINE

## 2023-03-24 PROCEDURE — 1160F RVW MEDS BY RX/DR IN RCRD: CPT | Mod: CPTII,S$GLB,, | Performed by: INTERNAL MEDICINE

## 2023-03-24 RX ORDER — FLUTICASONE PROPIONATE 50 MCG
1 SPRAY, SUSPENSION (ML) NASAL DAILY
Qty: 16 G | Refills: 2 | Status: SHIPPED | OUTPATIENT
Start: 2023-03-24

## 2023-03-24 RX ORDER — AMOXICILLIN 875 MG/1
875 TABLET, FILM COATED ORAL 2 TIMES DAILY
Qty: 20 TABLET | Refills: 0 | Status: SHIPPED | OUTPATIENT
Start: 2023-03-24 | End: 2023-05-12

## 2023-03-24 RX ORDER — LEVALBUTEROL TARTRATE 45 UG/1
1-2 AEROSOL, METERED ORAL EVERY 4 HOURS PRN
Qty: 15 G | Refills: 0 | Status: SHIPPED | OUTPATIENT
Start: 2023-03-24 | End: 2023-05-12

## 2023-03-24 NOTE — PROGRESS NOTES
MEDICAL HISTORY:  Hypothyroid disease.  Fatty liver with focal nodular hyperplasia.  Anxiety disorder.  Cholecystectomy with pancreatitis.   x3.  Abdominal hernia repair.  Diverticula disease of the colon, for which she had in , a sigmoid colectomy due to diverticulitis, then a stent put in a month later because of microperforation. In 2015, she underwent a balloon dilatation due to stenosis of the colonic anastomosis        Oncology History Overview Note   Diverticulitis   1. 2014: Surgery - Lap segmental resection of sigmoid cancer  2. Complicated by anastomotic leak and subsequent stricture managed conservatively with IR drain and repeat balloon dilations  3. 12/7/15: Robo lap exploration, laparoscopic hand-assisted redo low anterior resection, hand assisted omental advancement flap, cysto and stents     SOCIAL HISTORY:  Tobacco use and alcohol use -- none.       Medications  Xanax 0.5 mg 3 times daily   cytomel 25 mcg half a tablet daily   Levothyroxine 0.088 mg daily   Over-the-counter Pepcid as needed        [age] female  Early this morning she was awaking with a sore throat.  Which is still persistent.  She is been clearing the throat of clear mucus with times is been blood-tinged.  Her right ear has been hurting and she is aware of a postnasal drip.  No fever chills no shortness a breath.    In reference to her last visit with me where she presented with upper respiratory infection, she did not take the Singulair,  that was prescribed by me as well as the doxycycline that was prescribed when she was in urgent care at [place].  A situation steadily resolved.      She had a follow-up CT of the sinuses showed no evidence of acute or chronic sinusitis.  Mention was made of empty sella and is we discussed about eventually get a    Prolactin examination   Weight 192   Pulse 80   Blood pressure 130/80   Right tympanic membranes hyperemic  Left tympanic membranes slight  serous effusion   Neck is no palpable anterior adenopathy but slight tender posterior adenopathy   Oropharynx is definitely hyperemic in a small area of blood was seen.  Chest clear breath sounds  Heart regular rate and rhythm    Impression   Pharyngitis  Otitis media    Plan amoxicillin 875 mg twice a day for 10 days   Throat culture for COVID and streptococcal a

## 2023-03-29 ENCOUNTER — TELEPHONE (OUTPATIENT)
Dept: INTERNAL MEDICINE | Facility: CLINIC | Age: 63
End: 2023-03-29

## 2023-03-29 DIAGNOSIS — J32.9 RECURRENT SINUS INFECTIONS: ICD-10-CM

## 2023-03-29 DIAGNOSIS — E23.6 EMPTY SELLA: Primary | ICD-10-CM

## 2023-03-29 NOTE — TELEPHONE ENCOUNTER
----- Message from Fred Burgess sent at 3/28/2023 10:23 AM CDT -----  Contact: self [phone]  Pt requesting lab orders stated pcp was suppose to put some in.    Please call and advise

## 2023-04-20 ENCOUNTER — PATIENT MESSAGE (OUTPATIENT)
Dept: INTERNAL MEDICINE | Facility: CLINIC | Age: 63
End: 2023-04-20
Payer: COMMERCIAL

## 2023-05-03 ENCOUNTER — TELEPHONE (OUTPATIENT)
Dept: OBSTETRICS AND GYNECOLOGY | Facility: CLINIC | Age: 63
End: 2023-05-03
Payer: COMMERCIAL

## 2023-05-03 NOTE — TELEPHONE ENCOUNTER
No care due was identified.  [facility] Embedded Care Due Messages. Reference number: [id number].   5/03/2023 3:58:11 PM CDT

## 2023-05-03 NOTE — TELEPHONE ENCOUNTER
Patient scheduled for 05/22/2023( request sooner appointment) patient will be out of town. I offered her an appointment 05/04/2023(decline). Request Gaffney only( scheduled 05/12/2023)

## 2023-05-03 NOTE — TELEPHONE ENCOUNTER
----- Message from Rita Hubbard sent at 5/3/2023  4:09 PM CDT -----  Type:  Sooner Appointment Request    Caller is requesting a sooner appointment.  Caller declined first available appointment listed below.  Caller will not accept being placed on the waitlist and is requesting a message be sent to doctor.  Name of Caller:pt   When is the first available appointment?05/22  Would the patient rather a call back or a response via [facility]? Call   Best Call Back Number:[phone]  Additional Information:   Pt will be out of town

## 2023-05-09 RX ORDER — LEVOTHYROXINE SODIUM 88 UG/1
TABLET ORAL
Qty: 90 TABLET | Refills: 3 | OUTPATIENT
Start: 2023-05-09

## 2023-05-09 RX ORDER — LEVOTHYROXINE SODIUM 88 UG/1
88 TABLET ORAL
Qty: 90 TABLET | Refills: 3 | Status: SHIPPED | OUTPATIENT
Start: 2023-05-09

## 2023-05-09 NOTE — TELEPHONE ENCOUNTER
No care due was identified.  [facility] Embedded Care Due Messages. Reference number: [id number].   5/09/2023 9:24:27 AM CDT

## 2023-05-09 NOTE — TELEPHONE ENCOUNTER
Refill Decision Note   Abiola Suad  is requesting a refill authorization.  Brief Assessment and Rationale for Refill:  Approve     Medication Therapy Plan:         Comments:     Note composed:3:49 PM 05/09/2023             Appointments     Last Visit   3/24/2023 César Contreras MD   Next Visit   5/9/2023 César Contreras MD

## 2023-05-09 NOTE — TELEPHONE ENCOUNTER
Refill Decision Note   Abiola Borjas  is requesting a refill authorization.  Brief Assessment and Rationale for Refill:  Quick Discontinue     Medication Therapy Plan:  Duplicate      Comments:     Note composed:3:28 PM 05/09/2023

## 2023-05-12 ENCOUNTER — OFFICE VISIT (OUTPATIENT)
Dept: OBSTETRICS AND GYNECOLOGY | Facility: CLINIC | Age: 63
End: 2023-05-12
Payer: COMMERCIAL

## 2023-05-12 VITALS
DIASTOLIC BLOOD PRESSURE: 82 MMHG | BODY MASS INDEX: 31.29 KG/M2 | HEIGHT: 66 IN | SYSTOLIC BLOOD PRESSURE: 130 MMHG | WEIGHT: 194.69 LBS

## 2023-05-12 DIAGNOSIS — Z01.419 WELL FEMALE EXAM WITH ROUTINE GYNECOLOGICAL EXAM: Primary | ICD-10-CM

## 2023-05-12 DIAGNOSIS — N84.0 ENDOMETRIAL POLYP: ICD-10-CM

## 2023-05-12 PROCEDURE — 1159F MED LIST DOCD IN RCRD: CPT | Mod: CPTII,S$GLB,, | Performed by: OBSTETRICS & GYNECOLOGY

## 2023-05-12 PROCEDURE — 1159F PR MEDICATION LIST DOCUMENTED IN MEDICAL RECORD: ICD-10-PCS | Mod: CPTII,S$GLB,, | Performed by: OBSTETRICS & GYNECOLOGY

## 2023-05-12 PROCEDURE — 99396 PR PREVENTIVE VISIT,EST,40-64: ICD-10-PCS | Mod: S$GLB,,, | Performed by: OBSTETRICS & GYNECOLOGY

## 2023-05-12 PROCEDURE — 3079F PR MOST RECENT DIASTOLIC BLOOD PRESSURE 80-89 MM HG: ICD-10-PCS | Mod: CPTII,S$GLB,, | Performed by: OBSTETRICS & GYNECOLOGY

## 2023-05-12 PROCEDURE — 99999 PR PBB SHADOW E&M-EST. PATIENT-LVL V: CPT | Mod: PBBFAC,,, | Performed by: OBSTETRICS & GYNECOLOGY

## 2023-05-12 PROCEDURE — 99396 PREV VISIT EST AGE 40-64: CPT | Mod: S$GLB,,, | Performed by: OBSTETRICS & GYNECOLOGY

## 2023-05-12 PROCEDURE — 3008F BODY MASS INDEX DOCD: CPT | Mod: CPTII,S$GLB,, | Performed by: OBSTETRICS & GYNECOLOGY

## 2023-05-12 PROCEDURE — 3044F HG A1C LEVEL LT 7.0%: CPT | Mod: CPTII,S$GLB,, | Performed by: OBSTETRICS & GYNECOLOGY

## 2023-05-12 PROCEDURE — 3079F DIAST BP 80-89 MM HG: CPT | Mod: CPTII,S$GLB,, | Performed by: OBSTETRICS & GYNECOLOGY

## 2023-05-12 PROCEDURE — 99999 PR PBB SHADOW E&M-EST. PATIENT-LVL V: ICD-10-PCS | Mod: PBBFAC,,, | Performed by: OBSTETRICS & GYNECOLOGY

## 2023-05-12 PROCEDURE — 3008F PR BODY MASS INDEX (BMI) DOCUMENTED: ICD-10-PCS | Mod: CPTII,S$GLB,, | Performed by: OBSTETRICS & GYNECOLOGY

## 2023-05-12 PROCEDURE — 3044F PR MOST RECENT HEMOGLOBIN A1C LEVEL <7.0%: ICD-10-PCS | Mod: CPTII,S$GLB,, | Performed by: OBSTETRICS & GYNECOLOGY

## 2023-05-12 PROCEDURE — 1160F PR REVIEW ALL MEDS BY PRESCRIBER/CLIN PHARMACIST DOCUMENTED: ICD-10-PCS | Mod: CPTII,S$GLB,, | Performed by: OBSTETRICS & GYNECOLOGY

## 2023-05-12 PROCEDURE — 3075F PR MOST RECENT SYSTOLIC BLOOD PRESS GE 130-139MM HG: ICD-10-PCS | Mod: CPTII,S$GLB,, | Performed by: OBSTETRICS & GYNECOLOGY

## 2023-05-12 PROCEDURE — 3075F SYST BP GE 130 - 139MM HG: CPT | Mod: CPTII,S$GLB,, | Performed by: OBSTETRICS & GYNECOLOGY

## 2023-05-12 PROCEDURE — 1160F RVW MEDS BY RX/DR IN RCRD: CPT | Mod: CPTII,S$GLB,, | Performed by: OBSTETRICS & GYNECOLOGY

## 2023-05-12 NOTE — PROGRESS NOTES
SUBJECTIVE:   62 y.o. female   for routine gyn exam. No LMP recorded (lmp unknown). Patient is postmenopausal..  She has no unusual complaints. Has h/o em polyp . Declined D&C. No further PMB since that time.  No HRT.  Desires repeat u/s. Had some right nipple sensitivity that resolved.         Past Medical History:   Diagnosis Date    Allergy     Anxiety     Colon polyps     Deviated septum     Diverticulosis of sigmoid colon     Focal nodular hyperplasia of liver     H/O bone density study     Normal  @ [facility]    Hormone replacement therapy (HRT)     Hypoglycemia     Hypothyroidism     PONV (postoperative nausea and vomiting)     Snores     Thyroid disease      Past Surgical History:   Procedure Laterality Date    ABDOMINAL HERNIA REPAIR       SECTION      CHOLECYSTECTOMY      COLECTOMY  2015    MD Rousseau for stricture    COLON SURGERY  2015    MD Rousseau for Stricture     COLONOSCOPY  2015    Stricture     ENDOMETRIAL ABLATION      HERNIA REPAIR       Social History     Socioeconomic History    Marital status:     Number of children: 3   Tobacco Use    Smoking status: Never    Smokeless tobacco: Never   Substance and Sexual Activity    Alcohol use: Yes     Alcohol/week: 7.0 standard drinks     Types: 7 Glasses of wine per week     Comment: social    Drug use: No    Sexual activity: Not Currently     Partners: Male     Birth control/protection: Post-menopausal, None, See Surgical Hx     Comment: Endometrial Ablation     Family History   Problem Relation Age of Onset    Diabetes Mother     Hypertension Mother     Kidney cancer Father     Depression Sister     Breast cancer Paternal Aunt 70    Prostate cancer Other     Leukemia Other         Medicine induced from chemo    Colon cancer Neg Hx     Miscarriages / Stillbirths Neg Hx      OB History    Para Term  AB Living   3 3 3     3   SAB IAB Ectopic Multiple Live Births           3      # Outcome Date GA  Lbr Mark/2nd Weight Sex Delivery Anes PTL Lv   3 Term  39w0d   F CS-LTranv   PAMELA      Complications: Fetal Intolerance, Previous  section   2 Term  39w0d   F CS-LTranv   PAMELA      Complications: Breech presentation   1 Term  42w0d   F CS-LTranv   PAMELA      Complications: Failure to Progress in Second Stage, Decreased heart rate      Obstetric Comments   Menarche ~ 11         Current Outpatient Medications   Medication Sig Dispense Refill    ALPRAZolam (XANAX) 0.5 MG tablet Take 0.5 mg by mouth 3 (three) times daily as needed.       cyanocobalamin (VITAMIN B-12) 100 MCG tablet Take 100 mcg by mouth once daily.      CYTOMEL 25 mcg Tab TAKE 1/2 A TABLET BY MOUTH DAILY 45 tablet 5    famotidine (PEPCID) 10 MG tablet Take 10 mg by mouth 2 (two) times daily as needed.       FLUCELVAX QUAD [id number], PF, 60 mcg (15 mcg x 4)/0.5 mL Syrg       fluticasone propionate (FLONASE) 50 mcg/actuation nasal spray 1 spray (50 mcg total) by Each Nostril route once daily. 16 g 2    Lactobacillus rhamnosus GG (CULTURELLE) 10 billion cell capsule Take 1 capsule by mouth once daily.      lancets Misc 1 Device by Misc.(Non-Drug; Combo Route) route 2 (two) times daily. 100 each 11    loratadine (CLARITIN) 10 mg tablet Take 10 mg by mouth once daily.      magnesium 30 mg Tab Take by mouth once. 250 mg twice a day      melatonin 3 mg Tab Take 5 mg by mouth.      multivitamin capsule Take 1 capsule by mouth once daily.      omega-3 fatty acids/fish oil (FISH OIL-OMEGA-3 FATTY ACIDS) 300-1,000 mg capsule Take by mouth once daily.      ondansetron (ZOFRAN) 4 MG tablet Take 1 tablet (4 mg total) by mouth every 8 (eight) hours as needed for Nausea. 15 tablet 0    SYNTHROID 88 mcg tablet Take 1 tablet (88 mcg total) by mouth before breakfast. 90 tablet 3    TRUE METRIX GLUCOSE TEST STRIP Strp 1 STRIP BY [facility].(NON-DRUG COMBO ROUTE) ROUTE 2 (TWO) TIMES DAILY. 100 strip 10    vitamin C-multivitamin-mineral 500 mg Chew Take by mouth.    "   vitamin D (VITAMIN D3) 1000 units Tab Take 1,000 Units by mouth once daily.      zinc gluconate 50 mg tablet Take 50 mg by mouth once daily.       No current facility-administered medications for this visit.     Allergies: Patient has no known allergies.     ROS:  Constitutional: no weight loss, weight gain, fever, fatigue  Eyes:  No vision changes, glasses/contacts  ENT/Mouth: No ulcers, sinus problems, ears ringing, headache  Cardiovascular: No inability to lie flat, chest pain, exercise intolerance, swelling, heart palpitations  Respiratory: No wheezing, coughing blood, shortness of breath, or cough  Gastrointestinal: No diarrhea, bloody stool, nausea/vomiting, constipation, gas, hemorrhoids  Genitourinary: No blood in urine, painful urination, urgency of urination, frequency of urination, incomplete emptying, incontinence, abnormal bleeding, painful periods, heavy periods, vaginal discharge, vaginal odor, painful intercourse, sexual problems, bleeding after intercourse.  Musculoskeletal: No muscle weakness  Skin/Breast: No painful breasts, nipple discharge, masses, rash, ulcers  Neurological: No passing out, seizures, numbness, headache  Endocrine: No diabetes, hypothyroid, hyperthyroid, hot flashes, hair loss, abnormal hair growth, acne  Psychiatric: No depression, crying  Hematologic: No bruises, bleeding, swollen lymph nodes, anemia.      OBJECTIVE:   The patient appears well, alert, oriented x 3, in no distress.  /82 (BP Location: Right arm, Patient Position: Sitting, BP Method: Large (Manual))   Ht 5' 6" (1.676 m)   Wt 88.3 kg (194 lb 10.7 oz)   LMP  (LMP Unknown) Comment:   2006  BMI 31.42 kg/m²   NECK: no thyromegaly, trachea midline  SKIN: no acne, striae, hirsutism  CHEST: CTAB  CV: RRR  BREAST EXAM: breasts appear normal, no suspicious masses, no skin or nipple changes or axillary nodes  ABDOMEN: no hernias, masses, or hepatosplenomegaly  GENITALIA: normal external genitalia, no " erythema, no discharge  URETHRA: normal urethra, normal urethral meatus  VAGINA: Normal  CERVIX: no lesions or cervical motion tenderness  UTERUS: normal size, contour, position, consistency, mobility, non-tender  ADNEXA: no mass, fullness, tenderness      ASSESSMENT:   1. Well female exam with routine gynecological exam        2. Endometrial polyp  US Pelvis Comp with Transvag NON-OB (xpd          PLAN:   Orders Placed This Encounter    US Pelvis Comp with Transvag NON-OB (xpd     Discussed healthy lifestyle including regular exercise, healthy eating, etc.  Return to clinic in 1 year

## 2023-05-16 ENCOUNTER — PATIENT MESSAGE (OUTPATIENT)
Dept: INTERNAL MEDICINE | Facility: CLINIC | Age: 63
End: 2023-05-16
Payer: COMMERCIAL

## 2023-05-16 DIAGNOSIS — M54.50 LUMBAR PAIN: Primary | ICD-10-CM

## 2023-05-16 DIAGNOSIS — M25.551 BILATERAL HIP PAIN: ICD-10-CM

## 2023-05-16 DIAGNOSIS — M25.552 BILATERAL HIP PAIN: ICD-10-CM

## 2023-10-24 NOTE — TELEPHONE ENCOUNTER
Refill Routing Note   Medication(s) are not appropriate for processing by [facility] for the following reason(s):      Drug-drug interaction    [facility] action(s):  Defer Care Due:  None identified     Medication Therapy Plan: Duplicate Therapy: CYTOMEL, SYNTHROID    Pharmacist review requested: Yes     Appointments  past 12m or future 3m with PCP    Date Provider   Last Visit   3/24/2023 César Contreras MD   Next Visit   10/24/2023 César Contreras MD   ED visits in past 90 days: 0        Note composed:5:43 PM 10/24/2023

## 2023-10-24 NOTE — TELEPHONE ENCOUNTER
----- Message from Leathajonas Garcia sent at 10/24/2023  4:34 PM CDT -----  Contact: [phone]  [email or web address]     Patient is calling for Medical Advice regarding:sore on back     How long has patient had these symptoms: not sure    Pharmacy name and phone#:  [facility] #[id number] - [place] - [address] AT [address] [address]  Phone: [phone] Fax: [phone]  Would like response via The [email or web address]:  call back     Comments:  Pt said she needs a call back       Requesting an RX refill or new RX.  Is this a refill or new RX: refill  RX name and strength (copy/paste from chart):  CYTOMEL 25 mcg Tab  Is this a 30 day or 90 day RX: 90  Pharmacy name and phone # (copy/paste from chart):    [facility] #[id number] - [place] [address] [address]  Phone: [phone] Fax: [phone]      The doctors have asked that we provide their patients with the following 2 reminders -- prescription refills can take up to 72 hours, and a friendly reminder that in the future you can use your [facility] account to request refills: call back

## 2023-10-24 NOTE — TELEPHONE ENCOUNTER
No care due was identified.  [facility] Embedded Care Due Messages. Reference number: [id number].   10/24/2023 4:54:10 PM CDT

## 2023-10-24 NOTE — TELEPHONE ENCOUNTER
No care due was identified.  [facility] Embedded Care Due Messages. Reference number: [id number].   10/24/2023 4:07:00 PM CDT

## 2023-10-25 RX ORDER — LIOTHYRONINE SODIUM 25 UG/1
TABLET ORAL
Qty: 45 TABLET | Refills: 1 | OUTPATIENT
Start: 2023-10-25

## 2023-10-25 RX ORDER — LIOTHYRONINE SODIUM 25 UG/1
TABLET ORAL
Qty: 45 TABLET | Refills: 5 | Status: SHIPPED | OUTPATIENT
Start: 2023-10-25

## 2023-10-25 NOTE — TELEPHONE ENCOUNTER
Refill Decision Note   Abiola Suad  is requesting a refill authorization.  Brief Assessment and Rationale for Refill:  Quick Discontinue     Medication Therapy Plan:  Receipt confirmed by pharmacy (10/25/2023  6:48 AM CDT)      Pharmacist review requested: Yes   Comments:     Note composed:11:27 AM 10/25/2023

## 2023-11-06 NOTE — PROGRESS NOTES
MEDICAL HISTORY:  Hypothyroid disease.  Fatty liver with focal nodular hyperplasia.  Anxiety disorder.  Cholecystectomy with pancreatitis.   x3.  Abdominal hernia repair.  Diverticula disease of the colon, for which she had in , a sigmoid colectomy due to diverticulitis, then a stent put in a month later because of microperforation. In 2015, she underwent a balloon dilatation due to stenosis of the colonic anastomosis        Oncology History Overview Note   Diverticulitis   1. 2014: Surgery - Lap segmental resection of sigmoid cancer  2. Complicated by anastomotic leak and subsequent stricture managed conservatively with IR drain and repeat balloon dilations  3. 12/7/15: Robo lap exploration, laparoscopic hand-assisted redo low anterior resection, hand assisted omental advancement flap, cysto and stents     SOCIAL HISTORY:  Tobacco use and alcohol use -- none.       Medications  Xanax 0.5 mg 3 times daily   cytomel 25 mcg half a tablet daily   Levothyroxine 0.088 mg daily   Flonase 2 puffs daily   Claritin once a day  Over-the-counter Pepcid as needed      [age] female     Primary reason for the visit is that she was having some muscular mid and lower back pain.  She went to get a massage and was told of a lump on the left side of the back.  She was told that was a possible lipoma but wanted to make sure.  There is no pain in regard to this    In addition needs have a follow-up on blood glucose, hyperglycemia based on last blood test.  Follow-up on liver function studies in regard to history of fatty liver and thyroid status.  Also request that of calcium score test.  Also on last time she would a CT there was evidence for empty sella.    She reports no chest pain, palpitations, shortness a breath, abdominal pain.  Bowel function is regular sometimes more than once a day.    No difficulty urinating examination   Weight 205 BMI 33.20   Blood pressure 136/76   HEENT exam no abnormal  findings  Neck no thyromegaly no masses  Chest clear breath sounds  Heart regular rate rhythm no murmurs  Abdominal exam nontender soft no hepatosplenomegaly abdominal masses  2+ carotid pulses no bruits, 1+ dorsalis pedal pulses  As prominent by 4 cm by 4 cm lipoma soft tissue fatty mass  In the left midthoracic area    Impression   Hypothyroid   Fatty liver with history of focal nodular hyperplasia  Anxiety disorder   Empty sella on CT   Lipoma    Plan  Addendum    Patient Comprehensive set of labs.  Labs reviewed my chart message sent  Findings consistent with pre diabetes in the importance of diet, weight management discussed.  Offered referral to dietary as well as repeat lab studies in 3 months time

## 2023-11-07 ENCOUNTER — OFFICE VISIT (OUTPATIENT)
Dept: INTERNAL MEDICINE | Facility: CLINIC | Age: 63
End: 2023-11-07
Payer: MEDICARE

## 2023-11-07 VITALS
OXYGEN SATURATION: 96 % | HEIGHT: 66 IN | DIASTOLIC BLOOD PRESSURE: 86 MMHG | HEART RATE: 87 BPM | SYSTOLIC BLOOD PRESSURE: 128 MMHG | WEIGHT: 205.69 LBS | BODY MASS INDEX: 33.06 KG/M2

## 2023-11-07 DIAGNOSIS — Z13.6 ENCOUNTER FOR SCREENING FOR CARDIOVASCULAR DISORDERS: ICD-10-CM

## 2023-11-07 DIAGNOSIS — E23.6 EMPTY SELLA: ICD-10-CM

## 2023-11-07 DIAGNOSIS — E03.9 HYPOTHYROIDISM, UNSPECIFIED TYPE: Primary | ICD-10-CM

## 2023-11-07 DIAGNOSIS — F41.9 ANXIETY: ICD-10-CM

## 2023-11-07 DIAGNOSIS — R73.9 HYPERGLYCEMIA: ICD-10-CM

## 2023-11-07 DIAGNOSIS — K76.0 FATTY LIVER: ICD-10-CM

## 2023-11-07 DIAGNOSIS — D17.9 LIPOMA, UNSPECIFIED SITE: ICD-10-CM

## 2023-11-07 PROCEDURE — 99214 PR OFFICE/OUTPT VISIT, EST, LEVL IV, 30-39 MIN: ICD-10-PCS | Mod: S$PBB,,, | Performed by: INTERNAL MEDICINE

## 2023-11-07 PROCEDURE — 99214 OFFICE O/P EST MOD 30 MIN: CPT | Mod: PBBFAC | Performed by: INTERNAL MEDICINE

## 2023-11-07 PROCEDURE — 99999 PR PBB SHADOW E&M-EST. PATIENT-LVL IV: ICD-10-PCS | Mod: PBBFAC,,, | Performed by: INTERNAL MEDICINE

## 2023-11-07 PROCEDURE — 99999 PR PBB SHADOW E&M-EST. PATIENT-LVL IV: CPT | Mod: PBBFAC,,, | Performed by: INTERNAL MEDICINE

## 2023-11-07 PROCEDURE — 99214 OFFICE O/P EST MOD 30 MIN: CPT | Mod: S$PBB,,, | Performed by: INTERNAL MEDICINE

## 2023-11-08 ENCOUNTER — LAB VISIT (OUTPATIENT)
Dept: LAB | Facility: HOSPITAL | Age: 63
End: 2023-11-08
Attending: INTERNAL MEDICINE
Payer: MEDICARE

## 2023-11-08 DIAGNOSIS — E03.9 HYPOTHYROIDISM, UNSPECIFIED TYPE: ICD-10-CM

## 2023-11-08 DIAGNOSIS — R73.9 HYPERGLYCEMIA: ICD-10-CM

## 2023-11-08 DIAGNOSIS — D17.9 LIPOMA, UNSPECIFIED SITE: ICD-10-CM

## 2023-11-08 DIAGNOSIS — F41.9 ANXIETY: Primary | ICD-10-CM

## 2023-11-08 DIAGNOSIS — Z13.6 ENCOUNTER FOR SCREENING FOR CARDIOVASCULAR DISORDERS: ICD-10-CM

## 2023-11-08 DIAGNOSIS — E23.6 EMPTY SELLA: ICD-10-CM

## 2023-11-08 DIAGNOSIS — K76.0 FATTY LIVER: ICD-10-CM

## 2023-11-08 DIAGNOSIS — F41.9 ANXIETY: ICD-10-CM

## 2023-11-08 LAB
25(OH)D3+25(OH)D2 SERPL-MCNC: 81 NG/ML (ref 30–96)
ALBUMIN SERPL BCP-MCNC: 3.9 G/DL (ref 3.5–5.2)
ALP SERPL-CCNC: 94 U/L (ref 55–135)
ALT SERPL W/O P-5'-P-CCNC: 33 U/L (ref 10–44)
ANION GAP SERPL CALC-SCNC: 11 MMOL/L (ref 8–16)
AST SERPL-CCNC: 23 U/L (ref 10–40)
BASOPHILS # BLD AUTO: 0.02 K/UL (ref 0–0.2)
BASOPHILS NFR BLD: 0.3 % (ref 0–1.9)
BILIRUB SERPL-MCNC: 0.5 MG/DL (ref 0.1–1)
BUN SERPL-MCNC: 16 MG/DL (ref 8–23)
CALCIUM SERPL-MCNC: 9.5 MG/DL (ref 8.7–10.5)
CHLORIDE SERPL-SCNC: 107 MMOL/L (ref 95–110)
CHOLEST SERPL-MCNC: 181 MG/DL (ref 120–199)
CHOLEST/HDLC SERPL: 3.9 {RATIO} (ref 2–5)
CO2 SERPL-SCNC: 25 MMOL/L (ref 23–29)
CREAT SERPL-MCNC: 0.7 MG/DL (ref 0.5–1.4)
DIFFERENTIAL METHOD: ABNORMAL
EOSINOPHIL # BLD AUTO: 0.1 K/UL (ref 0–0.5)
EOSINOPHIL NFR BLD: 1.1 % (ref 0–8)
ERYTHROCYTE [DISTWIDTH] IN BLOOD BY AUTOMATED COUNT: 12.7 % (ref 11.5–14.5)
EST. GFR  (NO RACE VARIABLE): >60 ML/MIN/1.73 M^2
ESTIMATED AVG GLUCOSE: 123 MG/DL (ref 68–131)
GLUCOSE SERPL-MCNC: 129 MG/DL (ref 70–110)
HBA1C MFR BLD: 5.9 % (ref 4–5.6)
HCT VFR BLD AUTO: 42 % (ref 37–48.5)
HDLC SERPL-MCNC: 46 MG/DL (ref 40–75)
HDLC SERPL: 25.4 % (ref 20–50)
HGB BLD-MCNC: 13.4 G/DL (ref 12–16)
IMM GRANULOCYTES # BLD AUTO: 0.02 K/UL (ref 0–0.04)
IMM GRANULOCYTES NFR BLD AUTO: 0.3 % (ref 0–0.5)
LDLC SERPL CALC-MCNC: 120.2 MG/DL (ref 63–159)
LYMPHOCYTES # BLD AUTO: 2 K/UL (ref 1–4.8)
LYMPHOCYTES NFR BLD: 31.6 % (ref 18–48)
MAGNESIUM SERPL-MCNC: 2.2 MG/DL (ref 1.6–2.6)
MCH RBC QN AUTO: 28.2 PG (ref 27–31)
MCHC RBC AUTO-ENTMCNC: 31.9 G/DL (ref 32–36)
MCV RBC AUTO: 88 FL (ref 82–98)
MONOCYTES # BLD AUTO: 0.3 K/UL (ref 0.3–1)
MONOCYTES NFR BLD: 4.7 % (ref 4–15)
NEUTROPHILS # BLD AUTO: 4 K/UL (ref 1.8–7.7)
NEUTROPHILS NFR BLD: 62 % (ref 38–73)
NONHDLC SERPL-MCNC: 135 MG/DL
NRBC BLD-RTO: 0 /100 WBC
PLATELET # BLD AUTO: 299 K/UL (ref 150–450)
PMV BLD AUTO: 9.8 FL (ref 9.2–12.9)
POTASSIUM SERPL-SCNC: 4.4 MMOL/L (ref 3.5–5.1)
PROLACTIN SERPL IA-MCNC: 7.8 NG/ML (ref 5.2–26.5)
PROT SERPL-MCNC: 7.4 G/DL (ref 6–8.4)
RBC # BLD AUTO: 4.76 M/UL (ref 4–5.4)
SODIUM SERPL-SCNC: 143 MMOL/L (ref 136–145)
T3FREE SERPL-MCNC: 3.6 PG/ML (ref 2.3–4.2)
T4 FREE SERPL-MCNC: 0.83 NG/DL (ref 0.71–1.51)
TRIGL SERPL-MCNC: 74 MG/DL (ref 30–150)
TSH SERPL DL<=0.005 MIU/L-ACNC: 3.01 UIU/ML (ref 0.4–4)
WBC # BLD AUTO: 6.45 K/UL (ref 3.9–12.7)

## 2023-11-08 PROCEDURE — 83735 ASSAY OF MAGNESIUM: CPT | Performed by: INTERNAL MEDICINE

## 2023-11-08 PROCEDURE — 80061 LIPID PANEL: CPT | Performed by: INTERNAL MEDICINE

## 2023-11-08 PROCEDURE — 85025 COMPLETE CBC W/AUTO DIFF WBC: CPT | Performed by: INTERNAL MEDICINE

## 2023-11-08 PROCEDURE — 84146 ASSAY OF PROLACTIN: CPT | Performed by: INTERNAL MEDICINE

## 2023-11-08 PROCEDURE — 80053 COMPREHEN METABOLIC PANEL: CPT | Performed by: INTERNAL MEDICINE

## 2023-11-08 PROCEDURE — 84439 ASSAY OF FREE THYROXINE: CPT | Performed by: INTERNAL MEDICINE

## 2023-11-08 PROCEDURE — 83036 HEMOGLOBIN GLYCOSYLATED A1C: CPT | Performed by: INTERNAL MEDICINE

## 2023-11-08 PROCEDURE — 82306 VITAMIN D 25 HYDROXY: CPT | Performed by: INTERNAL MEDICINE

## 2023-11-08 PROCEDURE — 84443 ASSAY THYROID STIM HORMONE: CPT | Performed by: INTERNAL MEDICINE

## 2023-11-08 PROCEDURE — 84481 FREE ASSAY (FT-3): CPT | Performed by: INTERNAL MEDICINE

## 2023-11-08 PROCEDURE — 36415 COLL VENOUS BLD VENIPUNCTURE: CPT | Performed by: INTERNAL MEDICINE

## 2023-11-09 ENCOUNTER — PATIENT MESSAGE (OUTPATIENT)
Dept: INTERNAL MEDICINE | Facility: CLINIC | Age: 63
End: 2023-11-09
Payer: MEDICARE

## 2023-11-09 DIAGNOSIS — R73.03 PREDIABETES: Primary | ICD-10-CM

## 2023-11-20 ENCOUNTER — PATIENT MESSAGE (OUTPATIENT)
Dept: INTERNAL MEDICINE | Facility: CLINIC | Age: 63
End: 2023-11-20
Payer: MEDICARE

## 2023-11-20 RX ORDER — ONDANSETRON 4 MG/1
4 TABLET, FILM COATED ORAL EVERY 8 HOURS PRN
Qty: 15 TABLET | Refills: 0 | Status: SHIPPED | OUTPATIENT
Start: 2023-11-20 | End: 2024-02-29

## 2023-11-20 NOTE — TELEPHONE ENCOUNTER
No care due was identified.  [facility] Embedded Care Due Messages. Reference number: [id number].   11/20/2023 3:22:22 PM CST

## 2023-12-12 ENCOUNTER — OFFICE VISIT (OUTPATIENT)
Dept: INTERNAL MEDICINE | Facility: CLINIC | Age: 63
End: 2023-12-12
Payer: MEDICARE

## 2023-12-12 VITALS
WEIGHT: 207.25 LBS | HEART RATE: 78 BPM | OXYGEN SATURATION: 98 % | DIASTOLIC BLOOD PRESSURE: 78 MMHG | BODY MASS INDEX: 33.45 KG/M2 | SYSTOLIC BLOOD PRESSURE: 120 MMHG

## 2023-12-12 DIAGNOSIS — H66.92 LEFT OTITIS MEDIA, UNSPECIFIED OTITIS MEDIA TYPE: Primary | ICD-10-CM

## 2023-12-12 PROCEDURE — 99214 OFFICE O/P EST MOD 30 MIN: CPT | Mod: S$PBB,,, | Performed by: INTERNAL MEDICINE

## 2023-12-12 PROCEDURE — 99214 OFFICE O/P EST MOD 30 MIN: CPT | Mod: PBBFAC | Performed by: INTERNAL MEDICINE

## 2023-12-12 PROCEDURE — 99999 PR PBB SHADOW E&M-EST. PATIENT-LVL IV: CPT | Mod: PBBFAC,,, | Performed by: INTERNAL MEDICINE

## 2023-12-12 PROCEDURE — 99214 PR OFFICE/OUTPT VISIT, EST, LEVL IV, 30-39 MIN: ICD-10-PCS | Mod: S$PBB,,, | Performed by: INTERNAL MEDICINE

## 2023-12-12 PROCEDURE — 99999 PR PBB SHADOW E&M-EST. PATIENT-LVL IV: ICD-10-PCS | Mod: PBBFAC,,, | Performed by: INTERNAL MEDICINE

## 2023-12-12 RX ORDER — AMOXICILLIN 500 MG/1
500 TABLET, FILM COATED ORAL EVERY 12 HOURS
Qty: 20 TABLET | Refills: 0 | Status: SHIPPED | OUTPATIENT
Start: 2023-12-12 | End: 2023-12-22

## 2023-12-12 RX ORDER — BENZONATATE 200 MG/1
200 CAPSULE ORAL 2 TIMES DAILY PRN
COMMUNITY

## 2023-12-12 RX ORDER — AZITHROMYCIN 250 MG/1
250 TABLET, FILM COATED ORAL DAILY
COMMUNITY
Start: 2023-12-11 | End: 2024-02-26

## 2023-12-12 NOTE — PROGRESS NOTES
Subjective:       Patient ID: Abiola Borjas is a 63 y.o. female.    Chief Complaint: Ear Fullness    Presents for evaluation of ear fullness.     Started with ear pain over the weekend that radiated into her jaw and left upper back. She was also having difficulty hearing out of the ear and it felt stopped up.   She went to urgent care yesterday and was give a z pack which she took yesterday and today and has started to feel better. She can now hear out of her ear and the pain is improving.   Also using mucinex, claritin and flonase.     Ear Fullness   Pertinent negatives include no abdominal pain, coughing, diarrhea, headaches or vomiting.     Review of Systems   Constitutional:  Negative for activity change, appetite change and chills.   HENT:  Positive for ear pain. Negative for sinus pressure/congestion and sneezing.    Respiratory:  Negative for cough and shortness of breath.    Cardiovascular:  Negative for chest pain, palpitations and leg swelling.   Gastrointestinal:  Negative for abdominal distention, abdominal pain, constipation, diarrhea, nausea and vomiting.   Genitourinary:  Negative for dysuria and hematuria.   Musculoskeletal:  Negative for arthralgias, back pain and myalgias.   Neurological:  Negative for dizziness and headaches.   Psychiatric/Behavioral:  Negative for agitation. The patient is not nervous/anxious.            Past Medical History:   Diagnosis Date    Allergy     Anxiety     Colon polyps     Deviated septum     Diverticulosis of sigmoid colon     Focal nodular hyperplasia of liver     H/O bone density study     Normal  @ [facility]    Hormone replacement therapy (HRT)     Hypoglycemia     Hypothyroidism     PONV (postoperative nausea and vomiting)     Snores     Thyroid disease      Past Surgical History:   Procedure Laterality Date    ABDOMINAL HERNIA REPAIR       SECTION      CHOLECYSTECTOMY      COLECTOMY  2015    [facility] for stricture    COLON SURGERY  2015     [facility] for Stricture     COLONOSCOPY  09/2015    Stricture     ENDOMETRIAL ABLATION      HERNIA REPAIR        Patient Active Problem List   Diagnosis    Diverticulosis of sigmoid colon    Anxiety    Focal nodular hyperplasia of liver    Colon perforation    Rectal perforation    Sigmoid stricture    Steatosis of liver    Postmenopausal bleeding    Thickened endometrium    Endometrial polyp        Objective:      Physical Exam  Constitutional:       Appearance: Normal appearance.   HENT:      Head: Normocephalic.   Cardiovascular:      Rate and Rhythm: Normal rate and regular rhythm.      Pulses: Normal pulses.      Heart sounds: Normal heart sounds.   Pulmonary:      Effort: Pulmonary effort is normal.      Breath sounds: Normal breath sounds.   Abdominal:      General: Abdomen is flat. Bowel sounds are normal.      Palpations: Abdomen is soft.   Musculoskeletal:         General: Normal range of motion.      Cervical back: Normal range of motion and neck supple.   Skin:     General: Skin is warm and dry.   Neurological:      General: No focal deficit present.      Mental Status: She is alert and oriented to person, place, and time.   Psychiatric:         Mood and Affect: Mood normal.         Assessment:       Problem List Items Addressed This Visit    None  Visit Diagnoses       Left otitis media, unspecified otitis media type    -  Primary            Plan:         Abiola was seen today for ear fullness.    Diagnoses and all orders for this visit:    Left otitis media, unspecified otitis media type  -     amoxicillin (AMOXIL) 500 MG Tab; Take 1 tablet (500 mg total) by mouth every 12 (twelve) hours. for 10 days  Improving with Z pack   Sent Augmentin in case she does not have continued improvement                  Maritza Alejo MD   Internal Medicine   Primary Care

## 2023-12-20 DIAGNOSIS — Z78.0 MENOPAUSE: ICD-10-CM

## 2024-01-04 ENCOUNTER — NUTRITION (OUTPATIENT)
Dept: NUTRITION | Facility: CLINIC | Age: 64
End: 2024-01-04
Payer: MEDICARE

## 2024-01-04 VITALS — WEIGHT: 208.31 LBS | HEIGHT: 66 IN | BODY MASS INDEX: 33.48 KG/M2

## 2024-01-04 DIAGNOSIS — E66.9 OBESITY (BMI 30-39.9): ICD-10-CM

## 2024-01-04 DIAGNOSIS — Z71.3 DIETARY COUNSELING: ICD-10-CM

## 2024-01-04 DIAGNOSIS — R73.03 PREDIABETES: Primary | ICD-10-CM

## 2024-01-04 PROCEDURE — 99999PBSHW PR PBB SHADOW TECHNICAL ONLY FILED TO HB: Mod: PBBFAC,,,

## 2024-01-04 PROCEDURE — 99214 OFFICE O/P EST MOD 30 MIN: CPT | Mod: PBBFAC,PN

## 2024-01-04 PROCEDURE — 97802 MEDICAL NUTRITION INDIV IN: CPT | Mod: 59,PBBFAC,PN | Performed by: DIETITIAN, REGISTERED

## 2024-01-04 PROCEDURE — 99999 PR PBB SHADOW E&M-EST. PATIENT-LVL IV: CPT | Mod: PBBFAC,,,

## 2024-01-04 NOTE — PROGRESS NOTES
"Referring Physician:César Contreras MD     Reason for visit:  Chief Complaint   Patient presents with    Obesity    prediabetes    Nutrition Counseling             Initial Visit    :1960     Allergies Reviewed  Meds Reviewed    Anthropometrics  Weight:94.5 kg (208 lb 5.4 oz)  Height:5' 6" (1.676 m)  BMI:Body mass index is 33.63 kg/m².   IBW: 59.3 kg  +/-10%    Meds:  Outpatient Medications Prior to Visit   Medication Sig Dispense Refill    ALPRAZolam (XANAX) 0.5 MG tablet Take 0.5 mg by mouth 3 (three) times daily as needed.       azithromycin (Z-SOFIA) 250 MG tablet Take 250 mg by mouth once daily.      benzonatate (TESSALON) 200 MG capsule Take 200 mg by mouth 2 (two) times daily as needed.      cyanocobalamin (VITAMIN B-12) 100 MCG tablet Take 100 mcg by mouth once daily.      CYTOMEL 25 mcg Tab TAKE 1/2 A TABLET BY MOUTH DAILY 45 tablet 5    famotidine (PEPCID) 10 MG tablet Take 10 mg by mouth 2 (two) times daily as needed for Heartburn.      FLUCELVAX QUAD 5628-1013, PF, 60 mcg (15 mcg x 4)/0.5 mL Syrg       fluticasone propionate (FLONASE) 50 mcg/actuation nasal spray 1 spray (50 mcg total) by Each Nostril route once daily. 16 g 2    Lactobacillus rhamnosus GG (CULTURELLE) 10 billion cell capsule Take 1 capsule by mouth once daily.      lancets Misc 1 Device by Misc.(Non-Drug; Combo Route) route 2 (two) times daily. 100 each 11    loratadine (CLARITIN) 10 mg tablet Take 10 mg by mouth once daily.      magnesium 30 mg Tab Take by mouth once. 250 mg twice a day      melatonin 3 mg Tab Take 5 mg by mouth.      multivitamin capsule Take 1 capsule by mouth once daily.      omega-3 fatty acids/fish oil (FISH OIL-OMEGA-3 FATTY ACIDS) 300-1,000 mg capsule Take by mouth once daily.      ondansetron (ZOFRAN) 4 MG tablet Take 1 tablet (4 mg total) by mouth every 8 (eight) hours as needed for Nausea. 15 tablet 0    SYNTHROID 88 mcg tablet Take 1 tablet (88 mcg total) by mouth before breakfast. 90 tablet 3    TRUE " "METRIX GLUCOSE TEST STRIP Strp 1 STRIP BY MISC.(NON-DRUG COMBO ROUTE) ROUTE 2 (TWO) TIMES DAILY. 100 strip 10    vitamin C-multivitamin-mineral 500 mg Chew Take by mouth.      vitamin D (VITAMIN D3) 1000 units Tab Take 1,000 Units by mouth once daily.      zinc gluconate 50 mg tablet Take 50 mg by mouth once daily.       No facility-administered medications prior to visit.       Food/Drug Interactions Noted:  n/a    Vitamins/Supplements/Herbs:  see med list (pt states many of her vitamins are gummies, containing sugars)    Labs:  HgbA1c  5.9     Nutrition Prescription: 1779 Kcals/day( 30 kcal/kg IBW),  59 g protein( 1.0 g/kg IBW)     Support System:  pt lives alone; does her own grocery shopping and cooking.  Eats out frequently.    Diet Hx: pt has done a lot of her own independent research since prediabetes dx in November.  Snacks:  keena crackers with 2% milk; glass of wine with popcorn or pretzels; low fat string cheese; handful of nuts; Activia yogurt; apple with peanut butter, sometimes jelly or banana.  Takes a tsp of honey every day.  Lost 15 lbs on Weight Watchers several years ago; has gradually regained it back.  Does weight herself at home routinely.  Avoids artificial sweeteners.  States she has friends using Ozempic/Mounjaro for weight loss.  States she does sometimes "stress eat".    Breakfast: 2 eggs scrambled with spinach, 1/4-1/2 avocado, handful of blueberries and several pool of pineapple, coffee with unsweetened coconut milk  Lunch:  varies; had chinese food today out to lunch with a friend:  chicken & vegetables, fried rice, 1/2 egg roll.  Or her daughter fixed "healthy" chili she has been eating.  Water or unsweetened tea.  Dinner:  sometimes eats at her daughters, or out with friends, or has leftovers.  Tonight plans to fix salmon and oven roasted broccoli or butternut squash.  Has a glass of wine most nights, with a salty snack ie popcorn/pretzels/cheese crisps/pistachios.    Current " activity level and/or physical limitations:  started walking 2 miles a day last week, and has only missed 2 days.  Plans to continue walking.    Motivation to make changes/anticipated barriers and/or expected adherence:  no barriers to adherence identified    Nutrition-Focus Physical Findings:  pt appears well nourished    Assessment:  pt attentive and asked numerous relevant questions about foods/beverages recommended and to avoid; reading food labels; sample meal plan and menus; portion control; healthy snacking; grocery shopping, cooking and eating out tips; role of exercise in blood glucose control and weight management.  All of her questions were answered, and she verbalized understanding of information.    Nutrition Diagnosis: Food- and nutrition-related knowledge deficit RT lack of prior exposure to information AEB dx prediabetes/obesity      Recommendations:  1500 calorie, low fat, high fiber diet. Exercise goal:  30 minutes per day, 3-5 days per week.  Handouts provided and reviewed:  My Plate Planner; Cardiac-TLC Nutrition Therapy; 1500 Calorie Sample 5-Day Menus; Servings of Carbohydrates for Meal Planning; Reading A Food Label; Tips to Support Weight Loss; Weight Loss Tips, Heart-Healthy Cooking Tips, Heart Healthy Shopping Tips;  Eat Fit Plan...Anytime/Anywhere; Shop Fit-Get Fit Shopping List; [facility] Snack List for Diabetes; Exercise & Weight-Losing It and Keeping It Off (NLA); Achieving A Healthy Weight (NLA); Setting Goals for Weight Management; Eat Fit zaira info    Strategies Implemented:  portion control; read food labels; keep a food journal    Consultation Time:60 minutes.  Communicated with referring healthcare provider:  Consult note available in pt's Epic chart per MD discretion  Follow Up:  Pt provided with dietitian contact information and advised to contact me with questions or make future appointment if further intervention needed.

## 2024-01-31 ENCOUNTER — HOSPITAL ENCOUNTER (OUTPATIENT)
Dept: RADIOLOGY | Facility: HOSPITAL | Age: 64
Discharge: HOME | End: 2024-01-31
Attending: INTERNAL MEDICINE
Payer: MEDICARE

## 2024-01-31 VITALS — HEIGHT: 66 IN | WEIGHT: 208 LBS | BODY MASS INDEX: 33.43 KG/M2

## 2024-01-31 DIAGNOSIS — Z12.31 ENCOUNTER FOR SCREENING MAMMOGRAM FOR BREAST CANCER: ICD-10-CM

## 2024-01-31 PROCEDURE — 77067 SCR MAMMO BI INCL CAD: CPT | Mod: 26,,, | Performed by: RADIOLOGY

## 2024-01-31 PROCEDURE — 77067 SCR MAMMO BI INCL CAD: CPT | Mod: TC

## 2024-01-31 PROCEDURE — 77063 BREAST TOMOSYNTHESIS BI: CPT | Mod: 26,,, | Performed by: RADIOLOGY

## 2024-02-22 ENCOUNTER — PATIENT MESSAGE (OUTPATIENT)
Dept: INTERNAL MEDICINE | Facility: CLINIC | Age: 64
End: 2024-02-22
Payer: MEDICARE

## 2024-02-26 ENCOUNTER — LAB VISIT (OUTPATIENT)
Dept: LAB | Facility: HOSPITAL | Age: 64
End: 2024-02-26
Attending: INTERNAL MEDICINE
Payer: MEDICARE

## 2024-02-26 ENCOUNTER — OFFICE VISIT (OUTPATIENT)
Dept: INTERNAL MEDICINE | Facility: CLINIC | Age: 64
End: 2024-02-26
Payer: MEDICARE

## 2024-02-26 VITALS
BODY MASS INDEX: 32.13 KG/M2 | HEIGHT: 66 IN | WEIGHT: 199.94 LBS | DIASTOLIC BLOOD PRESSURE: 84 MMHG | SYSTOLIC BLOOD PRESSURE: 126 MMHG | OXYGEN SATURATION: 99 % | HEART RATE: 72 BPM

## 2024-02-26 DIAGNOSIS — Z86.010 HX OF ADENOMATOUS COLONIC POLYPS: ICD-10-CM

## 2024-02-26 DIAGNOSIS — E03.9 HYPOTHYROIDISM, UNSPECIFIED TYPE: ICD-10-CM

## 2024-02-26 DIAGNOSIS — D17.9 LIPOMA, UNSPECIFIED SITE: ICD-10-CM

## 2024-02-26 DIAGNOSIS — J06.9 VIRAL URI: Primary | ICD-10-CM

## 2024-02-26 DIAGNOSIS — H65.91 RIGHT OTITIS MEDIA WITH EFFUSION: ICD-10-CM

## 2024-02-26 DIAGNOSIS — J06.9 VIRAL URI: ICD-10-CM

## 2024-02-26 LAB
BASOPHILS # BLD AUTO: 0.04 K/UL (ref 0–0.2)
BASOPHILS NFR BLD: 0.4 % (ref 0–1.9)
CRP SERPL-MCNC: 1.4 MG/L (ref 0–8.2)
DIFFERENTIAL METHOD BLD: NORMAL
EOSINOPHIL # BLD AUTO: 0.1 K/UL (ref 0–0.5)
EOSINOPHIL NFR BLD: 1 % (ref 0–8)
ERYTHROCYTE [DISTWIDTH] IN BLOOD BY AUTOMATED COUNT: 12.7 % (ref 11.5–14.5)
GROUP A STREP, MOLECULAR: NEGATIVE
HCT VFR BLD AUTO: 41.5 % (ref 37–48.5)
HGB BLD-MCNC: 13.6 G/DL (ref 12–16)
IMM GRANULOCYTES # BLD AUTO: 0.03 K/UL (ref 0–0.04)
IMM GRANULOCYTES NFR BLD AUTO: 0.3 % (ref 0–0.5)
LYMPHOCYTES # BLD AUTO: 2.3 K/UL (ref 1–4.8)
LYMPHOCYTES NFR BLD: 26.2 % (ref 18–48)
MCH RBC QN AUTO: 29.1 PG (ref 27–31)
MCHC RBC AUTO-ENTMCNC: 32.8 G/DL (ref 32–36)
MCV RBC AUTO: 89 FL (ref 82–98)
MONOCYTES # BLD AUTO: 0.5 K/UL (ref 0.3–1)
MONOCYTES NFR BLD: 5.2 % (ref 4–15)
NEUTROPHILS # BLD AUTO: 6 K/UL (ref 1.8–7.7)
NEUTROPHILS NFR BLD: 66.9 % (ref 38–73)
NRBC BLD-RTO: 0 /100 WBC
PLATELET # BLD AUTO: 308 K/UL (ref 150–450)
PMV BLD AUTO: 10 FL (ref 9.2–12.9)
RBC # BLD AUTO: 4.67 M/UL (ref 4–5.4)
WBC # BLD AUTO: 8.9 K/UL (ref 3.9–12.7)

## 2024-02-26 PROCEDURE — 85025 COMPLETE CBC W/AUTO DIFF WBC: CPT | Performed by: INTERNAL MEDICINE

## 2024-02-26 PROCEDURE — 99214 OFFICE O/P EST MOD 30 MIN: CPT | Mod: S$PBB,,, | Performed by: INTERNAL MEDICINE

## 2024-02-26 PROCEDURE — 86140 C-REACTIVE PROTEIN: CPT | Performed by: INTERNAL MEDICINE

## 2024-02-26 PROCEDURE — 99999 PR PBB SHADOW E&M-EST. PATIENT-LVL IV: CPT | Mod: PBBFAC,,, | Performed by: INTERNAL MEDICINE

## 2024-02-26 PROCEDURE — 87651 STREP A DNA AMP PROBE: CPT | Performed by: INTERNAL MEDICINE

## 2024-02-26 PROCEDURE — 36415 COLL VENOUS BLD VENIPUNCTURE: CPT | Performed by: INTERNAL MEDICINE

## 2024-02-26 PROCEDURE — 99214 OFFICE O/P EST MOD 30 MIN: CPT | Mod: PBBFAC | Performed by: INTERNAL MEDICINE

## 2024-02-26 RX ORDER — AZITHROMYCIN 250 MG/1
250 TABLET, FILM COATED ORAL DAILY
Status: CANCELLED | OUTPATIENT
Start: 2024-02-26

## 2024-02-26 RX ORDER — AZELASTINE 1 MG/ML
2 SPRAY, METERED NASAL 2 TIMES DAILY
Qty: 30 ML | Refills: 0 | Status: SHIPPED | OUTPATIENT
Start: 2024-02-26

## 2024-02-26 NOTE — TELEPHONE ENCOUNTER
She asked for this when I was walking her out. I told her I'd ask you about it and let her know if you'll send it in or not.

## 2024-02-26 NOTE — PROGRESS NOTES
MEDICAL HISTORY:  Hypothyroid disease.  Pre diabetes  Fatty liver with focal nodular hyperplasia.  Anxiety disorder.  Cholecystectomy with pancreatitis.   x3.  Abdominal hernia repair.  Diverticula disease of the colon, for which she had in , a sigmoid colectomy due to diverticulitis, then a stent put in a month later because of microperforation. In 2015, she underwent a balloon dilatation due to stenosis of the colonic anastomosis        Oncology History Overview Note   Diverticulitis   1. 2014: Surgery - Lap segmental resection of sigmoid cancer  2. Complicated by anastomotic leak and subsequent stricture managed conservatively with IR drain and repeat balloon dilations  3. 12/7/15: Robo lap exploration, laparoscopic hand-assisted redo low anterior resection, hand assisted omental advancement flap, cysto and stents     SOCIAL HISTORY:  Tobacco use and alcohol use -- none.       Medications  Xanax 0.5 mg 3 times daily   cytomel 25 mcg half a tablet daily   Levothyroxine 0.088 mg daily   Flonase 2 puffs daily   Claritin once a day  Over-the-counter Pepcid as needed    [age] year female       Follow-up visit   For the past 5 days she has been having congested in his head throat irritated is where of a postnasal drip.  She has been up white mucus.  Today she feels little bit better    It is also noted that in December she started having fullness in his left ear with hearing loss.  She went to ENT.  Apparently received steroid injection in the ear.  Was told that this was a virus.  Had a hearing test which showed hearing loss.  After the steroid shots to hearing slightly improved.  She has tinnitus.  Also had an MRI the brain which did not reveal any abnormal findings.  That has no dizziness or nausea    Also recently went to [facility].  She went day years ago after having:  Procedure.  She had a colonoscopy which revealed multiple adenomatous or serrated polyps.  There was spasming of  the colon it was not totally complete she is going to have a repeat in April.  She also had an upper endoscopy given history of belching and GERD.  No abnormal findings were seen.      She also had a derm appointment.  Tender keratosis was taking off the left forearm.  But she was told that she needed to have an ultrasound of her back because of the lipoma on the left side and multiple lipomas      Presently she reports no chest pain palpitations shortness a breath.  No abdominal pain.  Regular bowel function no difficulty urinating other than urgency    Examination   Weight 199   BMI 32.27   Pulse 72   Blood pressure 128/72   Serous effusion left tympanic membranes  Nasal mucosa is edematous  Oropharynx hyperemic   Neck palpable right submandibular lymph gland  Chest clear breath sounds  Heart regular rate rhythm   Abdominal exam soft nontender  That is soft tissue fullness prominent on the left lateral thoracic area.    Impression   Upper respiratory infection, viral   Left serous otitis media   Adenomatous colon polyps   Hypothyroid  Lipoma

## 2024-02-27 ENCOUNTER — PATIENT MESSAGE (OUTPATIENT)
Dept: INTERNAL MEDICINE | Facility: CLINIC | Age: 64
End: 2024-02-27
Payer: MEDICARE

## 2024-02-29 ENCOUNTER — LAB VISIT (OUTPATIENT)
Dept: LAB | Facility: HOSPITAL | Age: 64
End: 2024-02-29
Attending: INTERNAL MEDICINE
Payer: MEDICARE

## 2024-02-29 ENCOUNTER — TELEPHONE (OUTPATIENT)
Dept: INTERNAL MEDICINE | Facility: CLINIC | Age: 64
End: 2024-02-29
Payer: MEDICARE

## 2024-02-29 ENCOUNTER — OFFICE VISIT (OUTPATIENT)
Dept: INTERNAL MEDICINE | Facility: CLINIC | Age: 64
End: 2024-02-29
Payer: MEDICARE

## 2024-02-29 VITALS
OXYGEN SATURATION: 99 % | HEART RATE: 80 BPM | SYSTOLIC BLOOD PRESSURE: 118 MMHG | WEIGHT: 199.94 LBS | BODY MASS INDEX: 32.13 KG/M2 | HEIGHT: 66 IN | DIASTOLIC BLOOD PRESSURE: 82 MMHG

## 2024-02-29 DIAGNOSIS — R73.9 HYPERGLYCEMIA: ICD-10-CM

## 2024-02-29 DIAGNOSIS — E03.9 HYPOTHYROIDISM, UNSPECIFIED TYPE: ICD-10-CM

## 2024-02-29 DIAGNOSIS — G93.31 POSTVIRAL FATIGUE SYNDROME: Primary | ICD-10-CM

## 2024-02-29 DIAGNOSIS — G93.31 POSTVIRAL FATIGUE SYNDROME: ICD-10-CM

## 2024-02-29 LAB
ANION GAP SERPL CALC-SCNC: 10 MMOL/L (ref 8–16)
BUN SERPL-MCNC: 17 MG/DL (ref 8–23)
CALCIUM SERPL-MCNC: 9.4 MG/DL (ref 8.7–10.5)
CHLORIDE SERPL-SCNC: 103 MMOL/L (ref 95–110)
CO2 SERPL-SCNC: 26 MMOL/L (ref 23–29)
CREAT SERPL-MCNC: 0.8 MG/DL (ref 0.5–1.4)
CTP QC/QA: YES
CTP QC/QA: YES
EST. GFR  (NO RACE VARIABLE): >60 ML/MIN/1.73 M^2
ESTIMATED AVG GLUCOSE: 114 MG/DL (ref 68–131)
GLUCOSE SERPL-MCNC: 102 MG/DL (ref 70–110)
HBA1C MFR BLD: 5.6 % (ref 4–5.6)
HETEROPH AB SERPL QL IA: NEGATIVE
POC MOLECULAR INFLUENZA A AGN: NEGATIVE
POC MOLECULAR INFLUENZA B AGN: NEGATIVE
POTASSIUM SERPL-SCNC: 4.5 MMOL/L (ref 3.5–5.1)
SARS-COV-2 RDRP RESP QL NAA+PROBE: NEGATIVE
SODIUM SERPL-SCNC: 139 MMOL/L (ref 136–145)
T3FREE SERPL-MCNC: 3.3 PG/ML (ref 2.3–4.2)
T4 FREE SERPL-MCNC: 0.96 NG/DL (ref 0.71–1.51)
TSH SERPL DL<=0.005 MIU/L-ACNC: 2.21 UIU/ML (ref 0.4–4)

## 2024-02-29 PROCEDURE — 36415 COLL VENOUS BLD VENIPUNCTURE: CPT | Performed by: INTERNAL MEDICINE

## 2024-02-29 PROCEDURE — 83036 HEMOGLOBIN GLYCOSYLATED A1C: CPT | Performed by: INTERNAL MEDICINE

## 2024-02-29 PROCEDURE — 84443 ASSAY THYROID STIM HORMONE: CPT | Performed by: INTERNAL MEDICINE

## 2024-02-29 PROCEDURE — 86308 HETEROPHILE ANTIBODY SCREEN: CPT | Performed by: INTERNAL MEDICINE

## 2024-02-29 PROCEDURE — 99214 OFFICE O/P EST MOD 30 MIN: CPT | Mod: S$PBB,,, | Performed by: INTERNAL MEDICINE

## 2024-02-29 PROCEDURE — 99999PBSHW POCT INFLUENZA A/B MOLECULAR: Mod: PBBFAC,,,

## 2024-02-29 PROCEDURE — 84481 FREE ASSAY (FT-3): CPT | Performed by: INTERNAL MEDICINE

## 2024-02-29 PROCEDURE — 99999 PR PBB SHADOW E&M-EST. PATIENT-LVL IV: CPT | Mod: PBBFAC,,, | Performed by: INTERNAL MEDICINE

## 2024-02-29 PROCEDURE — 99214 OFFICE O/P EST MOD 30 MIN: CPT | Mod: PBBFAC | Performed by: INTERNAL MEDICINE

## 2024-02-29 PROCEDURE — 80048 BASIC METABOLIC PNL TOTAL CA: CPT | Performed by: INTERNAL MEDICINE

## 2024-02-29 PROCEDURE — 87502 INFLUENZA DNA AMP PROBE: CPT | Mod: PBBFAC | Performed by: INTERNAL MEDICINE

## 2024-02-29 PROCEDURE — 87635 SARS-COV-2 COVID-19 AMP PRB: CPT | Mod: PBBFAC | Performed by: INTERNAL MEDICINE

## 2024-02-29 PROCEDURE — 99999PBSHW: Mod: PBBFAC,,,

## 2024-02-29 PROCEDURE — 84439 ASSAY OF FREE THYROXINE: CPT | Performed by: INTERNAL MEDICINE

## 2024-02-29 RX ORDER — AZITHROMYCIN 250 MG/1
TABLET, FILM COATED ORAL
Qty: 6 TABLET | Refills: 0 | Status: SHIPPED | OUTPATIENT
Start: 2024-02-29

## 2024-02-29 RX ORDER — ONDANSETRON 4 MG/1
4 TABLET, ORALLY DISINTEGRATING ORAL EVERY 8 HOURS PRN
Qty: 20 TABLET | Refills: 0 | Status: SHIPPED | OUTPATIENT
Start: 2024-02-29

## 2024-02-29 NOTE — PROGRESS NOTES
MEDICAL HISTORY:  Hypothyroid disease.  Pre diabetes  Fatty liver with focal nodular hyperplasia.  Anxiety disorder.  Cholecystectomy with pancreatitis.   x3.  Abdominal hernia repair.  Diverticula disease of the colon, for which she had in , a sigmoid colectomy due to diverticulitis, then a stent put in a month later because of microperforation. In 2015, she underwent a balloon dilatation due to stenosis of the colonic anastomosis        Oncology History Overview Note   Diverticulitis   1. 2014: Surgery - Lap segmental resection of sigmoid cancer  2. Complicated by anastomotic leak and subsequent stricture managed conservatively with IR drain and repeat balloon dilations  3. 12/7/15: Robo lap exploration, laparoscopic hand-assisted redo low anterior resection, hand assisted omental advancement flap, cysto and stents     SOCIAL HISTORY:  Tobacco use and alcohol use -- none.       Medications  Xanax 0.5 mg 3 times daily   cytomel 25 mcg half a tablet daily   Levothyroxine 0.088 mg daily   Flonase 2 puffs daily   Claritin once a day  Over-the-counter Pepcid as needed      [age] year female.  She was seen by me  for viral syndrome.  Phos swab for strep was negative.  But since that time she is felt extremely fatigued and tired although it is better today   Astelin nasal sprays been prescribed.  She feels that she has draining a breaking it up better in his sense that she has having a postnasal drip and coughing up clear mucus.  The next day she had more of a sore throat right ear pain but this is now passed.  She has not running fever chills.  She does not feel short of breath in his no chest pain.    Today nasal swab for both COVID influenza her request was negative.  She inquired about that of mononucleosis although the CBC on  did not reveal a lymphocytosis    Examination   Weight 199 lb   Pulse 80   Blood pressure 120/72   Both tympanic membranes look normal  minimal serous effusion in his left ear but this looks better   Nasal mucosa slightly edematous  Oropharynx no abnormal findings not erythematous no exudates   Neck no palpable adenopathy  Chest clear breath sounds  Heart regular rate rhythm    Impression   Fatigue probably that of post viral fatigue  Hypothyroid   Hyperglycemia    Plan   Today TSH free T4 free T3, Monospot, chemistry, hemoglobin A1c

## 2024-03-01 ENCOUNTER — PATIENT MESSAGE (OUTPATIENT)
Dept: INTERNAL MEDICINE | Facility: CLINIC | Age: 64
End: 2024-03-01
Payer: MEDICARE

## 2024-03-06 ENCOUNTER — PATIENT OUTREACH (OUTPATIENT)
Dept: ADMINISTRATIVE | Facility: HOSPITAL | Age: 64
End: 2024-03-06
Payer: MEDICARE

## 2024-03-06 NOTE — PROGRESS NOTES
Uploaded skin pathology report. Chart reviewed and updated.    Rebecca Sanchez LPN   Clinical Care Coordinator  Primary Care and Wellness

## 2024-03-15 ENCOUNTER — TELEPHONE (OUTPATIENT)
Dept: OBSTETRICS AND GYNECOLOGY | Facility: CLINIC | Age: 64
End: 2024-03-15
Payer: MEDICARE

## 2024-04-08 RX ORDER — LEVOTHYROXINE SODIUM 88 UG/1
88 TABLET ORAL
Qty: 90 TABLET | Refills: 3 | Status: SHIPPED | OUTPATIENT
Start: 2024-04-08

## 2024-04-09 NOTE — TELEPHONE ENCOUNTER
No care due was identified.  [facility] Embedded Care Due Messages. Reference number: [id number].   4/08/2024 9:11:22 PM CDT

## 2024-04-09 NOTE — TELEPHONE ENCOUNTER
Refill Decision Note   Abiola Borjas  is requesting a refill authorization.  Brief Assessment and Rationale for Refill:  Approve     Medication Therapy Plan:         Alert overridden per protocol: Yes   Comments:     Note composed:11:07 PM 04/08/2024

## 2024-04-11 ENCOUNTER — PATIENT MESSAGE (OUTPATIENT)
Dept: INTERNAL MEDICINE | Facility: CLINIC | Age: 64
End: 2024-04-11
Payer: MEDICARE

## 2024-04-30 ENCOUNTER — TELEPHONE (OUTPATIENT)
Dept: INTERNAL MEDICINE | Facility: CLINIC | Age: 64
End: 2024-04-30
Payer: MEDICARE

## 2024-04-30 NOTE — TELEPHONE ENCOUNTER
Called patient back to let her know she does not need to have an appointment to have this vaccine, she can walk into any [facility] pharmacy at her convenience to receive it. No answer, left vm explaining this. Stated she can call back if she had any questions.

## 2024-04-30 NOTE — TELEPHONE ENCOUNTER
----- Message from Karoline Abreu sent at 4/30/2024  1:42 PM CDT -----  Contact: [phone]  Per pt, she would like to be scheduled for a Hepatitis B shot.  Pt would like to have this done on tomorrow.     Per pt, it is not her daughter that needs the shot. She(the pt) is the one who needs it.             Thank you

## 2024-06-10 ENCOUNTER — PATIENT MESSAGE (OUTPATIENT)
Dept: INTERNAL MEDICINE | Facility: CLINIC | Age: 64
End: 2024-06-10
Payer: MEDICARE

## 2024-06-20 NOTE — PROGRESS NOTES
MEDICAL HISTORY:  Hypothyroid disease.  Pre diabetes  Fatty liver with focal nodular hyperplasia.  Anxiety disorder.  Cholecystectomy with pancreatitis.   x3.  Abdominal hernia repair.  Diverticula disease of the colon, for which she had in , a sigmoid colectomy due to diverticulitis, then a stent put in a month later because of microperforation. In 2015, she underwent a balloon dilatation due to stenosis of the colonic anastomosis        Oncology History Overview Note   Diverticulitis   1. 2014: Surgery - Lap segmental resection of sigmoid cancer  2. Complicated by anastomotic leak and subsequent stricture managed conservatively with IR drain and repeat balloon dilations  3. 12/7/15: Robo lap exploration, laparoscopic hand-assisted redo low anterior resection, hand assisted omental advancement flap, cysto and stents     SOCIAL HISTORY:  Tobacco use and alcohol use -- none.       Medications  Xanax 0.5 mg 3 times daily   cytomel 25 mcg half a tablet daily   Levothyroxine 0.088 mg daily   Flonase 2 puffs daily   Claritin once a day  Over-the-counter Pepcid as needed      .  [age] year female started around  she has not been feeling well been feeling tired and fatigued.  She was been notice to be congested in his head in his times notice swollen glands underneath the neck.  Eventually she was started noticing a different feeling in his left ear it was not a pain but diminished hearing just feels different.  Actually in 2023 she was seen by ENT for hearing impairment the left ear and was diagnosed with high-frequency hearing loss felt to be due to virus.    She eventually saw the ENT yesterday and was diagnosed with low pitch hearing impairment.  She was told that there was no fluid behind the ears.  She was prescribed Zithromax Z-Rito and then prednisone which will be 40 mg for 2 days, 30 mg for 2 days, 20 mg for 2 days, 10 mg for 2 days.  She was given prescription  for Valium just in case she got vertigo.    She still feels weak.  That is some degree of congestion in her head.  That is swollen glands come and go.    That has no shortness a breath or chest pain.    Another concern that is today she went for a pedicure and was told of a swelling involving the lateral aspect of the right ankle.    Examination   Weight 200 lb   BMI 32.31  Pulse 96   Blood pressure 142/64   Pulse ox 95-96%  Chest clear breath sounds no wheezes or rales   Heart regular rate rhythm   Tympanic membranes normal maybe minimally hyperemic on the left.  Nasal mucosa is clear   Oropharynx normal findings  Neck slight prominence of the submandibular lymph glands but no cervical adenopathy  Extremities trace pretibial edema laterally on the right when compared to the left    Impression   Upper respiratory infection  Vestibulitis  Lower extremity edema doubt any clinical significance  Hypothyroid on supplementation     Plan for now proceed with prednisone and Zithromax Z-Rito.  Labs of CBC basic metabolic profile, TSH BNP  Pending test results may consider short course of the diuretic for few days

## 2024-06-21 ENCOUNTER — OFFICE VISIT (OUTPATIENT)
Dept: INTERNAL MEDICINE | Facility: CLINIC | Age: 64
End: 2024-06-21
Payer: MEDICARE

## 2024-06-21 ENCOUNTER — LAB VISIT (OUTPATIENT)
Dept: LAB | Facility: HOSPITAL | Age: 64
End: 2024-06-21
Attending: INTERNAL MEDICINE
Payer: MEDICARE

## 2024-06-21 VITALS
BODY MASS INDEX: 32.17 KG/M2 | SYSTOLIC BLOOD PRESSURE: 120 MMHG | WEIGHT: 200.19 LBS | OXYGEN SATURATION: 98 % | HEART RATE: 102 BPM | DIASTOLIC BLOOD PRESSURE: 84 MMHG | HEIGHT: 66 IN

## 2024-06-21 DIAGNOSIS — H83.02 VESTIBULITIS OF EAR, LEFT: ICD-10-CM

## 2024-06-21 DIAGNOSIS — E03.9 HYPOTHYROIDISM, UNSPECIFIED TYPE: ICD-10-CM

## 2024-06-21 DIAGNOSIS — R60.0 BILATERAL LEG EDEMA: ICD-10-CM

## 2024-06-21 DIAGNOSIS — Z79.899 OTHER LONG TERM (CURRENT) DRUG THERAPY: ICD-10-CM

## 2024-06-21 DIAGNOSIS — J06.9 UPPER RESPIRATORY TRACT INFECTION, UNSPECIFIED TYPE: ICD-10-CM

## 2024-06-21 DIAGNOSIS — J06.9 UPPER RESPIRATORY TRACT INFECTION, UNSPECIFIED TYPE: Primary | ICD-10-CM

## 2024-06-21 LAB
25(OH)D3+25(OH)D2 SERPL-MCNC: 85 NG/ML (ref 30–96)
ANION GAP SERPL CALC-SCNC: 8 MMOL/L (ref 8–16)
BASOPHILS # BLD AUTO: 0.02 K/UL (ref 0–0.2)
BASOPHILS NFR BLD: 0.2 % (ref 0–1.9)
BNP SERPL-MCNC: 11 PG/ML (ref 0–99)
BUN SERPL-MCNC: 15 MG/DL (ref 8–23)
CALCIUM SERPL-MCNC: 9.8 MG/DL (ref 8.7–10.5)
CHLORIDE SERPL-SCNC: 104 MMOL/L (ref 95–110)
CO2 SERPL-SCNC: 25 MMOL/L (ref 23–29)
CREAT SERPL-MCNC: 0.7 MG/DL (ref 0.5–1.4)
DIFFERENTIAL METHOD BLD: ABNORMAL
EOSINOPHIL # BLD AUTO: 0 K/UL (ref 0–0.5)
EOSINOPHIL NFR BLD: 0 % (ref 0–8)
ERYTHROCYTE [DISTWIDTH] IN BLOOD BY AUTOMATED COUNT: 12.9 % (ref 11.5–14.5)
EST. GFR  (NO RACE VARIABLE): >60 ML/MIN/1.73 M^2
GLUCOSE SERPL-MCNC: 157 MG/DL (ref 70–110)
HCT VFR BLD AUTO: 41.3 % (ref 37–48.5)
HGB BLD-MCNC: 13.4 G/DL (ref 12–16)
IMM GRANULOCYTES # BLD AUTO: 0.03 K/UL (ref 0–0.04)
IMM GRANULOCYTES NFR BLD AUTO: 0.3 % (ref 0–0.5)
LYMPHOCYTES # BLD AUTO: 1.3 K/UL (ref 1–4.8)
LYMPHOCYTES NFR BLD: 14.4 % (ref 18–48)
MCH RBC QN AUTO: 29.1 PG (ref 27–31)
MCHC RBC AUTO-ENTMCNC: 32.4 G/DL (ref 32–36)
MCV RBC AUTO: 90 FL (ref 82–98)
MONOCYTES # BLD AUTO: 0.1 K/UL (ref 0.3–1)
MONOCYTES NFR BLD: 1.5 % (ref 4–15)
NEUTROPHILS # BLD AUTO: 7.3 K/UL (ref 1.8–7.7)
NEUTROPHILS NFR BLD: 83.6 % (ref 38–73)
NRBC BLD-RTO: 0 /100 WBC
PLATELET # BLD AUTO: 317 K/UL (ref 150–450)
PMV BLD AUTO: 10.3 FL (ref 9.2–12.9)
POTASSIUM SERPL-SCNC: 4.1 MMOL/L (ref 3.5–5.1)
RBC # BLD AUTO: 4.61 M/UL (ref 4–5.4)
SODIUM SERPL-SCNC: 137 MMOL/L (ref 136–145)
TSH SERPL DL<=0.005 MIU/L-ACNC: 1.08 UIU/ML (ref 0.4–4)
WBC # BLD AUTO: 8.76 K/UL (ref 3.9–12.7)

## 2024-06-21 PROCEDURE — 83036 HEMOGLOBIN GLYCOSYLATED A1C: CPT | Performed by: INTERNAL MEDICINE

## 2024-06-21 PROCEDURE — 82306 VITAMIN D 25 HYDROXY: CPT | Performed by: INTERNAL MEDICINE

## 2024-06-21 PROCEDURE — 80048 BASIC METABOLIC PNL TOTAL CA: CPT | Performed by: INTERNAL MEDICINE

## 2024-06-21 PROCEDURE — 83880 ASSAY OF NATRIURETIC PEPTIDE: CPT | Performed by: INTERNAL MEDICINE

## 2024-06-21 PROCEDURE — 85025 COMPLETE CBC W/AUTO DIFF WBC: CPT | Performed by: INTERNAL MEDICINE

## 2024-06-21 PROCEDURE — 36415 COLL VENOUS BLD VENIPUNCTURE: CPT | Performed by: INTERNAL MEDICINE

## 2024-06-21 PROCEDURE — 99999 PR PBB SHADOW E&M-EST. PATIENT-LVL IV: CPT | Mod: PBBFAC,,, | Performed by: INTERNAL MEDICINE

## 2024-06-21 PROCEDURE — 84443 ASSAY THYROID STIM HORMONE: CPT | Performed by: INTERNAL MEDICINE

## 2024-06-21 PROCEDURE — 99214 OFFICE O/P EST MOD 30 MIN: CPT | Mod: PBBFAC | Performed by: INTERNAL MEDICINE

## 2024-06-21 PROCEDURE — 99214 OFFICE O/P EST MOD 30 MIN: CPT | Mod: S$PBB,,, | Performed by: INTERNAL MEDICINE

## 2024-06-21 RX ORDER — PREDNISONE 10 MG/1
TABLET ORAL
COMMUNITY
Start: 2024-06-20

## 2024-06-21 RX ORDER — DIAZEPAM 2 MG/1
2 TABLET ORAL 3 TIMES DAILY
COMMUNITY
Start: 2024-06-20

## 2024-06-21 RX ORDER — ACETAMINOPHEN 500 MG
1000 TABLET ORAL
COMMUNITY

## 2024-06-22 LAB
ESTIMATED AVG GLUCOSE: 114 MG/DL (ref 68–131)
HBA1C MFR BLD: 5.6 % (ref 4–5.6)

## 2024-06-25 ENCOUNTER — PATIENT MESSAGE (OUTPATIENT)
Dept: INTERNAL MEDICINE | Facility: CLINIC | Age: 64
End: 2024-06-25
Payer: MEDICARE

## 2024-06-25 ENCOUNTER — NURSE TRIAGE (OUTPATIENT)
Dept: ADMINISTRATIVE | Facility: CLINIC | Age: 64
End: 2024-06-25
Payer: MEDICARE

## 2024-06-25 NOTE — TELEPHONE ENCOUNTER
Patient states her blood glucose reading is 223 mg dL. Patient states she is Pre-Diabetic and is currently on a steroid taper. Patient states her reading was measured after eating a meal of chicken, cheese and a muffin and her current reading is 195 mg/dL.     Care Advice given per Diabetes - High Blood Sugar -  Adult Guideline. Patient also advised to contact the [facility] for any worsening symptoms. Patient states understanding of care advice.     Reason for Disposition   Blood glucose  mg/dL (3.9 -13.3 mmol/L)    Additional Information   Negative: Unconscious or difficult to awaken   Negative: Acting confused (e.g., disoriented, slurred speech)   Negative: Very weak (e.g., can't stand)   Negative: Sounds like a life-threatening emergency to the triager   Negative: [1] Vomiting AND [2] signs of dehydration (e.g., very dry mouth, lightheaded, dark urine)   Negative: [1] Blood glucose > 240 mg/dL (13.3 mmol/L) AND [2] rapid breathing   Negative: Blood glucose > 500 mg/dL (27.8 mmol/L)   Negative: [1] Blood glucose > 240 mg/dL (13.3 mmol/L) AND [2] urine ketones moderate-large (or more than 1+)   Negative: [1] Blood glucose > 240 mg/dL (13.3 mmol/L) AND [2] blood ketones > 1.4 mmol/L   Negative: [1] Blood glucose > 240 mg/dL (13.3 mmol/L) AND [2] vomiting AND [3] unable to check for ketones (in blood or urine)   Negative: [1] New-onset diabetes suspected (e.g., frequent urination, weak, weight loss) AND [2] vomiting or rapid breathing   Negative: Vomiting lasts > 4 hours   Negative: Patient sounds very sick or weak to the triager   Negative: Fever > 100.4 F (38.0 C)   Negative: Blood glucose > 400 mg/dL (22.2 mmol/L)   Negative: [1] Blood glucose > 300 mg/dL (16.7 mmol/L) AND [2] two or more times in a row   Negative: Urine ketones moderate - large (or blood ketones > 1.4 mmol/L)   Negative: [1] Symptoms of high blood sugar (e.g., increased thirst, frequent urination, weight loss) AND [2] not able  to test blood glucose AND [3] pregnant   Negative: [1] Caller has URGENT medication or insulin device (e.g., pump, continuous monitoring) question AND [2] triager unable to answer question   Negative: [1] Blood glucose > 240 mg/dL (13.3 mmol/L) AND [2] pregnant   Negative: [1] Symptoms of high blood sugar (e.g., increased thirst, frequent urination, weight loss) AND [2] not able to test blood glucose   Negative: New-onset diabetes suspected (e.g., increased thirst, frequent urination, weight loss)   Negative: [1] Caller has NON-URGENT medication or insulin device (e.g., pump, continuous monitoring) question AND [2] triager unable to answer question   Negative: [1] Blood glucose > 300 mg/dL (16.7 mmol/L) AND [2] uses insulin (e.g., insulin-dependent, all people with type 1 diabetes)   Negative: [1] Blood glucose 240 - 300 mg/dL (13.3 - 16.7 mmol/L) AND [2] uses insulin (e.g., insulin-dependent, all people with type 1 diabetes)   Negative: [1] Blood glucose > 300 mg/dL (16.7 mmol/L) AND [2] does not  use insulin (e.g., not insulin-dependent; most people with type 2 diabetes)   Negative: [1] Blood glucose 240 - 300 mg/dL (13.3 - 16.7 mmol/L) AND [2] does not  use insulin (e.g., not insulin-dependent; most people with type 2 diabetes)    Protocols used: Diabetes - High Blood Sugar-A-

## 2024-06-26 ENCOUNTER — TELEPHONE (OUTPATIENT)
Dept: INTERNAL MEDICINE | Facility: CLINIC | Age: 64
End: 2024-06-26
Payer: MEDICARE

## 2024-06-26 NOTE — TELEPHONE ENCOUNTER
Pt is requesting a call back regarding blood work and sugar levels. Pt states sh would like to discuss lab work.

## 2024-06-26 NOTE — TELEPHONE ENCOUNTER
----- Message from Alona Lee sent at 6/26/2024  9:34 AM CDT -----  Contact: Pt [phone]  Consult    She said her glucose is 95 this morning before eating and she still has 2 pills left to take. She wants to know if she should take them or not    Thank you

## 2024-06-26 NOTE — TELEPHONE ENCOUNTER
I spoke with patient she see Ent today she will discuss the prednisone with the physician . Please review her cbc blood work she have some concerns about it she states some things are low

## 2024-06-26 NOTE — TELEPHONE ENCOUNTER
I really do not see a problem in completing the prednisone dose.  However if her symptoms much improved in regard to the ear in which the prednisone was prescribed by the ENT it was okay to stop.  The glucose being elevated during prednisone is only temporary it does not lead to the diabetes unless 1 is on it for a very long time

## 2024-08-02 ENCOUNTER — OFFICE VISIT (OUTPATIENT)
Dept: INTERNAL MEDICINE | Facility: CLINIC | Age: 64
End: 2024-08-02
Payer: MEDICARE

## 2024-08-02 VITALS
SYSTOLIC BLOOD PRESSURE: 120 MMHG | HEIGHT: 66 IN | BODY MASS INDEX: 32.31 KG/M2 | OXYGEN SATURATION: 96 % | HEART RATE: 86 BPM | WEIGHT: 201.06 LBS | DIASTOLIC BLOOD PRESSURE: 80 MMHG

## 2024-08-02 DIAGNOSIS — J06.9 VIRAL URI: Primary | ICD-10-CM

## 2024-08-02 PROCEDURE — 99214 OFFICE O/P EST MOD 30 MIN: CPT | Mod: PBBFAC | Performed by: INTERNAL MEDICINE

## 2024-08-02 PROCEDURE — 99999 PR PBB SHADOW E&M-EST. PATIENT-LVL IV: CPT | Mod: PBBFAC,,, | Performed by: INTERNAL MEDICINE

## 2024-08-02 RX ORDER — AZITHROMYCIN 250 MG/1
TABLET, FILM COATED ORAL
Qty: 6 TABLET | Refills: 0 | Status: SHIPPED | OUTPATIENT
Start: 2024-08-02

## 2024-08-02 NOTE — PROGRESS NOTES
MEDICAL HISTORY:  Hypothyroid disease.  Pre diabetes  Fatty liver with focal nodular hyperplasia.  Anxiety disorder.  Cholecystectomy with pancreatitis.   x3.  Abdominal hernia repair.  Diverticula disease of the colon, for which she had in , a sigmoid colectomy due to diverticulitis, then a stent put in a month later because of microperforation. In 2015, she underwent a balloon dilatation due to stenosis of the colonic anastomosis        Oncology History Overview Note   Diverticulitis   1. 2014: Surgery - Lap segmental resection of sigmoid cancer  2. Complicated by anastomotic leak and subsequent stricture managed conservatively with IR drain and repeat balloon dilations  3. 12/7/15: Robo lap exploration, laparoscopic hand-assisted redo low anterior resection, hand assisted omental advancement flap, cysto and stents     SOCIAL HISTORY:  Tobacco use and alcohol use -- none.       Medications  Xanax 0.5 mg 3 times daily   cytomel 25 mcg half a tablet daily   Levothyroxine 0.088 mg daily   Flonase 2 puffs daily   Claritin once a day  Over-the-counter Pepcid as needed    63-year-old female   Yesterday she was having a sore throat after wakening.  She felt slightly swollen underneath.  That has no fever chills.  No acute congestion she always tends have some degree of clear congestion in her nasal sinuses.  But that has no congestion in his chest or throat no shortness a breath.  Couple times she took Advil Tylenol.  Today she feels much better.  She was getting ready to go out of town for awhile in when it make sure all was fine.    Exam   Weight 201 lb   BMI 32.45   Pulse 82   Blood pressure 128/72  Pulse ox 96%   Tympanic membranes normal   Nasal mucosa is clear  Oropharynx minimally hyperemic   Neck minimally enlarged submandibular lymph glands but this is been noted before  Chest clear breath sounds   Heart regular rate rhythm    Impression   Viral upper respiratory infection    Plan    Observation   She inquired about COVID test.  I offered option of doing 1 in the office but she states she will do 1 at home.    At her request Zithromax Z-Rito was given just to have on hand while she spends time in [place].  She was had sinus infections before

## 2024-09-13 ENCOUNTER — HOSPITAL ENCOUNTER (EMERGENCY)
Facility: HOSPITAL | Age: 64
Discharge: HOME | End: 2024-09-14
Attending: EMERGENCY MEDICINE
Payer: MEDICARE

## 2024-09-13 DIAGNOSIS — R07.89 CHEST WALL PAIN: Primary | ICD-10-CM

## 2024-09-13 DIAGNOSIS — R07.9 CHEST PAIN: ICD-10-CM

## 2024-09-13 LAB
ALBUMIN SERPL BCP-MCNC: 4 G/DL (ref 3.5–5.2)
ALP SERPL-CCNC: 94 U/L (ref 55–135)
ALT SERPL W/O P-5'-P-CCNC: 36 U/L (ref 10–44)
ANION GAP SERPL CALC-SCNC: 9 MMOL/L (ref 8–16)
AST SERPL-CCNC: 25 U/L (ref 10–40)
BASOPHILS # BLD AUTO: 0.03 K/UL (ref 0–0.2)
BASOPHILS NFR BLD: 0.3 % (ref 0–1.9)
BILIRUB SERPL-MCNC: 0.3 MG/DL (ref 0.1–1)
BUN SERPL-MCNC: 21 MG/DL (ref 8–23)
CALCIUM SERPL-MCNC: 9.7 MG/DL (ref 8.7–10.5)
CHLORIDE SERPL-SCNC: 104 MMOL/L (ref 95–110)
CO2 SERPL-SCNC: 26 MMOL/L (ref 23–29)
CREAT SERPL-MCNC: 0.8 MG/DL (ref 0.5–1.4)
D DIMER PPP IA.FEU-MCNC: 0.4 MG/L FEU
DIFFERENTIAL METHOD BLD: NORMAL
EOSINOPHIL # BLD AUTO: 0.1 K/UL (ref 0–0.5)
EOSINOPHIL NFR BLD: 0.9 % (ref 0–8)
ERYTHROCYTE [DISTWIDTH] IN BLOOD BY AUTOMATED COUNT: 12.5 % (ref 11.5–14.5)
EST. GFR  (NO RACE VARIABLE): >60 ML/MIN/1.73 M^2
GLUCOSE SERPL-MCNC: 119 MG/DL (ref 70–110)
HCT VFR BLD AUTO: 39.2 % (ref 37–48.5)
HGB BLD-MCNC: 12.7 G/DL (ref 12–16)
IMM GRANULOCYTES # BLD AUTO: 0.03 K/UL (ref 0–0.04)
IMM GRANULOCYTES NFR BLD AUTO: 0.3 % (ref 0–0.5)
LYMPHOCYTES # BLD AUTO: 2.5 K/UL (ref 1–4.8)
LYMPHOCYTES NFR BLD: 24.5 % (ref 18–48)
MCH RBC QN AUTO: 28.9 PG (ref 27–31)
MCHC RBC AUTO-ENTMCNC: 32.4 G/DL (ref 32–36)
MCV RBC AUTO: 89 FL (ref 82–98)
MONOCYTES # BLD AUTO: 0.7 K/UL (ref 0.3–1)
MONOCYTES NFR BLD: 6.5 % (ref 4–15)
NEUTROPHILS # BLD AUTO: 6.9 K/UL (ref 1.8–7.7)
NEUTROPHILS NFR BLD: 67.5 % (ref 38–73)
NRBC BLD-RTO: 0 /100 WBC
PLATELET # BLD AUTO: 280 K/UL (ref 150–450)
PMV BLD AUTO: 9.5 FL (ref 9.2–12.9)
POTASSIUM SERPL-SCNC: 4 MMOL/L (ref 3.5–5.1)
PROT SERPL-MCNC: 7.6 G/DL (ref 6–8.4)
RBC # BLD AUTO: 4.4 M/UL (ref 4–5.4)
SODIUM SERPL-SCNC: 139 MMOL/L (ref 136–145)
TROPONIN I SERPL DL<=0.01 NG/ML-MCNC: <0.006 NG/ML (ref 0–0.03)
WBC # BLD AUTO: 10.25 K/UL (ref 3.9–12.7)

## 2024-09-13 PROCEDURE — 85379 FIBRIN DEGRADATION QUANT: CPT

## 2024-09-13 PROCEDURE — 96374 THER/PROPH/DIAG INJ IV PUSH: CPT

## 2024-09-13 PROCEDURE — 25000003 PHARM REV CODE 250

## 2024-09-13 PROCEDURE — 85025 COMPLETE CBC W/AUTO DIFF WBC: CPT

## 2024-09-13 PROCEDURE — 93005 ELECTROCARDIOGRAM TRACING: CPT

## 2024-09-13 PROCEDURE — 80053 COMPREHEN METABOLIC PANEL: CPT

## 2024-09-13 PROCEDURE — 87389 HIV-1 AG W/HIV-1&-2 AB AG IA: CPT | Performed by: PHYSICIAN ASSISTANT

## 2024-09-13 PROCEDURE — 93010 ELECTROCARDIOGRAM REPORT: CPT | Mod: ,,, | Performed by: INTERNAL MEDICINE

## 2024-09-13 PROCEDURE — 84484 ASSAY OF TROPONIN QUANT: CPT

## 2024-09-13 PROCEDURE — 99285 EMERGENCY DEPT VISIT HI MDM: CPT | Mod: 25

## 2024-09-13 PROCEDURE — 86803 HEPATITIS C AB TEST: CPT | Performed by: PHYSICIAN ASSISTANT

## 2024-09-13 PROCEDURE — 83880 ASSAY OF NATRIURETIC PEPTIDE: CPT

## 2024-09-13 RX ORDER — FAMOTIDINE 10 MG/ML
20 INJECTION INTRAVENOUS
Status: COMPLETED | OUTPATIENT
Start: 2024-09-13 | End: 2024-09-13

## 2024-09-13 RX ADMIN — FAMOTIDINE 20 MG: 10 INJECTION, SOLUTION INTRAVENOUS at 11:09

## 2024-09-14 VITALS
RESPIRATION RATE: 15 BRPM | DIASTOLIC BLOOD PRESSURE: 75 MMHG | TEMPERATURE: 98 F | OXYGEN SATURATION: 95 % | HEART RATE: 74 BPM | BODY MASS INDEX: 32.31 KG/M2 | WEIGHT: 201.06 LBS | SYSTOLIC BLOOD PRESSURE: 137 MMHG | HEIGHT: 66 IN

## 2024-09-14 LAB
BNP SERPL-MCNC: 14 PG/ML (ref 0–99)
HCV AB SERPL QL IA: NORMAL
HIV 1+2 AB+HIV1 P24 AG SERPL QL IA: NORMAL
OHS QRS DURATION: 84 MS
OHS QTC CALCULATION: 433 MS

## 2024-09-14 RX ORDER — NAPROXEN 500 MG/1
500 TABLET ORAL 2 TIMES DAILY PRN
Qty: 60 TABLET | Refills: 0 | Status: SHIPPED | OUTPATIENT
Start: 2024-09-14

## 2024-09-14 NOTE — ED PROVIDER NOTES
Encounter Date: 2024       History     Chief Complaint   Patient presents with    Chest Pain     Left side worse on inspiration constantly burping with indigestion since earlier today; no cardiac hx     64-year-old female history of hypothyroidism presenting to the emergency department due to nonradiating left-sided chest discomfort intermittent in presentation onset today.  Patient reports initially occurred earlier this afternoon while at [employer].  She was states pain is worse when palpating the left side of the chest.  Patient was states that she does have a granddaughter that she occasionally we will lift up.  Unclear if there is any other recent strenuous activity.  Patient was states with deep inspiration the pain is worsened along with movement.  However it was not short of breath.  Denies recent fever chills nausea vomiting.  No cough.      Review of patient's allergies indicates:  No Known Allergies  Past Medical History:   Diagnosis Date    Allergy     Anxiety     Colon polyps     Deviated septum     Diverticulosis of sigmoid colon     Focal nodular hyperplasia of liver     H/O bone density study     Normal  @ [facility]    Hormone replacement therapy (HRT)     Hypoglycemia     Hypothyroidism     PONV (postoperative nausea and vomiting)     Snores     Thyroid disease      Past Surgical History:   Procedure Laterality Date    ABDOMINAL HERNIA REPAIR       SECTION      CHOLECYSTECTOMY      COLECTOMY  2015    MD Rousseau for stricture    COLON SURGERY  2015    MD Rousseau for Stricture     COLONOSCOPY  2015    Stricture     ENDOMETRIAL ABLATION      HERNIA REPAIR       Family History   Problem Relation Name Age of Onset    Diabetes Mother      Hypertension Mother      Kidney cancer Father      Depression Sister      Breast cancer Paternal Aunt  70    Prostate cancer Other      Leukemia Other          Medicine induced from chemo    Colon cancer Neg Hx      Miscarriages /  Stillbirths Neg Hx       Social History     Tobacco Use    Smoking status: Never    Smokeless tobacco: Never   Substance Use Topics    Alcohol use: Yes     Alcohol/week: 7.0 standard drinks of alcohol     Types: 7 Glasses of wine per week     Comment: social    Drug use: No     Review of Systems  See HPI  Physical Exam     Initial Vitals [09/13/24 2229]   BP Pulse Resp Temp SpO2   (!) 162/76 93 17 98.2 °F (36.8 °C) 99 %      MAP       --         Physical Exam    Vitals reviewed.  Constitutional: She appears well-developed and well-nourished.   HENT:   Head: Normocephalic and atraumatic.   Eyes: Conjunctivae and EOM are normal.   Neck:   Normal range of motion.  Cardiovascular:  Normal rate and normal heart sounds.           Pulmonary/Chest: No respiratory distress. She has no wheezes. She has no rales. She exhibits tenderness.   Abdominal: Abdomen is soft. She exhibits no distension.   Musculoskeletal:         General: Normal range of motion.      Cervical back: Normal range of motion.     Neurological: She is alert and oriented to person, place, and time.   Skin: Skin is warm and dry.   Psychiatric: She has a normal mood and affect. Thought content normal.         ED Course   Procedures  Labs Reviewed   COMPREHENSIVE METABOLIC PANEL - Abnormal       Result Value    Sodium 139      Potassium 4.0      Chloride 104      CO2 26      Glucose 119 (*)     BUN 21      Creatinine 0.8      Calcium 9.7      Total Protein 7.6      Albumin 4.0      Total Bilirubin 0.3      Alkaline Phosphatase 94      AST 25      ALT 36      eGFR >60.0      Anion Gap 9     HIV 1 / 2 ANTIBODY    HIV 1/2 Ag/Ab Non-reactive      Narrative:     Release to patient->Immediate   HEPATITIS C ANTIBODY    Hepatitis C Ab Non-reactive      Narrative:     Release to patient->Immediate   CBC W/ AUTO DIFFERENTIAL    WBC 10.25      RBC 4.40      Hemoglobin 12.7      Hematocrit 39.2      MCV 89      MCH 28.9      MCHC 32.4      RDW 12.5      Platelets 280       "MPV 9.5      Immature Granulocytes 0.3      Gran # (ANC) 6.9      Immature Grans (Abs) 0.03      Lymph # 2.5      Mono # 0.7      Eos # 0.1      Baso # 0.03      nRBC 0      Gran % 67.5      Lymph % 24.5      Mono % 6.5      Eosinophil % 0.9      Basophil % 0.3      Differential Method Automated     TROPONIN I    Troponin I <0.006     B-TYPE NATRIURETIC PEPTIDE    BNP 14     D DIMER, QUANTITATIVE    D-Dimer 0.40            Imaging Results              X-Ray Chest PA And Lateral (Final result)  Result time 09/14/24 01:02:49      Final result by Rick Ibarra MD (09/14/24 01:02:49)                   Impression:      No acute cardiopulmonary finding.      Electronically signed by: Rick Ibarra MD  Date:    09/14/2024  Time:    01:02               Narrative:    EXAMINATION:  XR CHEST PA AND LATERAL    CLINICAL HISTORY:  Provided history is "  Chest pain, unspecified".    TECHNIQUE:  Frontal and lateral views of the chest were performed.    COMPARISON:  11/02/2022.    FINDINGS:  Cardiac silhouette is not enlarged. No focal consolidation.  No sizable pleural effusion.  No pneumothorax.                                       Medications   famotidine (PF) injection 20 mg (20 mg Intravenous Given 9/13/24 6724)     Medical Decision Making  Patient  is a 64 y.o.  female  presenting to the emergency department with complaints of  intermittent left-sided chest discomfort onset today    Differential diagnosis includes but  not limited to pneumonia, GERD, musculoskeletal, PE also considered ACS presentation.  Low cardiac risk score    Significant labwork and diagnostic findings chest x-ray normal, D-dimer is negative no concern of PE.  EKG without ischemic event with normal troponin doubt ACS.  As pain is reproducible this has likely MSK however recommend patient continue to monitor symptoms at home and follow up outpatient        Disposition   Patient was discharged home discussed return precautions      Patient " reassessed, discussed dispo, given opportunity to ask additional questions prior to disposition        Amount and/or Complexity of Data Reviewed  Labs: ordered. Decision-making details documented in ED Course.  Radiology: ordered.  ECG/medicine tests: ordered.     Details: Normal sinus rhythm 87 beats per minute, no ST changes.    Risk  Prescription drug management.      Additional MDM:   Heart Score:    History:          Slightly suspicious.  ECG:             Normal  Age:               >65 years  Risk factors: no risk factors known  Troponin:       Less than or equal to normal limit  Heart Score = 2                ED Course as of 09/14/24 0146   Sat Sep 14, 2024   0018 D-Dimer: 0.40 [CR]      ED Course User Index  [CR] Treasure Jensen PA-C                           Clinical Impression:  Final diagnoses:  [R07.89] Chest wall pain (Primary)          ED Disposition Condition    Discharge           ED Prescriptions       Medication Sig Dispense Start Date End Date Auth. Provider    naproxen (NAPROSYN) 500 MG tablet Take 1 tablet (500 mg total) by mouth 2 (two) times daily as needed (pain). 60 tablet 9/14/2024 -- Treasure Jensen PA-C          Follow-up Information    None          Treasure Jensen PA-C  09/14/24 0146

## 2024-09-14 NOTE — ED TRIAGE NOTES
Abiola Borjas, a 64 y.o. female presents to the ED w/ complaint of chest pain. Patient stated since today she has been experiencing chest pain. Patient denies SOB and N/V/D    Triage note:  Chief Complaint   Patient presents with    Chest Pain     Left side worse on inspiration constantly burping with indigestion since earlier today; no cardiac hx     Review of patient's allergies indicates:  No Known Allergies  Past Medical History:   Diagnosis Date    Allergy     Anxiety     Colon polyps 2014    Deviated septum     Diverticulosis of sigmoid colon     Focal nodular hyperplasia of liver     H/O bone density study 2014    Normal  @ [facility]    Hormone replacement therapy (HRT)     Hypoglycemia     Hypothyroidism     PONV (postoperative nausea and vomiting)     Snores     Thyroid disease

## 2024-09-14 NOTE — DISCHARGE INSTRUCTIONS
As discussed cardiac lab, inflammatory lab are normal.   This is likely muscluar     However if symptoms worsen return

## 2024-09-16 ENCOUNTER — OFFICE VISIT (OUTPATIENT)
Dept: CARDIOLOGY | Facility: CLINIC | Age: 64
End: 2024-09-16
Payer: MEDICARE

## 2024-09-16 VITALS
WEIGHT: 201.88 LBS | SYSTOLIC BLOOD PRESSURE: 112 MMHG | BODY MASS INDEX: 32.59 KG/M2 | OXYGEN SATURATION: 98 % | DIASTOLIC BLOOD PRESSURE: 70 MMHG | HEART RATE: 92 BPM

## 2024-09-16 DIAGNOSIS — R07.2 PRECORDIAL PAIN: ICD-10-CM

## 2024-09-16 PROCEDURE — 99204 OFFICE O/P NEW MOD 45 MIN: CPT | Mod: S$PBB,,, | Performed by: INTERNAL MEDICINE

## 2024-09-16 PROCEDURE — 99999 PR PBB SHADOW E&M-EST. PATIENT-LVL V: CPT | Mod: PBBFAC,,, | Performed by: INTERNAL MEDICINE

## 2024-09-16 PROCEDURE — 99215 OFFICE O/P EST HI 40 MIN: CPT | Mod: PBBFAC | Performed by: INTERNAL MEDICINE

## 2024-09-16 RX ORDER — TURMERIC 400 MG
1 CAPSULE ORAL EVERY 12 HOURS
COMMUNITY

## 2024-09-16 NOTE — PROGRESS NOTES
[facility]    Chief Complaint  Chief Complaint   Patient presents with    Chest Pain       HPI:    Presents for follow-up after emergency department visit with chest pain.  On Friday she was shopping at [employer].  Began to feel a stabbing pain along her left sternal-costal margin.  Was coming and going.  No associated symptoms.  Began to feel some radiation to the left side of her chest and left shoulder.  A done some unusual lifting the day prior.  When she got home she was active without exertional dyspnea or recurrence of chest discomfort.  That evening she went out to dinner.  Began experiencing the discomfort again but it was worse.  Worse when she took a deep breath.  So she went to the emergency department for evaluation.  Workup there was unrevealing.  Since that time she has been taking both Pepcid and Advil.  Symptoms are improving.  Had minor episode yesterday.  None since.  Normally active without exertional dyspnea or chest discomfort.  No prior cardiac problems or workup.    Medications  Current Outpatient Medications   Medication Sig Dispense Refill    acetaminophen (TYLENOL) 500 MG tablet Take 1,000 mg by mouth.      ALPRAZolam (XANAX) 0.5 MG tablet Take 0.5 mg by mouth 3 (three) times daily as needed.       azelastine (ASTELIN) 137 mcg (0.1 %) nasal spray 2 sprays (274 mcg total) by Nasal route 2 (two) times daily. 30 mL 0    cyanocobalamin (VITAMIN B-12) 100 MCG tablet Take 100 mcg by mouth once daily.      CYTOMEL 25 mcg Tab TAKE 1/2 A TABLET BY MOUTH DAILY 45 tablet 5    diazePAM (VALIUM) 2 MG tablet Take 2 mg by mouth 3 (three) times daily.      fluticasone propionate (FLONASE) 50 mcg/actuation nasal spray 1 spray (50 mcg total) by Each Nostril route once daily. 16 g 2    Lactobacillus rhamnosus GG (CULTURELLE) 10 billion cell capsule Take 1 capsule by mouth once daily.      lancets Misc 1 Device by Misc.(Non-Drug; Combo Route) route 2 (two) times daily. 100 each 11    loratadine  (CLARITIN) 10 mg tablet Take 10 mg by mouth once daily.      magnesium 30 mg Tab Take by mouth once. 250 mg twice a day      melatonin 3 mg Tab Take 5 mg by mouth.      multivitamin capsule Take 1 capsule by mouth once daily.      naproxen (NAPROSYN) 500 MG tablet Take 1 tablet (500 mg total) by mouth 2 (two) times daily as needed (pain). 60 tablet 0    omega-3 fatty acids/fish oil (FISH OIL-OMEGA-3 FATTY ACIDS) 300-1,000 mg capsule Take by mouth once daily.      SYNTHROID 88 mcg tablet TAKE 1 TABLET(88 MCG) BY MOUTH BEFORE BREAKFAST 90 tablet 3    TRUE METRIX GLUCOSE TEST STRIP Strp 1 STRIP BY [facility].(NON-DRUG COMBO ROUTE) ROUTE 2 (TWO) TIMES DAILY. 100 strip 10    turmeric 400 mg Cap Take 1 tablet by mouth every 12 (twelve) hours.      vitamin C-multivitamin-mineral 500 mg Chew Take by mouth.      vitamin D (VITAMIN D3) 1000 units Tab Take 1,000 Units by mouth once daily.      zinc gluconate 50 mg tablet Take 50 mg by mouth once daily.      FLUCELVAX QUAD 7867-9213, PF, 60 mcg (15 mcg x 4)/0.5 mL Syrg  (Patient not taking: Reported on 2024)      ondansetron (ZOFRAN-ODT) 4 MG TbDL Take 1 tablet (4 mg total) by mouth every 8 (eight) hours as needed (nausea). (Patient not taking: Reported on 2024) 20 tablet 0     No current facility-administered medications for this visit.        History  Past Medical History:   Diagnosis Date    Allergy     Anxiety     Colon polyps     Deviated septum     Diverticulosis of sigmoid colon     Focal nodular hyperplasia of liver     H/O bone density study     Normal  @ [facility]    Hormone replacement therapy (HRT)     Hypoglycemia     Hypothyroidism     PONV (postoperative nausea and vomiting)     Snores     Thyroid disease      Past Surgical History:   Procedure Laterality Date    ABDOMINAL HERNIA REPAIR       SECTION      CHOLECYSTECTOMY      COLECTOMY  2015    MD Rousseau for stricture    COLON SURGERY  2015    MD Rousseau for Stricture     COLONOSCOPY   09/2015    Stricture     ENDOMETRIAL ABLATION      HERNIA REPAIR       Social History     Socioeconomic History    Marital status:     Number of children: 3   Tobacco Use    Smoking status: Never    Smokeless tobacco: Never   Substance and Sexual Activity    Alcohol use: Not Currently     Alcohol/week: 7.0 standard drinks of alcohol     Types: 7 Glasses of wine per week     Comment: social    Drug use: No    Sexual activity: Not Currently     Partners: Male     Birth control/protection: Post-menopausal, None, See Surgical Hx     Comment: Endometrial Ablation     Family History   Problem Relation Name Age of Onset    Diabetes Mother      Hypertension Mother      Kidney cancer Father      Heart disease Father      Depression Sister      Breast cancer Paternal Aunt  70    Heart disease Paternal Grandfather      Prostate cancer Other      Leukemia Other          Medicine induced from chemo    Colon cancer Neg Hx      Miscarriages / Stillbirths Neg Hx          Allergies  Review of patient's allergies indicates:  No Known Allergies    Review of Systems   Review of Systems   Constitutional: Negative for malaise/fatigue, weight gain and weight loss.   Eyes:  Negative for visual disturbance.   Cardiovascular:  Positive for chest pain. Negative for claudication, cyanosis, dyspnea on exertion, irregular heartbeat, leg swelling, near-syncope, orthopnea, palpitations, paroxysmal nocturnal dyspnea and syncope.   Respiratory:  Negative for cough, hemoptysis, shortness of breath, sleep disturbances due to breathing and wheezing.    Hematologic/Lymphatic: Negative for bleeding problem. Does not bruise/bleed easily.   Skin:  Negative for poor wound healing.   Musculoskeletal:  Negative for muscle cramps and myalgias.   Gastrointestinal:  Negative for abdominal pain, anorexia, diarrhea, heartburn, hematemesis, hematochezia, melena, nausea and vomiting.   Genitourinary:  Negative for hematuria and nocturia.    Neurological:  Negative for excessive daytime sleepiness, dizziness, focal weakness, light-headedness and weakness.       Physical Exam  Vitals:    09/16/24 1114   BP: 112/70   Pulse: 92     Wt Readings from Last 1 Encounters:   09/16/24 91.6 kg (201 lb 14.4 oz)     Physical Exam  Vitals and nursing note reviewed.   Constitutional:       General: She is not in acute distress.     Appearance: She is not toxic-appearing or diaphoretic.   HENT:      Head: Normocephalic and atraumatic.      Mouth/Throat:      Lips: Pink.      Mouth: Mucous membranes are moist.   Eyes:      General: No scleral icterus.     Conjunctiva/sclera: Conjunctivae normal.   Neck:      Thyroid: No thyromegaly.      Vascular: No carotid bruit, hepatojugular reflux or JVD.      Trachea: Trachea normal.   Cardiovascular:      Rate and Rhythm: Normal rate and regular rhythm. No extrasystoles are present.     Chest Wall: PMI is not displaced.      Pulses:           Carotid pulses are 2+ on the right side and 2+ on the left side.       Radial pulses are 2+ on the right side and 2+ on the left side.        Dorsalis pedis pulses are 2+ on the right side and 2+ on the left side.        Posterior tibial pulses are 2+ on the right side and 2+ on the left side.      Heart sounds: S1 normal and S2 normal. No murmur heard.     No friction rub. No S3 or S4 sounds.   Pulmonary:      Effort: Pulmonary effort is normal. No accessory muscle usage or respiratory distress.      Breath sounds: Normal breath sounds and air entry. No decreased breath sounds, wheezing, rhonchi or rales.   Abdominal:      General: Bowel sounds are normal. There is no distension or abdominal bruit.      Palpations: Abdomen is soft. There is no hepatomegaly, splenomegaly or pulsatile mass.      Tenderness: There is no abdominal tenderness.   Musculoskeletal:         General: No tenderness or deformity.      Right lower leg: No edema.      Left lower leg: No edema.   Skin:     General: Skin  is warm and dry.      Capillary Refill: Capillary refill takes less than 2 seconds.      Coloration: Skin is not cyanotic or pale.      Nails: There is no clubbing.   Neurological:      General: No focal deficit present.      Mental Status: She is alert and oriented to person, place, and time.   Psychiatric:         Attention and Perception: Attention normal.         Mood and Affect: Mood normal.         Speech: Speech normal.         Behavior: Behavior normal. Behavior is cooperative.         Labs  Admission on 09/13/2024, Discharged on 09/14/2024   Component Date Value Ref Range Status    HIV 1/2 Ag/Ab 09/13/2024 Non-reactive  Non-reactive Final    Hepatitis C Ab 09/13/2024 Non-reactive  Non-reactive Final    QRS Duration 09/13/2024 84  ms Final    [facility] QTC Calculation 09/13/2024 433  ms Final    WBC 09/13/2024 10.25  3.90 - 12.70 K/uL Final    RBC 09/13/2024 4.40  4.00 - 5.40 M/uL Final    Hemoglobin 09/13/2024 12.7  12.0 - 16.0 g/dL Final    Hematocrit 09/13/2024 39.2  37.0 - 48.5 % Final    MCV 09/13/2024 89  82 - 98 fL Final    MCH 09/13/2024 28.9  27.0 - 31.0 pg Final    MCHC 09/13/2024 32.4  32.0 - 36.0 g/dL Final    RDW 09/13/2024 12.5  11.5 - 14.5 % Final    Platelets 09/13/2024 280  150 - 450 K/uL Final    MPV 09/13/2024 9.5  9.2 - 12.9 fL Final    Immature Granulocytes 09/13/2024 0.3  0.0 - 0.5 % Final    Gran # (ANC) 09/13/2024 6.9  1.8 - 7.7 K/uL Final    Immature Grans (Abs) 09/13/2024 0.03  0.00 - 0.04 K/uL Final    Comment: Mild elevation in immature granulocytes is non specific and   can be seen in a variety of conditions including stress response,   acute inflammation, trauma and pregnancy. Correlation with other   laboratory and clinical findings is essential.      Lymph # 09/13/2024 2.5  1.0 - 4.8 K/uL Final    Mono # 09/13/2024 0.7  0.3 - 1.0 K/uL Final    Eos # 09/13/2024 0.1  0.0 - 0.5 K/uL Final    Baso # 09/13/2024 0.03  0.00 - 0.20 K/uL Final    nRBC 09/13/2024 0  0 /100 WBC Final    Gran %  09/13/2024 67.5  38.0 - 73.0 % Final    Lymph % 09/13/2024 24.5  18.0 - 48.0 % Final    Mono % 09/13/2024 6.5  4.0 - 15.0 % Final    Eosinophil % 09/13/2024 0.9  0.0 - 8.0 % Final    Basophil % 09/13/2024 0.3  0.0 - 1.9 % Final    Differential Method 09/13/2024 Automated   Final    Sodium 09/13/2024 139  136 - 145 mmol/L Final    Potassium 09/13/2024 4.0  3.5 - 5.1 mmol/L Final    Chloride 09/13/2024 104  95 - 110 mmol/L Final    CO2 09/13/2024 26  23 - 29 mmol/L Final    Glucose 09/13/2024 119 (H)  70 - 110 mg/dL Final    BUN 09/13/2024 21  8 - 23 mg/dL Final    Creatinine 09/13/2024 0.8  0.5 - 1.4 mg/dL Final    Calcium 09/13/2024 9.7  8.7 - 10.5 mg/dL Final    Total Protein 09/13/2024 7.6  6.0 - 8.4 g/dL Final    Albumin 09/13/2024 4.0  3.5 - 5.2 g/dL Final    Total Bilirubin 09/13/2024 0.3  0.1 - 1.0 mg/dL Final    Comment: For infants and newborns, interpretation of results should be based  on gestational age, weight and in agreement with clinical  observations.    Premature Infant recommended reference ranges:  Up to 24 hours.............<8.0 mg/dL  Up to 48 hours............<12.0 mg/dL  3-5 days..................<15.0 mg/dL  6-29 days.................<15.0 mg/dL      Alkaline Phosphatase 09/13/2024 94  55 - 135 U/L Final    AST 09/13/2024 25  10 - 40 U/L Final    ALT 09/13/2024 36  10 - 44 U/L Final    eGFR 09/13/2024 >60.0  >60 mL/min/1.73 m^2 Final    Anion Gap 09/13/2024 9  8 - 16 mmol/L Final    Troponin I 09/13/2024 <0.006  0.000 - 0.026 ng/mL Final    Comment: The reference interval for Troponin I represents the 99th percentile   cutoff   for our facility and is consistent with 3rd generation assay   performance.      BNP 09/13/2024 14  0 - 99 pg/mL Final    Values of less than 100 pg/ml are consistent with non-CHF populations.    D-Dimer 09/13/2024 0.40  <0.50 mg/L FEU Final    Comment: The quantitative D-dimer assay should be used as an aid in   the diagnosis of deep vein thrombosis and pulmonary  embolism  in patients with the appropriate presentation and clinical  history. The upper limit of the reference interval and the clinical   cut off   point are identical. Causes of a positive (>0.50 mg/L FEU) D-Dimer   test  include, but are not limited to: DVT, PE, DIC, thrombolytic   therapy, anticoagulant therapy, recent surgery, trauma, or   pregnancy, disseminated malignancy, aortic aneurysm, cirrhosis,  and severe infection. False negative results may occur in   patients with distal DVT.     Lab Visit on 06/21/2024   Component Date Value Ref Range Status    Sodium 06/21/2024 137  136 - 145 mmol/L Final    Potassium 06/21/2024 4.1  3.5 - 5.1 mmol/L Final    Chloride 06/21/2024 104  95 - 110 mmol/L Final    CO2 06/21/2024 25  23 - 29 mmol/L Final    Glucose 06/21/2024 157 (H)  70 - 110 mg/dL Final    BUN 06/21/2024 15  8 - 23 mg/dL Final    Creatinine 06/21/2024 0.7  0.5 - 1.4 mg/dL Final    Calcium 06/21/2024 9.8  8.7 - 10.5 mg/dL Final    Anion Gap 06/21/2024 8  8 - 16 mmol/L Final    eGFR 06/21/2024 >60.0  >60 mL/min/1.73 m^2 Final    BNP 06/21/2024 11  0 - 99 pg/mL Final    Values of less than 100 pg/ml are consistent with non-CHF populations.    WBC 06/21/2024 8.76  3.90 - 12.70 K/uL Final    RBC 06/21/2024 4.61  4.00 - 5.40 M/uL Final    Hemoglobin 06/21/2024 13.4  12.0 - 16.0 g/dL Final    Hematocrit 06/21/2024 41.3  37.0 - 48.5 % Final    MCV 06/21/2024 90  82 - 98 fL Final    MCH 06/21/2024 29.1  27.0 - 31.0 pg Final    MCHC 06/21/2024 32.4  32.0 - 36.0 g/dL Final    RDW 06/21/2024 12.9  11.5 - 14.5 % Final    Platelets 06/21/2024 317  150 - 450 K/uL Final    MPV 06/21/2024 10.3  9.2 - 12.9 fL Final    Immature Granulocytes 06/21/2024 0.3  0.0 - 0.5 % Final    Gran # (ANC) 06/21/2024 7.3  1.8 - 7.7 K/uL Final    Immature Grans (Abs) 06/21/2024 0.03  0.00 - 0.04 K/uL Final    Comment: Mild elevation in immature granulocytes is non specific and   can be seen in a variety of conditions including stress  "response,   acute inflammation, trauma and pregnancy. Correlation with other   laboratory and clinical findings is essential.      Lymph # 06/21/2024 1.3  1.0 - 4.8 K/uL Final    Mono # 06/21/2024 0.1 (L)  0.3 - 1.0 K/uL Final    Eos # 06/21/2024 0.0  0.0 - 0.5 K/uL Final    Baso # 06/21/2024 0.02  0.00 - 0.20 K/uL Final    nRBC 06/21/2024 0  0 /100 WBC Final    Gran % 06/21/2024 83.6 (H)  38.0 - 73.0 % Final    Lymph % 06/21/2024 14.4 (L)  18.0 - 48.0 % Final    Mono % 06/21/2024 1.5 (L)  4.0 - 15.0 % Final    Eosinophil % 06/21/2024 0.0  0.0 - 8.0 % Final    Basophil % 06/21/2024 0.2  0.0 - 1.9 % Final    Differential Method 06/21/2024 Automated   Final    TSH 06/21/2024 1.083  0.400 - 4.000 uIU/mL Final    Vit D, 25-Hydroxy 06/21/2024 85  30 - 96 ng/mL Final    Comment: Vitamin D deficiency.........<10 ng/mL                              Vitamin D insufficiency......10-29 ng/mL       Vitamin D sufficiency........> or equal to 30 ng/mL  Vitamin D toxicity............>100 ng/mL      Hemoglobin A1C 06/21/2024 5.6  4.0 - 5.6 % Final    Comment: ADA Screening Guidelines:  5.7-6.4%  Consistent with prediabetes  >or=6.5%  Consistent with diabetes    High levels of fetal hemoglobin interfere with the HbA1C  assay. Heterozygous hemoglobin variants (HbS, HgC, etc)do  not significantly interfere with this assay.   However, presence of multiple variants may affect accuracy.      Estimated Avg Glucose 06/21/2024 114  68 - 131 mg/dL Final       Imaging  X-Ray Chest PA And Lateral    Result Date: 9/14/2024  EXAMINATION: XR CHEST PA AND LATERAL CLINICAL HISTORY: Provided history is "  Chest pain, unspecified". TECHNIQUE: Frontal and lateral views of the chest were performed. COMPARISON: 11/02/2022. FINDINGS: Cardiac silhouette is not enlarged. No focal consolidation.  No sizable pleural effusion.  No pneumothorax.     No acute cardiopulmonary finding. Electronically signed by: Rick Ibarra MD Date:    09/14/2024 " Time:    01:02      Assessment  1. Precordial pain  Likely musculoskeletal.  - Ambulatory referral/consult to Cardiology  - Echo; Future  - Exercise Stress - EKG; Future  - Lipid Panel; Future      Plan and Discussion    Seems reasonable to continue both Advil and Pepcid.  Will get a stress test to rule out cardiac etiology.  Further planning based on those results.    The 10-year ASCVD risk score (Anny GREENE, et al., 2019) is: 4.9%    Values used to calculate the score:      Age: 64 years      Sex: Female      Is Non- : No      Diabetic: No      Tobacco smoker: No      Systolic Blood Pressure: 112 mmHg      Is BP treated: No      HDL Cholesterol: 46 mg/dL      Total Cholesterol: 181 mg/dL     Follow Up  Follow up for test results.      Esa Tucker MD

## 2024-09-17 ENCOUNTER — LAB VISIT (OUTPATIENT)
Dept: LAB | Facility: HOSPITAL | Age: 64
End: 2024-09-17
Payer: MEDICARE

## 2024-09-17 DIAGNOSIS — R07.2 PRECORDIAL PAIN: ICD-10-CM

## 2024-09-17 LAB
CHOLEST SERPL-MCNC: 157 MG/DL (ref 120–199)
CHOLEST/HDLC SERPL: 3.7 {RATIO} (ref 2–5)
HDLC SERPL-MCNC: 43 MG/DL (ref 40–75)
HDLC SERPL: 27.4 % (ref 20–50)
LDLC SERPL CALC-MCNC: 102.4 MG/DL (ref 63–159)
NONHDLC SERPL-MCNC: 114 MG/DL
TRIGL SERPL-MCNC: 58 MG/DL (ref 30–150)

## 2024-09-17 PROCEDURE — 36415 COLL VENOUS BLD VENIPUNCTURE: CPT | Performed by: INTERNAL MEDICINE

## 2024-09-17 PROCEDURE — 80061 LIPID PANEL: CPT | Performed by: INTERNAL MEDICINE

## 2024-09-20 ENCOUNTER — PATIENT MESSAGE (OUTPATIENT)
Dept: CARDIOLOGY | Facility: CLINIC | Age: 64
End: 2024-09-20

## 2024-09-20 ENCOUNTER — HOSPITAL ENCOUNTER (OUTPATIENT)
Dept: CARDIOLOGY | Facility: OTHER | Age: 64
Discharge: HOME | End: 2024-09-20
Attending: INTERNAL MEDICINE
Payer: MEDICARE

## 2024-09-20 VITALS
DIASTOLIC BLOOD PRESSURE: 71 MMHG | BODY MASS INDEX: 32.3 KG/M2 | HEIGHT: 66 IN | WEIGHT: 201 LBS | DIASTOLIC BLOOD PRESSURE: 70 MMHG | WEIGHT: 201 LBS | HEART RATE: 92 BPM | SYSTOLIC BLOOD PRESSURE: 112 MMHG | HEIGHT: 66 IN | BODY MASS INDEX: 32.3 KG/M2 | HEART RATE: 77 BPM | SYSTOLIC BLOOD PRESSURE: 142 MMHG

## 2024-09-20 DIAGNOSIS — R07.2 PRECORDIAL PAIN: ICD-10-CM

## 2024-09-20 DIAGNOSIS — R07.2 PRECORDIAL PAIN: Primary | ICD-10-CM

## 2024-09-20 LAB
ASCENDING AORTA: 3.07 CM
AV INDEX (PROSTH): 0.89
AV MEAN GRADIENT: 4 MMHG
AV PEAK GRADIENT: 9 MMHG
AV REGURGITATION PRESSURE HALF TIME: 663 MS
AV VALVE AREA BY VELOCITY RATIO: 2.03 CM²
AV VALVE AREA: 2.51 CM²
AV VELOCITY RATIO: 0.72
BSA FOR ECHO PROCEDURE: 2.06 M2
CV ECHO LV RWT: 0.46 CM
CV STRESS BASE HR: 77 BPM
DIASTOLIC BLOOD PRESSURE: 71 MMHG
DOP CALC AO PEAK VEL: 1.48 M/S
DOP CALC AO VTI: 28.3 CM
DOP CALC LVOT AREA: 2.8 CM2
DOP CALC LVOT DIAMETER: 1.89 CM
DOP CALC LVOT PEAK VEL: 1.07 M/S
DOP CALC LVOT STROKE VOLUME: 70.94 CM3
DOP CALC RVOT PEAK VEL: 0.92 M/S
DOP CALC RVOT VTI: 21.4 CM
DOP CALCLVOT PEAK VEL VTI: 25.3 CM
E WAVE DECELERATION TIME: 193.64 MSEC
E/A RATIO: 0.69
E/E' RATIO: 9.43 M/S
ECHO LV POSTERIOR WALL: 1.03 CM (ref 0.6–1.1)
FRACTIONAL SHORTENING: 38 % (ref 28–44)
GLOBAL LONGITUIDAL STRAIN: 17.2 %
INTERVENTRICULAR SEPTUM: 1.01 CM (ref 0.6–1.1)
IVC DIAMETER: 1.21 CM
IVRT: 129.4 MSEC
LA MAJOR: 4.16 CM
LA MINOR: 4.83 CM
LA WIDTH: 3.5 CM
LEFT ATRIUM AREA SYSTOLIC (APICAL 2 CHAMBER): 15.07 CM2
LEFT ATRIUM AREA SYSTOLIC (APICAL 4 CHAMBER): 14.52 CM2
LEFT ATRIUM SIZE: 3.7 CM
LEFT ATRIUM VOLUME INDEX MOD: 20.4 ML/M2
LEFT ATRIUM VOLUME INDEX: 24.6 ML/M2
LEFT ATRIUM VOLUME MOD: 40.86 CM3
LEFT ATRIUM VOLUME: 49.2 CM3
LEFT INTERNAL DIMENSION IN SYSTOLE: 2.78 CM (ref 2.1–4)
LEFT VENTRICLE DIASTOLIC VOLUME INDEX: 46.54 ML/M2
LEFT VENTRICLE DIASTOLIC VOLUME: 93.08 ML
LEFT VENTRICLE END SYSTOLIC VOLUME APICAL 2 CHAMBER: 38.07 ML
LEFT VENTRICLE END SYSTOLIC VOLUME APICAL 4 CHAMBER: 38.71 ML
LEFT VENTRICLE MASS INDEX: 79 G/M2
LEFT VENTRICLE SYSTOLIC VOLUME INDEX: 14.6 ML/M2
LEFT VENTRICLE SYSTOLIC VOLUME: 29.13 ML
LEFT VENTRICULAR INTERNAL DIMENSION IN DIASTOLE: 4.51 CM (ref 3.5–6)
LEFT VENTRICULAR MASS: 158.08 G
LV LATERAL E/E' RATIO: 8.25 M/S
LV SEPTAL E/E' RATIO: 11 M/S
LVED V (TEICH): 93.08 ML
LVES V (TEICH): 29.13 ML
LVOT MG: 2.63 MMHG
LVOT MV: 0.77 CM/S
MV PEAK A VEL: 0.96 M/S
MV PEAK E VEL: 0.66 M/S
MV STENOSIS PRESSURE HALF TIME: 56.16 MS
MV VALVE AREA P 1/2 METHOD: 3.92 CM2
OHS CV CPX 1 MINUTE RECOVERY HEART RATE: 112 BPM
OHS CV CPX 85 PERCENT MAX PREDICTED HEART RATE MALE: 131
OHS CV CPX ESTIMATED METS: 7
OHS CV CPX MAX PREDICTED HEART RATE: 154
OHS CV CPX PATIENT IS FEMALE: 1
OHS CV CPX PATIENT IS MALE: 0
OHS CV CPX PEAK DIASTOLIC BLOOD PRESSURE: 98 MMHG
OHS CV CPX PEAK HEAR RATE: 133 BPM
OHS CV CPX PEAK RATE PRESSURE PRODUCT: NORMAL
OHS CV CPX PEAK SYSTOLIC BLOOD PRESSURE: 197 MMHG
OHS CV CPX PERCENT MAX PREDICTED HEART RATE ACHIEVED: 90
OHS CV CPX RATE PRESSURE PRODUCT PRESENTING: NORMAL
PISA AR MAX VEL: 2.54 M/S
PISA MRMAX VEL: 5.21 M/S
PISA TR MAX VEL: 2.6 M/S
PULM VEIN S/D RATIO: 1.67
PV MEAN GRADIENT: 2 MMHG
PV PEAK D VEL: 0.39 M/S
PV PEAK GRADIENT: 4 MMHG
PV PEAK S VEL: 0.65 M/S
PV PEAK VELOCITY: 1.01 M/S
RA MAJOR: 3.95 CM
RA PRESSURE ESTIMATED: 3 MMHG
RA WIDTH: 3.8 CM
RIGHT VENTRICLE DIASTOLIC BASEL DIMENSION: 2.9 CM
RV TB RVSP: 6 MMHG
RV TISSUE DOPPLER FREE WALL SYSTOLIC VELOCITY 1 (APICAL 4 CHAMBER VIEW): 11.82 CM/S
SINUS: 2.9 CM
STJ: 2.83 CM
STRESS ECHO POST EXERCISE DUR MIN: 6 MINUTES
STRESS ECHO POST EXERCISE DUR SEC: 1 SECONDS
SYSTOLIC BLOOD PRESSURE: 142 MMHG
TDI LATERAL: 0.08 M/S
TDI SEPTAL: 0.06 M/S
TDI: 0.07 M/S
TR MAX PG: 27 MMHG
TRICUSPID ANNULAR PLANE SYSTOLIC EXCURSION: 2.2 CM
TV REST PULMONARY ARTERY PRESSURE: 30 MMHG
Z-SCORE OF LEFT VENTRICULAR DIMENSION IN END DIASTOLE: -2.57
Z-SCORE OF LEFT VENTRICULAR DIMENSION IN END SYSTOLE: -2

## 2024-09-20 PROCEDURE — 93356 MYOCRD STRAIN IMG SPCKL TRCK: CPT

## 2024-09-20 PROCEDURE — 93306 TTE W/DOPPLER COMPLETE: CPT | Mod: 26,,, | Performed by: INTERNAL MEDICINE

## 2024-09-20 PROCEDURE — 93016 CV STRESS TEST SUPVJ ONLY: CPT | Mod: ,,, | Performed by: INTERNAL MEDICINE

## 2024-09-20 PROCEDURE — 93356 MYOCRD STRAIN IMG SPCKL TRCK: CPT | Mod: ,,, | Performed by: INTERNAL MEDICINE

## 2024-09-20 PROCEDURE — 93017 CV STRESS TEST TRACING ONLY: CPT

## 2024-09-20 PROCEDURE — 93018 CV STRESS TEST I&R ONLY: CPT | Mod: ,,, | Performed by: INTERNAL MEDICINE

## 2024-09-20 PROCEDURE — 93306 TTE W/DOPPLER COMPLETE: CPT

## 2024-10-31 ENCOUNTER — TELEPHONE (OUTPATIENT)
Dept: INTERNAL MEDICINE | Facility: CLINIC | Age: 64
End: 2024-10-31

## 2024-10-31 ENCOUNTER — OFFICE VISIT (OUTPATIENT)
Dept: INTERNAL MEDICINE | Facility: CLINIC | Age: 64
End: 2024-10-31
Payer: MEDICARE

## 2024-10-31 VITALS
OXYGEN SATURATION: 96 % | WEIGHT: 199.06 LBS | BODY MASS INDEX: 31.99 KG/M2 | DIASTOLIC BLOOD PRESSURE: 78 MMHG | HEART RATE: 85 BPM | HEIGHT: 66 IN | SYSTOLIC BLOOD PRESSURE: 134 MMHG

## 2024-10-31 DIAGNOSIS — M79.605 LUMBAR PAIN WITH RADIATION DOWN BOTH LEGS: ICD-10-CM

## 2024-10-31 DIAGNOSIS — M79.604 LUMBAR PAIN WITH RADIATION DOWN BOTH LEGS: ICD-10-CM

## 2024-10-31 DIAGNOSIS — M54.50 LUMBAR PAIN WITH RADIATION DOWN BOTH LEGS: ICD-10-CM

## 2024-10-31 DIAGNOSIS — H81.02 MENIERE'S DISEASE OF LEFT EAR: Primary | ICD-10-CM

## 2024-10-31 DIAGNOSIS — H93.19 TINNITUS, UNSPECIFIED LATERALITY: ICD-10-CM

## 2024-10-31 LAB
BILIRUB UR QL STRIP: NEGATIVE
CLARITY UR REFRACT.AUTO: CLEAR
COLOR UR AUTO: YELLOW
GLUCOSE UR QL STRIP: NEGATIVE
HGB UR QL STRIP: NEGATIVE
KETONES UR QL STRIP: NEGATIVE
LEUKOCYTE ESTERASE UR QL STRIP: NEGATIVE
NITRITE UR QL STRIP: NEGATIVE
PH UR STRIP: 7 [PH] (ref 5–8)
PROT UR QL STRIP: NEGATIVE
SP GR UR STRIP: 1 (ref 1–1.03)
URN SPEC COLLECT METH UR: NORMAL

## 2024-10-31 PROCEDURE — 99999 PR PBB SHADOW E&M-EST. PATIENT-LVL IV: CPT | Mod: PBBFAC,,, | Performed by: INTERNAL MEDICINE

## 2024-10-31 PROCEDURE — 99214 OFFICE O/P EST MOD 30 MIN: CPT | Mod: PBBFAC | Performed by: INTERNAL MEDICINE

## 2024-10-31 PROCEDURE — 99214 OFFICE O/P EST MOD 30 MIN: CPT | Mod: S$PBB,,, | Performed by: INTERNAL MEDICINE

## 2024-10-31 PROCEDURE — 81003 URINALYSIS AUTO W/O SCOPE: CPT | Performed by: INTERNAL MEDICINE

## 2024-10-31 RX ORDER — PROMETHAZINE HYDROCHLORIDE 25 MG/1
25 TABLET ORAL EVERY 12 HOURS PRN
COMMUNITY
Start: 2024-10-30

## 2024-10-31 RX ORDER — TRIAMTERENE AND HYDROCHLOROTHIAZIDE 37.5; 25 MG/1; MG/1
1 CAPSULE ORAL DAILY
COMMUNITY
Start: 2024-10-30

## 2024-10-31 RX ORDER — IBUPROFEN 200 MG
200 TABLET ORAL EVERY 8 HOURS PRN
COMMUNITY

## 2024-10-31 RX ORDER — PREDNISONE 10 MG/1
10 TABLET ORAL DAILY
COMMUNITY
Start: 2024-10-30

## 2024-11-01 ENCOUNTER — HOSPITAL ENCOUNTER (EMERGENCY)
Facility: HOSPITAL | Age: 64
Discharge: HOME | End: 2024-11-01
Attending: STUDENT IN AN ORGANIZED HEALTH CARE EDUCATION/TRAINING PROGRAM
Payer: MEDICARE

## 2024-11-01 VITALS
HEIGHT: 66 IN | DIASTOLIC BLOOD PRESSURE: 77 MMHG | WEIGHT: 195 LBS | OXYGEN SATURATION: 97 % | SYSTOLIC BLOOD PRESSURE: 147 MMHG | TEMPERATURE: 99 F | RESPIRATION RATE: 16 BRPM | BODY MASS INDEX: 31.34 KG/M2 | HEART RATE: 72 BPM

## 2024-11-01 DIAGNOSIS — E86.0 DEHYDRATION: ICD-10-CM

## 2024-11-01 DIAGNOSIS — M62.838 MUSCLE SPASM: Primary | ICD-10-CM

## 2024-11-01 LAB
ALBUMIN SERPL BCP-MCNC: 4.3 G/DL (ref 3.5–5.2)
ALP SERPL-CCNC: 103 U/L (ref 40–150)
ALT SERPL W/O P-5'-P-CCNC: 39 U/L (ref 10–44)
ANION GAP SERPL CALC-SCNC: 11 MMOL/L (ref 8–16)
AST SERPL-CCNC: 28 U/L (ref 10–40)
BASOPHILS # BLD AUTO: 0.04 K/UL (ref 0–0.2)
BASOPHILS NFR BLD: 0.4 % (ref 0–1.9)
BILIRUB SERPL-MCNC: 0.6 MG/DL (ref 0.1–1)
BILIRUB UR QL STRIP: NEGATIVE
BUN SERPL-MCNC: 13 MG/DL (ref 8–23)
CALCIUM SERPL-MCNC: 10.1 MG/DL (ref 8.7–10.5)
CHLORIDE SERPL-SCNC: 98 MMOL/L (ref 95–110)
CK SERPL-CCNC: 413 U/L (ref 20–180)
CLARITY UR REFRACT.AUTO: CLEAR
CO2 SERPL-SCNC: 26 MMOL/L (ref 23–29)
COLOR UR AUTO: YELLOW
CREAT SERPL-MCNC: 0.7 MG/DL (ref 0.5–1.4)
DIFFERENTIAL METHOD BLD: NORMAL
EOSINOPHIL # BLD AUTO: 0.1 K/UL (ref 0–0.5)
EOSINOPHIL NFR BLD: 0.8 % (ref 0–8)
ERYTHROCYTE [DISTWIDTH] IN BLOOD BY AUTOMATED COUNT: 12.8 % (ref 11.5–14.5)
EST. GFR  (NO RACE VARIABLE): >60 ML/MIN/1.73 M^2
GLUCOSE SERPL-MCNC: 150 MG/DL (ref 70–110)
GLUCOSE UR QL STRIP: NEGATIVE
HCT VFR BLD AUTO: 42.1 % (ref 37–48.5)
HGB BLD-MCNC: 13.8 G/DL (ref 12–16)
HGB UR QL STRIP: NEGATIVE
IMM GRANULOCYTES # BLD AUTO: 0.02 K/UL (ref 0–0.04)
IMM GRANULOCYTES NFR BLD AUTO: 0.2 % (ref 0–0.5)
INFLUENZA A, MOLECULAR: NEGATIVE
INFLUENZA B, MOLECULAR: NEGATIVE
KETONES UR QL STRIP: NEGATIVE
LEUKOCYTE ESTERASE UR QL STRIP: NEGATIVE
LYMPHOCYTES # BLD AUTO: 2.8 K/UL (ref 1–4.8)
LYMPHOCYTES NFR BLD: 27.6 % (ref 18–48)
MAGNESIUM SERPL-MCNC: 2.1 MG/DL (ref 1.6–2.6)
MCH RBC QN AUTO: 28.3 PG (ref 27–31)
MCHC RBC AUTO-ENTMCNC: 32.8 G/DL (ref 32–36)
MCV RBC AUTO: 86 FL (ref 82–98)
MONOCYTES # BLD AUTO: 0.6 K/UL (ref 0.3–1)
MONOCYTES NFR BLD: 6 % (ref 4–15)
NEUTROPHILS # BLD AUTO: 6.7 K/UL (ref 1.8–7.7)
NEUTROPHILS NFR BLD: 65 % (ref 38–73)
NITRITE UR QL STRIP: NEGATIVE
NRBC BLD-RTO: 0 /100 WBC
PH UR STRIP: 7 [PH] (ref 5–8)
PLATELET # BLD AUTO: 318 K/UL (ref 150–450)
PMV BLD AUTO: 9.5 FL (ref 9.2–12.9)
POTASSIUM SERPL-SCNC: 3.8 MMOL/L (ref 3.5–5.1)
PROT SERPL-MCNC: 8.2 G/DL (ref 6–8.4)
PROT UR QL STRIP: NEGATIVE
RBC # BLD AUTO: 4.87 M/UL (ref 4–5.4)
SARS-COV-2 RDRP RESP QL NAA+PROBE: NEGATIVE
SODIUM SERPL-SCNC: 135 MMOL/L (ref 136–145)
SP GR UR STRIP: 1.01 (ref 1–1.03)
SPECIMEN SOURCE: NORMAL
URN SPEC COLLECT METH UR: NORMAL
WBC # BLD AUTO: 10.25 K/UL (ref 3.9–12.7)

## 2024-11-01 PROCEDURE — 99284 EMERGENCY DEPT VISIT MOD MDM: CPT | Mod: 25

## 2024-11-01 PROCEDURE — 81003 URINALYSIS AUTO W/O SCOPE: CPT

## 2024-11-01 PROCEDURE — 82550 ASSAY OF CK (CPK): CPT

## 2024-11-01 PROCEDURE — 87502 INFLUENZA DNA AMP PROBE: CPT

## 2024-11-01 PROCEDURE — 25000003 PHARM REV CODE 250

## 2024-11-01 PROCEDURE — 83735 ASSAY OF MAGNESIUM: CPT

## 2024-11-01 PROCEDURE — 85025 COMPLETE CBC W/AUTO DIFF WBC: CPT

## 2024-11-01 PROCEDURE — 96360 HYDRATION IV INFUSION INIT: CPT

## 2024-11-01 PROCEDURE — 80053 COMPREHEN METABOLIC PANEL: CPT

## 2024-11-01 PROCEDURE — 87635 SARS-COV-2 COVID-19 AMP PRB: CPT

## 2024-11-01 RX ADMIN — SODIUM CHLORIDE 1000 ML: 9 INJECTION, SOLUTION INTRAVENOUS at 07:11

## 2024-11-01 NOTE — ED PROVIDER NOTES
Encounter Date: 2024       History     Chief Complaint   Patient presents with    Spasms     Pt c/o muscle spasms since last night, pt reports she started taking prednisone and an antidiuretic yesterday      64-year-old female history of hypothyroidism presenting to the emergency department with spasms.  Patient states that she has history of Meniere's disease that recently had an exacerbation.  She saw her ENT in clinic who started her on new medications including prednisone.  She states that last night she began to have severe muscle cramping worse when she was lying flat.  Muscle cramping would improve after ambulation.  However every time she laid down the muscle cramping would come back.  This prevented her from sleeping.  She tried to drink many different types of fluids or eat many different types of electrolytes but symptoms persisted.  Denying fevers or chills, denies cough, denies chest pain, denying or dizziness. Currently asymptomatic     The history is provided by the patient and medical records.     Review of patient's allergies indicates:  No Known Allergies  Past Medical History:   Diagnosis Date    Allergy     Anxiety     Colon polyps     Deviated septum     Diverticulosis of sigmoid colon     Focal nodular hyperplasia of liver     H/O bone density study     Normal  @ [facility]    Hormone replacement therapy (HRT)     Hypoglycemia     Hypothyroidism     PONV (postoperative nausea and vomiting)     Snores     Thyroid disease      Past Surgical History:   Procedure Laterality Date    ABDOMINAL HERNIA REPAIR       SECTION      CHOLECYSTECTOMY      COLECTOMY  2015    MD Rousseau for stricture    COLON SURGERY  2015    MD Rousseau for Stricture     COLONOSCOPY  2015    Stricture     ENDOMETRIAL ABLATION      HERNIA REPAIR       Family History   Problem Relation Name Age of Onset    Diabetes Mother      Hypertension Mother      Kidney cancer Father      Heart disease Father       Depression Sister      Breast cancer Paternal Aunt  70    Heart disease Paternal Grandfather      Prostate cancer Other      Leukemia Other          Medicine induced from chemo    Colon cancer Neg Hx      Miscarriages / Stillbirths Neg Hx       Social History     Tobacco Use    Smoking status: Never    Smokeless tobacco: Never   Substance Use Topics    Alcohol use: Not Currently     Alcohol/week: 7.0 standard drinks of alcohol     Types: 7 Glasses of wine per week     Comment: social    Drug use: No     Review of Systems  ROS negative except as noted in HPI     Physical Exam     Initial Vitals [11/01/24 0526]   BP Pulse Resp Temp SpO2   133/75 95 18 97.9 °F (36.6 °C) 97 %      MAP       --         Physical Exam    Nursing note and vitals reviewed.  Constitutional: She is not diaphoretic. No distress.   HENT:   Head: Normocephalic.   Right Ear: External ear normal.   Left Ear: External ear normal.   Nose: Nose normal. Mouth/Throat: Oropharynx is clear and moist.   Eyes: Conjunctivae are normal. No scleral icterus.   Cardiovascular:  Normal rate.           Pulmonary/Chest: No respiratory distress.   Musculoskeletal:         General: No tenderness or edema. Normal range of motion.     Neurological: She is alert and oriented to person, place, and time. She has normal strength.   Normal gait   Skin: Skin is warm. Capillary refill takes less than 2 seconds.   Psychiatric: She has a normal mood and affect.         ED Course   Procedures  Labs Reviewed   COMPREHENSIVE METABOLIC PANEL - Abnormal       Result Value    Sodium 135 (*)     Potassium 3.8      Chloride 98      CO2 26      Glucose 150 (*)     BUN 13      Creatinine 0.7      Calcium 10.1      Total Protein 8.2      Albumin 4.3      Total Bilirubin 0.6      Alkaline Phosphatase 103      AST 28      ALT 39      eGFR >60.0      Anion Gap 11     CK - Abnormal     (*)    INFLUENZA A & B BY MOLECULAR    Influenza A, Molecular Negative      Influenza  B, Molecular Negative      Flu A & B Source Nasal swab     CBC W/ AUTO DIFFERENTIAL    WBC 10.25      RBC 4.87      Hemoglobin 13.8      Hematocrit 42.1      MCV 86      MCH 28.3      MCHC 32.8      RDW 12.8      Platelets 318      MPV 9.5      Immature Granulocytes 0.2      Gran # (ANC) 6.7      Immature Grans (Abs) 0.02      Lymph # 2.8      Mono # 0.6      Eos # 0.1      Baso # 0.04      nRBC 0      Gran % 65.0      Lymph % 27.6      Mono % 6.0      Eosinophil % 0.8      Basophil % 0.4      Differential Method Automated     MAGNESIUM    Magnesium 2.1     URINALYSIS, REFLEX TO URINE CULTURE    Specimen UA Urine, Clean Catch      Color, UA Yellow      Appearance, UA Clear      pH, UA 7.0      Specific Gravity, UA 1.010      Protein, UA Negative      Glucose, UA Negative      Ketones, UA Negative      Bilirubin (UA) Negative      Occult Blood UA Negative      Nitrite, UA Negative      Leukocytes, UA Negative      Narrative:     Specimen Source->Urine   SARS-COV-2 RNA AMPLIFICATION, QUAL    SARS-CoV-2 RNA, Amplification, Qual Negative            Imaging Results    None          Medications   sodium chloride 0.9% bolus 1,000 mL 1,000 mL (1,000 mLs Intravenous New Bag 11/1/24 0754)     Medical Decision Making  64-year-old female history of hypothyroidism presenting to the emergency department with spasms.     On initial evaluation patient was alert and oriented speaking full sentences, cooperative with history and physical examination.  Vitals are within normal limits.    Differential for patient's presentation includes hyponatremia, hypokalemia, QUINN, rhabdo, influenza or COVID    See ED course below.  Patient's workup relatively unremarkable for acute reversible pathology.  Had mild elevation of CPK consistent with patient's myalgias.    We will provide patient with IV fluids and likely discharge.  At time of sign-out patient was pending IV fluid administration.  Final disposition pending re-evaluation by [facility]  team.    Amount and/or Complexity of Data Reviewed  Labs: ordered. Decision-making details documented in ED Course.               ED Course as of 11/01/24 0801 Fri Nov 01, 2024 0712 SARS-CoV-2 RNA, Amplification, Qual: Negative [TK]   0712 Influenza A, Molecular: Negative [TK]   0712 Influenza B, Molecular: Negative [TK]   0722 CPK(!): 413 [TK]   0722 Sodium(!): 135 [TK]   0722 Potassium: 3.8 [TK]   0722 BUN: 13 [TK]   0722 Creatinine: 0.7 [TK]   0722 Calcium: 10.1 [TK]      ED Course User Index  [TK] Kurtis Moran DO                           Clinical Impression:  Final diagnoses:  [M62.838] Muscle spasm (Primary)  [E86.0] Dehydration                 Kurtis Moran DO  Resident  11/01/24 0801

## 2024-11-01 NOTE — PROVIDER PROGRESS NOTES - EMERGENCY DEPT.
Encounter Date: 11/1/2024    ED Physician Progress Notes        7:26 AM  I received sign-out on this patient Dr. Moran and will assume her care at this time.  Briefly, she is 64-year-old female history of hypothyroidism presenting to the emergency department with spasms. Patient states that she has history of Meniere's disease that recently had an exacerbation. She saw her ENT in clinic who started her on new medications including prednisone. She states that last night she began to have severe muscle cramping worse when she was lying flat.     Differential for patient's presentation includes hyponatremia, hypokalemia, QUINN, rhabdo, influenza or COVID.  COVID/flu negative.  Potassium within normal limits at 3.8.  .  This is a mild elevation and patient is currently asymptomatic with normal renal function.  Patient given 1 L bolus of fluid and remains without any symptoms at this time.    Patient given discharge instructions and safe for discharge to home.

## 2024-11-01 NOTE — DISCHARGE INSTRUCTIONS
Provided you with fluids for your current condition.  Please return to the emergency department if your condition worsens however we do not expect this to be the case today please review all your medications with your primary care doctor as well as your ENT.

## 2024-11-01 NOTE — ED NOTES
Patient identifiers for [facility] 64 y.o. female checked and correct.  Chief Complaint   Patient presents with    Spasms     Pt c/o muscle spasms since last night, pt reports she started taking prednisone and an antidiuretic yesterday    Pt states tried drinking pedialyte, eating banana and avocado  Past Medical History:   Diagnosis Date    Allergy     Anxiety     Colon polyps 2014    Deviated septum     Diverticulosis of sigmoid colon     Focal nodular hyperplasia of liver     H/O bone density study 2014    Normal  @ [facility]    Hormone replacement therapy (HRT)     Hypoglycemia     Hypothyroidism     PONV (postoperative nausea and vomiting)     Snores     Thyroid disease      Allergies reported: Review of patient's allergies indicates:  No Known Allergies      HEENT: Denies vision changes. Denies ear drainage or hearing loss. No c/o nasal drainage. Denies dysphagia or voice changes.   Appearance: Pt awake, alert & oriented to person, place & time. Pt in no acute distress at present time. Pt is clean and well groomed with clothes appropriately fastened.   Skin: Skin warm, dry & intact. Color consistent with ethnicity. Mucous membranes moist. No breakdown or brusing noted.   Musculoskeletal: Patient moving all extremities well, no obvious swelling or deformities noted. +muscle spasms  Respiratory: Respirations spontaneous, even, and non-labored. Visible chest rise noted. Airway is open and patent. No accessory muscle use noted.   Neurologic: Sensation is intact. Speech is clear and appropriate. Eyes open spontaneously, behavior appropriate to situation, follows commands, facial expression symmetrical, bilateral hand grasp equal and even, purposeful motor response noted.  Cardiac: All peripheral pulses present. No Bilateral lower extremity edema. Cap refill is <3 seconds.  Abdomen: Abdomen soft, non distended, non tender to palpation.   : Pt voids independently, denies dysuria, hematuria, frequency.

## 2024-11-04 ENCOUNTER — TELEPHONE (OUTPATIENT)
Dept: INTERNAL MEDICINE | Facility: CLINIC | Age: 64
End: 2024-11-04
Payer: MEDICARE

## 2024-11-04 NOTE — TELEPHONE ENCOUNTER
----- Message from Shania sent at 11/4/2024  3:11 PM CST -----  Contact: Self/ [phone]  Caller is requesting an earlier appointment then we can schedule.  Caller is requesting a message be sent to the provider.    If this is for urgent care symptoms, did you offer other providers at this location, providers at other locations, or [facility]? (yes, no, n/a):  n/a    If this is for the patients physical, did you offer to schedule next available and put on wait list, or to see NP or PA for their physical?  (yes, no, n/a):n/a      When is the next available appointment with their provider:  11/13    Reason for the appointment:  ER follow up     Patient preference of timeframe to be scheduled:  11/6    Would the patient like a call back, or a response through their [facility] portal?:   call back     Comments:

## 2024-11-05 NOTE — PROGRESS NOTES
MEDICAL HISTORY:  Hypothyroid disease.  Pre diabetes  Fatty liver with focal nodular hyperplasia.  Anxiety disorder.  Cholecystectomy with pancreatitis.   x3.  Abdominal hernia repair.  Diverticula disease of the colon, for which she had in , a sigmoid colectomy due to diverticulitis, then a stent put in a month later because of microperforation. In 2015, she underwent a balloon dilatation due to stenosis of the colonic anastomosis        Oncology History Overview Note   Diverticulitis   1. 2014: Surgery - Lap segmental resection of sigmoid cancer  2. Complicated by anastomotic leak and subsequent stricture managed conservatively with IR drain and repeat balloon dilations  3. 12/7/15: Robo lap exploration, laparoscopic hand-assisted redo low anterior resection, hand assisted omental advancement flap, cysto and stents     SOCIAL HISTORY:  Tobacco use and alcohol use -- none.       Medications  Xanax 0.5 mg 3 times daily   cytomel 25 mcg half a tablet daily   Levothyroxine 0.088 mg daily   Flonase 2 puffs daily   Claritin once a day  Over-the-counter Pepcid as needed      64-year-old female     Emergency room follow-up   She was seen by me on .  Follow-up after she had attack of Meniere's disease and was seen by ENT.  Prednisone taper was prescribed as well as Dyazide.  She was started it the morning of .    It was some point in the evening  she was started feeling a cramp in his abdomen that lasted for few minutes but then there was cramps in his spasm involving the legs and hands that has reoccurring.  She was attempted multiple remedies as for his coconut water, water Pedialyte, tonic water without success    When she presented emergency room notice CPK was elevated at 435.  She was given IV fluids    Since that time she was stopped the prednisone in his Dyazide.  Actually for the Meniere's disease she was going to start betahistine today.  She will also  have a follow-up with the ENT with the possibility of ear tube in his left ear    Presently she feels better.  Occasionally she felt a electric shock discomfort involving the legs that was various sporadic.  She was not having any cramps.  She does express a concern regarding the sodium because she was followed in his low-sodium diet now.    Also during the lab testing in his glucose was elevated at 150    Examination   Weight 196   BMI 31.78   Pulse 72   Blood pressure by me 120/72   Chest clear breath sounds   Heart regular rate and rhythm   Abdominal exam active bowel sounds soft nontender no hepatosplenomegaly abdominal masses   1+ knee and ankle jerk reflexes  2+ pedal pulses   No pain palpation over the calf muscles in his muscles not firm    Impression   Muscle spasms/cramps   Elevated CPK   Hyperglycemia   Hypothyroid    Plan   Today will get repeat basic metabolic profile, CPK thyroid panel hemoglobin A1c zinc and magnesium urine sodium  Stretching exercises legs discussed and Phone review to follow-up

## 2024-11-06 ENCOUNTER — PATIENT MESSAGE (OUTPATIENT)
Dept: INTERNAL MEDICINE | Facility: CLINIC | Age: 64
End: 2024-11-06

## 2024-11-06 ENCOUNTER — LAB VISIT (OUTPATIENT)
Dept: LAB | Facility: HOSPITAL | Age: 64
End: 2024-11-06
Attending: INTERNAL MEDICINE
Payer: MEDICARE

## 2024-11-06 ENCOUNTER — OFFICE VISIT (OUTPATIENT)
Dept: INTERNAL MEDICINE | Facility: CLINIC | Age: 64
End: 2024-11-06
Payer: MEDICARE

## 2024-11-06 VITALS
DIASTOLIC BLOOD PRESSURE: 76 MMHG | SYSTOLIC BLOOD PRESSURE: 128 MMHG | OXYGEN SATURATION: 98 % | WEIGHT: 196.88 LBS | HEART RATE: 70 BPM | RESPIRATION RATE: 18 BRPM | HEIGHT: 66 IN | BODY MASS INDEX: 31.64 KG/M2

## 2024-11-06 DIAGNOSIS — E03.9 HYPOTHYROIDISM, UNSPECIFIED TYPE: ICD-10-CM

## 2024-11-06 DIAGNOSIS — R74.8 ELEVATED CPK: ICD-10-CM

## 2024-11-06 DIAGNOSIS — R25.2 MUSCLE CRAMPS: ICD-10-CM

## 2024-11-06 DIAGNOSIS — H81.09 MENIERE'S DISEASE, UNSPECIFIED LATERALITY: ICD-10-CM

## 2024-11-06 DIAGNOSIS — R73.9 HYPERGLYCEMIA: ICD-10-CM

## 2024-11-06 DIAGNOSIS — R25.2 MUSCLE CRAMPS: Primary | ICD-10-CM

## 2024-11-06 LAB
ANION GAP SERPL CALC-SCNC: 11 MMOL/L (ref 8–16)
BUN SERPL-MCNC: 14 MG/DL (ref 8–23)
CALCIUM SERPL-MCNC: 10.3 MG/DL (ref 8.7–10.5)
CHLORIDE SERPL-SCNC: 101 MMOL/L (ref 95–110)
CK SERPL-CCNC: 280 U/L (ref 20–180)
CO2 SERPL-SCNC: 27 MMOL/L (ref 23–29)
CREAT SERPL-MCNC: 0.7 MG/DL (ref 0.5–1.4)
EST. GFR  (NO RACE VARIABLE): >60 ML/MIN/1.73 M^2
ESTIMATED AVG GLUCOSE: 114 MG/DL (ref 68–131)
GLUCOSE SERPL-MCNC: 104 MG/DL (ref 70–110)
HBA1C MFR BLD: 5.6 % (ref 4–5.6)
MAGNESIUM SERPL-MCNC: 2.1 MG/DL (ref 1.6–2.6)
POTASSIUM SERPL-SCNC: 4.3 MMOL/L (ref 3.5–5.1)
SODIUM SERPL-SCNC: 139 MMOL/L (ref 136–145)
SODIUM UR-SCNC: 10 MMOL/L (ref 20–250)
T3FREE SERPL-MCNC: 4.3 PG/ML (ref 2.3–4.2)
T4 FREE SERPL-MCNC: 1.12 NG/DL (ref 0.71–1.51)
TSH SERPL DL<=0.005 MIU/L-ACNC: 1.91 UIU/ML (ref 0.4–4)

## 2024-11-06 PROCEDURE — 84300 ASSAY OF URINE SODIUM: CPT | Performed by: INTERNAL MEDICINE

## 2024-11-06 PROCEDURE — 99999 PR PBB SHADOW E&M-EST. PATIENT-LVL IV: CPT | Mod: PBBFAC,,, | Performed by: INTERNAL MEDICINE

## 2024-11-06 PROCEDURE — 84481 FREE ASSAY (FT-3): CPT | Performed by: INTERNAL MEDICINE

## 2024-11-06 PROCEDURE — 83735 ASSAY OF MAGNESIUM: CPT | Performed by: INTERNAL MEDICINE

## 2024-11-06 PROCEDURE — 84439 ASSAY OF FREE THYROXINE: CPT | Performed by: INTERNAL MEDICINE

## 2024-11-06 PROCEDURE — 84630 ASSAY OF ZINC: CPT | Performed by: INTERNAL MEDICINE

## 2024-11-06 PROCEDURE — 83036 HEMOGLOBIN GLYCOSYLATED A1C: CPT | Performed by: INTERNAL MEDICINE

## 2024-11-06 PROCEDURE — 80048 BASIC METABOLIC PNL TOTAL CA: CPT | Performed by: INTERNAL MEDICINE

## 2024-11-06 PROCEDURE — 99214 OFFICE O/P EST MOD 30 MIN: CPT | Mod: S$PBB,,, | Performed by: INTERNAL MEDICINE

## 2024-11-06 PROCEDURE — 99214 OFFICE O/P EST MOD 30 MIN: CPT | Mod: PBBFAC | Performed by: INTERNAL MEDICINE

## 2024-11-06 PROCEDURE — 84443 ASSAY THYROID STIM HORMONE: CPT | Performed by: INTERNAL MEDICINE

## 2024-11-06 PROCEDURE — 36415 COLL VENOUS BLD VENIPUNCTURE: CPT | Performed by: INTERNAL MEDICINE

## 2024-11-06 PROCEDURE — 82550 ASSAY OF CK (CPK): CPT | Performed by: INTERNAL MEDICINE

## 2024-11-07 ENCOUNTER — TELEPHONE (OUTPATIENT)
Dept: INTERNAL MEDICINE | Facility: CLINIC | Age: 64
End: 2024-11-07
Payer: MEDICARE

## 2024-11-07 NOTE — TELEPHONE ENCOUNTER
----- Message from Yamilka sent at 11/7/2024  9:11 AM CST -----  Contact: Patient   [phone]  [email or web address]     Patient is calling for Medical Advice regarding:Patient reports- severe leg cramping- legs hard to touch    How long has patient had these symptoms:    Pharmacy name and phone#:    Patient wants a call back or thru [facility]:    Comments:Patient   [phone]    Please advise patient replies from provider may take up to 48 hours.

## 2024-11-07 NOTE — TELEPHONE ENCOUNTER
Spoke with pt, she stated that she saw you yesterday and that she started this morning with having Dry mouth, leg cramping and muscle tightness. Pt says her CK blood test came back elevated and she need to know if you want her to come in and get fluids or what do you recommend or suggest.    Please review and respond,  Thank You.

## 2024-11-07 NOTE — TELEPHONE ENCOUNTER
----- Message from Med Assistant Loja sent at 11/7/2024  9:21 AM CST -----  Contact: [email or web address]@ [phone]  Pt called                Pt is requesting a call back to speak with staff in regards to see provider on today for urgent visit. Pt stated that she started this morning with having Dry mouth, leg cramping and muscle tightness.

## 2024-11-08 ENCOUNTER — TELEPHONE (OUTPATIENT)
Dept: INTERNAL MEDICINE | Facility: CLINIC | Age: 64
End: 2024-11-08
Payer: MEDICARE

## 2024-11-08 NOTE — TELEPHONE ENCOUNTER
----- Message from Yolis sent at 11/8/2024 10:11 AM CST -----  Contact: [phone]@[email or web address]  Good morning patient would like a call back to discuss her urein results. Patient also wants a back x-ray. Please call patient to advise  [phone]

## 2024-11-11 LAB — ZINC SERPL-MCNC: 149 UG/DL (ref 60–130)

## 2024-11-12 ENCOUNTER — OFFICE VISIT (OUTPATIENT)
Dept: INTERNAL MEDICINE | Facility: CLINIC | Age: 64
End: 2024-11-12
Payer: MEDICARE

## 2024-11-12 ENCOUNTER — LAB VISIT (OUTPATIENT)
Dept: LAB | Facility: HOSPITAL | Age: 64
End: 2024-11-12
Payer: MEDICARE

## 2024-11-12 ENCOUNTER — HOSPITAL ENCOUNTER (EMERGENCY)
Facility: HOSPITAL | Age: 64
Discharge: HOME | End: 2024-11-13
Attending: EMERGENCY MEDICINE
Payer: MEDICARE

## 2024-11-12 VITALS
HEART RATE: 98 BPM | BODY MASS INDEX: 31.5 KG/M2 | SYSTOLIC BLOOD PRESSURE: 141 MMHG | DIASTOLIC BLOOD PRESSURE: 83 MMHG | WEIGHT: 196 LBS | RESPIRATION RATE: 18 BRPM | WEIGHT: 196.19 LBS | HEIGHT: 66 IN | BODY MASS INDEX: 31.53 KG/M2 | RESPIRATION RATE: 18 BRPM | HEIGHT: 66 IN | OXYGEN SATURATION: 98 % | TEMPERATURE: 99 F

## 2024-11-12 DIAGNOSIS — M54.50 LUMBAR PAIN: ICD-10-CM

## 2024-11-12 DIAGNOSIS — K11.7 XEROSTOMIA: ICD-10-CM

## 2024-11-12 DIAGNOSIS — M54.2 CERVICAL PAIN: ICD-10-CM

## 2024-11-12 DIAGNOSIS — R53.1 WEAKNESS: ICD-10-CM

## 2024-11-12 DIAGNOSIS — E03.9 HYPOTHYROIDISM, UNSPECIFIED TYPE: ICD-10-CM

## 2024-11-12 DIAGNOSIS — R74.8 ELEVATED CPK: ICD-10-CM

## 2024-11-12 DIAGNOSIS — M79.10 MYALGIA: ICD-10-CM

## 2024-11-12 DIAGNOSIS — M62.838 MUSCLE SPASM: ICD-10-CM

## 2024-11-12 DIAGNOSIS — R25.2 MUSCLE CRAMPS: ICD-10-CM

## 2024-11-12 DIAGNOSIS — R19.7 DIARRHEA, UNSPECIFIED TYPE: ICD-10-CM

## 2024-11-12 DIAGNOSIS — G89.29 CHRONIC BILATERAL THORACIC BACK PAIN: ICD-10-CM

## 2024-11-12 DIAGNOSIS — R11.10 VOMITING, UNSPECIFIED VOMITING TYPE, UNSPECIFIED WHETHER NAUSEA PRESENT: Primary | ICD-10-CM

## 2024-11-12 DIAGNOSIS — E86.0 DEHYDRATION: ICD-10-CM

## 2024-11-12 DIAGNOSIS — M79.10 MYALGIA: Primary | ICD-10-CM

## 2024-11-12 DIAGNOSIS — M54.6 CHRONIC BILATERAL THORACIC BACK PAIN: ICD-10-CM

## 2024-11-12 DIAGNOSIS — G47.62 NOCTURNAL LEG CRAMPS: ICD-10-CM

## 2024-11-12 LAB
ALBUMIN SERPL BCP-MCNC: 4 G/DL (ref 3.5–5.2)
ALBUMIN SERPL BCP-MCNC: 4.2 G/DL (ref 3.5–5.2)
ALP SERPL-CCNC: 102 U/L (ref 40–150)
ALT SERPL W/O P-5'-P-CCNC: 35 U/L (ref 10–44)
ANION GAP SERPL CALC-SCNC: 11 MMOL/L (ref 8–16)
ANION GAP SERPL CALC-SCNC: 11 MMOL/L (ref 8–16)
AST SERPL-CCNC: 24 U/L (ref 10–40)
BASOPHILS # BLD AUTO: 0.02 K/UL (ref 0–0.2)
BASOPHILS NFR BLD: 0.2 % (ref 0–1.9)
BILIRUB SERPL-MCNC: 0.7 MG/DL (ref 0.1–1)
BILIRUB UR QL STRIP: NEGATIVE
BUN SERPL-MCNC: 14 MG/DL (ref 8–23)
BUN SERPL-MCNC: 14 MG/DL (ref 8–23)
CALCIUM SERPL-MCNC: 9.3 MG/DL (ref 8.7–10.5)
CALCIUM SERPL-MCNC: 9.6 MG/DL (ref 8.7–10.5)
CHLORIDE SERPL-SCNC: 103 MMOL/L (ref 95–110)
CHLORIDE SERPL-SCNC: 104 MMOL/L (ref 95–110)
CK SERPL-CCNC: 163 U/L (ref 20–180)
CLARITY UR REFRACT.AUTO: CLEAR
CO2 SERPL-SCNC: 24 MMOL/L (ref 23–29)
CO2 SERPL-SCNC: 25 MMOL/L (ref 23–29)
COLOR UR AUTO: YELLOW
CREAT SERPL-MCNC: 0.7 MG/DL (ref 0.5–1.4)
CREAT SERPL-MCNC: 0.8 MG/DL (ref 0.5–1.4)
CRP SERPL-MCNC: 1.2 MG/L (ref 0–8.2)
DIFFERENTIAL METHOD BLD: ABNORMAL
EOSINOPHIL # BLD AUTO: 0 K/UL (ref 0–0.5)
EOSINOPHIL NFR BLD: 0.2 % (ref 0–8)
ERYTHROCYTE [DISTWIDTH] IN BLOOD BY AUTOMATED COUNT: 13.2 % (ref 11.5–14.5)
ERYTHROCYTE [SEDIMENTATION RATE] IN BLOOD BY PHOTOMETRIC METHOD: 17 MM/HR (ref 0–36)
EST. GFR  (NO RACE VARIABLE): >60 ML/MIN/1.73 M^2
EST. GFR  (NO RACE VARIABLE): >60 ML/MIN/1.73 M^2
FERRITIN SERPL-MCNC: 168 NG/ML (ref 20–300)
FOLATE SERPL-MCNC: 13.5 NG/ML (ref 4–24)
GLUCOSE SERPL-MCNC: 117 MG/DL (ref 70–110)
GLUCOSE SERPL-MCNC: 145 MG/DL (ref 70–110)
GLUCOSE UR QL STRIP: NEGATIVE
HCT VFR BLD AUTO: 42 % (ref 37–48.5)
HGB BLD-MCNC: 14 G/DL (ref 12–16)
HGB UR QL STRIP: NEGATIVE
IMM GRANULOCYTES # BLD AUTO: 0.04 K/UL (ref 0–0.04)
IMM GRANULOCYTES NFR BLD AUTO: 0.3 % (ref 0–0.5)
IRON SERPL-MCNC: 60 UG/DL (ref 30–160)
KETONES UR QL STRIP: ABNORMAL
LEUKOCYTE ESTERASE UR QL STRIP: NEGATIVE
LIPASE SERPL-CCNC: 22 U/L (ref 4–60)
LYMPHOCYTES # BLD AUTO: 0.5 K/UL (ref 1–4.8)
LYMPHOCYTES NFR BLD: 4 % (ref 18–48)
MCH RBC QN AUTO: 28.9 PG (ref 27–31)
MCHC RBC AUTO-ENTMCNC: 33.3 G/DL (ref 32–36)
MCV RBC AUTO: 87 FL (ref 82–98)
MONOCYTES # BLD AUTO: 0.4 K/UL (ref 0.3–1)
MONOCYTES NFR BLD: 3.2 % (ref 4–15)
NEUTROPHILS # BLD AUTO: 11.5 K/UL (ref 1.8–7.7)
NEUTROPHILS NFR BLD: 92.1 % (ref 38–73)
NITRITE UR QL STRIP: NEGATIVE
NRBC BLD-RTO: 0 /100 WBC
PH UR STRIP: 6 [PH] (ref 5–8)
PHOSPHATE SERPL-MCNC: 3 MG/DL (ref 2.7–4.5)
PLATELET # BLD AUTO: 263 K/UL (ref 150–450)
PMV BLD AUTO: 9.2 FL (ref 9.2–12.9)
POTASSIUM SERPL-SCNC: 3.5 MMOL/L (ref 3.5–5.1)
POTASSIUM SERPL-SCNC: 4.2 MMOL/L (ref 3.5–5.1)
PROT SERPL-MCNC: 7.6 G/DL (ref 6–8.4)
PROT UR QL STRIP: NEGATIVE
RBC # BLD AUTO: 4.84 M/UL (ref 4–5.4)
SATURATED IRON: 14 % (ref 20–50)
SODIUM SERPL-SCNC: 138 MMOL/L (ref 136–145)
SODIUM SERPL-SCNC: 140 MMOL/L (ref 136–145)
SP GR UR STRIP: 1.02 (ref 1–1.03)
T3FREE SERPL-MCNC: 4.3 PG/ML (ref 2.3–4.2)
T4 FREE SERPL-MCNC: 1.18 NG/DL (ref 0.71–1.51)
T4 FREE SERPL-MCNC: 1.18 NG/DL (ref 0.71–1.51)
THYROPEROXIDASE IGG SERPL-ACNC: 496.3 IU/ML
TOTAL IRON BINDING CAPACITY: 428 UG/DL (ref 250–450)
TRANSFERRIN SERPL-MCNC: 289 MG/DL (ref 200–375)
TROPONIN I SERPL DL<=0.01 NG/ML-MCNC: <0.006 NG/ML (ref 0–0.03)
TSH SERPL DL<=0.005 MIU/L-ACNC: 1.29 UIU/ML (ref 0.4–4)
URN SPEC COLLECT METH UR: ABNORMAL
VIT B12 SERPL-MCNC: >2000 PG/ML (ref 210–950)
WBC # BLD AUTO: 12.44 K/UL (ref 3.9–12.7)

## 2024-11-12 PROCEDURE — 86038 ANTINUCLEAR ANTIBODIES: CPT | Performed by: INTERNAL MEDICINE

## 2024-11-12 PROCEDURE — 85652 RBC SED RATE AUTOMATED: CPT | Performed by: INTERNAL MEDICINE

## 2024-11-12 PROCEDURE — 82746 ASSAY OF FOLIC ACID SERUM: CPT | Mod: GA | Performed by: INTERNAL MEDICINE

## 2024-11-12 PROCEDURE — 99999 PR PBB SHADOW E&M-EST. PATIENT-LVL V: CPT | Mod: PBBFAC,,, | Performed by: INTERNAL MEDICINE

## 2024-11-12 PROCEDURE — 82525 ASSAY OF COPPER: CPT | Performed by: INTERNAL MEDICINE

## 2024-11-12 PROCEDURE — 99215 OFFICE O/P EST HI 40 MIN: CPT | Mod: PBBFAC,25 | Performed by: INTERNAL MEDICINE

## 2024-11-12 PROCEDURE — 80053 COMPREHEN METABOLIC PANEL: CPT | Performed by: EMERGENCY MEDICINE

## 2024-11-12 PROCEDURE — 86140 C-REACTIVE PROTEIN: CPT | Performed by: INTERNAL MEDICINE

## 2024-11-12 PROCEDURE — 83690 ASSAY OF LIPASE: CPT | Performed by: EMERGENCY MEDICINE

## 2024-11-12 PROCEDURE — 84466 ASSAY OF TRANSFERRIN: CPT | Performed by: INTERNAL MEDICINE

## 2024-11-12 PROCEDURE — 36415 COLL VENOUS BLD VENIPUNCTURE: CPT | Performed by: INTERNAL MEDICINE

## 2024-11-12 PROCEDURE — 84484 ASSAY OF TROPONIN QUANT: CPT | Performed by: EMERGENCY MEDICINE

## 2024-11-12 PROCEDURE — 82607 VITAMIN B-12: CPT | Mod: GA | Performed by: INTERNAL MEDICINE

## 2024-11-12 PROCEDURE — 85025 COMPLETE CBC W/AUTO DIFF WBC: CPT | Performed by: EMERGENCY MEDICINE

## 2024-11-12 PROCEDURE — 86039 ANTINUCLEAR ANTIBODIES (ANA): CPT | Performed by: INTERNAL MEDICINE

## 2024-11-12 PROCEDURE — 99285 EMERGENCY DEPT VISIT HI MDM: CPT | Mod: 25,27

## 2024-11-12 PROCEDURE — 86235 NUCLEAR ANTIGEN ANTIBODY: CPT | Performed by: INTERNAL MEDICINE

## 2024-11-12 PROCEDURE — 93005 ELECTROCARDIOGRAM TRACING: CPT

## 2024-11-12 PROCEDURE — 84439 ASSAY OF FREE THYROXINE: CPT | Performed by: INTERNAL MEDICINE

## 2024-11-12 PROCEDURE — 84481 FREE ASSAY (FT-3): CPT | Performed by: INTERNAL MEDICINE

## 2024-11-12 PROCEDURE — 96361 HYDRATE IV INFUSION ADD-ON: CPT

## 2024-11-12 PROCEDURE — 81003 URINALYSIS AUTO W/O SCOPE: CPT | Performed by: EMERGENCY MEDICINE

## 2024-11-12 PROCEDURE — 82728 ASSAY OF FERRITIN: CPT | Performed by: INTERNAL MEDICINE

## 2024-11-12 PROCEDURE — 82085 ASSAY OF ALDOLASE: CPT | Performed by: INTERNAL MEDICINE

## 2024-11-12 PROCEDURE — 63600175 PHARM REV CODE 636 W HCPCS: Performed by: EMERGENCY MEDICINE

## 2024-11-12 PROCEDURE — 96374 THER/PROPH/DIAG INJ IV PUSH: CPT

## 2024-11-12 PROCEDURE — 25000003 PHARM REV CODE 250: Performed by: EMERGENCY MEDICINE

## 2024-11-12 PROCEDURE — 99214 OFFICE O/P EST MOD 30 MIN: CPT | Mod: S$PBB,,, | Performed by: INTERNAL MEDICINE

## 2024-11-12 PROCEDURE — 82550 ASSAY OF CK (CPK): CPT | Performed by: INTERNAL MEDICINE

## 2024-11-12 PROCEDURE — 80069 RENAL FUNCTION PANEL: CPT | Performed by: INTERNAL MEDICINE

## 2024-11-12 PROCEDURE — 84443 ASSAY THYROID STIM HORMONE: CPT | Performed by: INTERNAL MEDICINE

## 2024-11-12 PROCEDURE — 25500020 PHARM REV CODE 255: Performed by: EMERGENCY MEDICINE

## 2024-11-12 PROCEDURE — 93010 ELECTROCARDIOGRAM REPORT: CPT | Mod: ,,, | Performed by: INTERNAL MEDICINE

## 2024-11-12 RX ORDER — ONDANSETRON HYDROCHLORIDE 2 MG/ML
4 INJECTION, SOLUTION INTRAVENOUS
Status: COMPLETED | OUTPATIENT
Start: 2024-11-12 | End: 2024-11-12

## 2024-11-12 RX ORDER — ONDANSETRON 4 MG/1
4 TABLET, ORALLY DISINTEGRATING ORAL EVERY 6 HOURS PRN
Qty: 12 TABLET | Refills: 0 | Status: SHIPPED | OUTPATIENT
Start: 2024-11-12 | End: 2024-11-15

## 2024-11-12 RX ORDER — METHOCARBAMOL 500 MG/1
500 TABLET, FILM COATED ORAL 3 TIMES DAILY
Qty: 6 TABLET | Refills: 0 | Status: SHIPPED | OUTPATIENT
Start: 2024-11-12 | End: 2024-11-16 | Stop reason: SDUPTHER

## 2024-11-12 RX ADMIN — IOHEXOL 75 ML: 350 INJECTION, SOLUTION INTRAVENOUS at 10:11

## 2024-11-12 RX ADMIN — ONDANSETRON 4 MG: 2 INJECTION INTRAMUSCULAR; INTRAVENOUS at 09:11

## 2024-11-12 RX ADMIN — SODIUM CHLORIDE 1000 ML: 9 INJECTION, SOLUTION INTRAVENOUS at 09:11

## 2024-11-12 NOTE — PROGRESS NOTES
MEDICAL HISTORY:  Hypothyroid disease.  Pre diabetes  Fatty liver with focal nodular hyperplasia.  Anxiety disorder.  Cholecystectomy with pancreatitis.   x3.  Abdominal hernia repair.  Diverticula disease of the colon, for which she had in , a sigmoid colectomy due to diverticulitis, then a stent put in a month later because of microperforation. In 2015, she underwent a balloon dilatation due to stenosis of the colonic anastomosis        Oncology History Overview Note   Diverticulitis   1. 2014: Surgery - Lap segmental resection of sigmoid cancer  2. Complicated by anastomotic leak and subsequent stricture managed conservatively with IR drain and repeat balloon dilations  3. 12/7/15: Robo lap exploration, laparoscopic hand-assisted redo low anterior resection, hand assisted omental advancement flap, cysto and stents     SOCIAL HISTORY:  Tobacco use and alcohol use -- none.       Medications  Xanax 0.5 mg 3 times daily   cytomel 25 mcg half a tablet daily   Levothyroxine 0.088 mg daily   Flonase 2 puffs daily   Claritin once a day  Over-the-counter Pepcid as needed      64-year-old female   Presents as a follow-up visit    Since his visit with me last week she was still it was had reoccurring episodes of a spasm, tightening feeling involving the legs.  The night after visit with me she was wakening with it was spastic cramping involving upper thighs and lower legs.  Then later on 2 nights either the right or left leg she had a very transient spasm and either involving the calf muscles.  That has morning she felt a tightness involving the upper thigh region particularly the left leg that lasted for couple of hours.  She was been attempting liquid IV as well as coconut water in his trying to increase his sodium intake.    From last visit with me which was to follow-up after visit emergency room, labs were reviewed.  She was to be making arrangements to have a repeat CPK in thyroid function  panel.  That has CPK level actually was better when compared to before but still elevated.  It was noted that it was inked level was elevated.  She was taking zinc supplementation    New symptoms that she was having in his having a dry mouth.  She was having tightness involving the neck and upper back.  In his she was having ongoing back pain.  She also feels like electric shock firing sensation at times going down the legs it was transient.    She was started noticing in his symptoms after she took a dose of prednisone in Dyazide for treatment of Meniere's disease which is outlined in his 2 previous notes    Examination   Weight 196   BMI 31.67  Pulse 72   Blood pressure 128/62  She was clear breath sounds   Heart regular rate rhythm   Abdominal exam is bowel sounds soft nontender  2+ biceps triceps reflexes  2+ knee, trace ankle jerk reflexes  Motor strength of the upper and lower extremities normal.  Negative Romberg testing.  She was able to walk on heels and toes.    Impression   Myalgias/muscle cramps/muscle spasm  Lumbar pain   Cervical pain  Hypothyroid    Plan   X-rays of the lumbar spine thoracic and cervical spine.  For lab test in his CBC chemistry, TSH free T4 free T3-3, VERNA as well B12 folate levels in his CPK

## 2024-11-13 ENCOUNTER — HOSPITAL ENCOUNTER (OUTPATIENT)
Dept: RADIOLOGY | Facility: HOSPITAL | Age: 64
Discharge: HOME | End: 2024-11-13
Attending: INTERNAL MEDICINE
Payer: MEDICARE

## 2024-11-13 ENCOUNTER — PATIENT MESSAGE (OUTPATIENT)
Dept: INTERNAL MEDICINE | Facility: CLINIC | Age: 64
End: 2024-11-13
Payer: MEDICARE

## 2024-11-13 DIAGNOSIS — K76.0 HEPATIC STEATOSIS: Primary | ICD-10-CM

## 2024-11-13 DIAGNOSIS — D35.00 ADRENAL ADENOMA, UNSPECIFIED LATERALITY: ICD-10-CM

## 2024-11-13 LAB
ALDOLASE SERPL-CCNC: 3.6 U/L (ref 1.2–7.6)
ANA PATTERN 1: NORMAL
ANA PATTERN 2: NORMAL
ANA SER QL IF: POSITIVE
ANA TITER 2: NORMAL
ANA TITR SER IF: NORMAL {TITER}
OHS QRS DURATION: 80 MS
OHS QTC CALCULATION: 423 MS

## 2024-11-13 PROCEDURE — 63600175 PHARM REV CODE 636 W HCPCS: Performed by: EMERGENCY MEDICINE

## 2024-11-13 PROCEDURE — 96376 TX/PRO/DX INJ SAME DRUG ADON: CPT

## 2024-11-13 RX ORDER — ONDANSETRON HYDROCHLORIDE 2 MG/ML
4 INJECTION, SOLUTION INTRAVENOUS
Status: COMPLETED | OUTPATIENT
Start: 2024-11-13 | End: 2024-11-13

## 2024-11-13 RX ADMIN — ONDANSETRON 4 MG: 2 INJECTION INTRAMUSCULAR; INTRAVENOUS at 12:11

## 2024-11-13 NOTE — ED NOTES
Patient states leg cramps since 10/31, saw PCP today as cramps restarted, reports dry mouth, vomiting, diarrhea Started new med x 1 week for Meniere's

## 2024-11-13 NOTE — ED NOTES
Patient identifiers verified and correct for  Ms Borjas  C/C: N/V and leg cramps SEE NN  APPEARANCE: awake and alert in NAD. PAIN  10/10  SKIN: warm, dry and intact. No breakdown or bruising.  MUSCULOSKELETAL: Patient moving all extremities spontaneously, no obvious swelling or deformities noted. Ambulates independently.  RESPIRATORY: Denies shortness of breath.Respirations unlabored.   CARDIAC: Denies CP, 2+ distal pulses; no peripheral edema  ABDOMEN: ABdomen soft, reports nausea, vomiting, diarrhea   : voids spontaneously, denies difficulty  Neurologic: AAO x 4; follows commands equal strength in all extremities; denies numbness/tingling. Denies dizziness Deneis new wekaness

## 2024-11-13 NOTE — PROGRESS NOTES
INTERNAL MEDICINE NOTE    Test results from her visit reviewed  B12 was very elevated she was been taking 1500 mg daily.  She was been instructed to stop  Free T4 was at 4.3 minimally elevated in his she will start the Cytomel every other day what she was taking half a tablet  CPK inflammatory enzymes aldolase electrolytes were normal  Thyroid peroxidase antibodies were positive consistent with Hashimoto's thyroiditis but no change in management in regard to thyroid medication  VERNA profile is positive.  Differential is in process.  This was done because of dry mouth    It was noted that after visit with me that has some point this it evening went to emergency room because of cramps muscle pain loose stools  Troponin lipase chemistry was fine other than elevated glucose  That has a CT of the abdomen.  Findings could be consistent with gastroenteritis but that has a dental things it was been noted.  Hepatomegaly with hepatic steatosis consistent with known fatty liver.  Liver enzymes were fine.  Right adrenal adenoma was seen.  That has maybe new.  The only comparison was a CT of the [facility] 2016 which did not mentioned in his    It was noted that April of 2024 she was at [facility] had a colonoscopy which revealed 2 adenomatous colon polyps.    She also got tested for hepatitis-C and hepatitis-B which was negative.  She was received a hepatitis-B vaccine      Referral to endocrinology regarding adrenal adenoma and referral to hepatology regarding hepatic steatosis was placed

## 2024-11-13 NOTE — DISCHARGE INSTRUCTIONS
I recommend that you stop taking zinc supplementation.    Your workup showed multiple incidental findings which are not related to why you are here today.  These should be followed up by your primary care doctor.  First, you have some lesions in the liver that look like cysts.  Next, the common bile duct, which is a part of your biliary tract, is slightly enlarged.  This may need to be followed up by your doctor as well.  Finally, the CT scan showed that your adrenal gland on the left side looked slightly abnormal, and may need outpatient follow up.    Diagnosis: Vomiting    Tests today showed:   Labs Reviewed   CBC W/ AUTO DIFFERENTIAL - Abnormal       Result Value    WBC 12.44      RBC 4.84      Hemoglobin 14.0      Hematocrit 42.0      MCV 87      MCH 28.9      MCHC 33.3      RDW 13.2      Platelets 263      MPV 9.2      Immature Granulocytes 0.3      Gran # (ANC) 11.5 (*)     Immature Grans (Abs) 0.04      Lymph # 0.5 (*)     Mono # 0.4      Eos # 0.0      Baso # 0.02      nRBC 0      Gran % 92.1 (*)     Lymph % 4.0 (*)     Mono % 3.2 (*)     Eosinophil % 0.2      Basophil % 0.2      Differential Method Automated     COMPREHENSIVE METABOLIC PANEL - Abnormal    Sodium 138      Potassium 3.5      Chloride 103      CO2 24      Glucose 145 (*)     BUN 14      Creatinine 0.8      Calcium 9.3      Total Protein 7.6      Albumin 4.0      Total Bilirubin 0.7      Alkaline Phosphatase 102      AST 24      ALT 35      eGFR >60.0      Anion Gap 11     URINALYSIS, REFLEX TO URINE CULTURE - Abnormal    Specimen UA Urine, Clean Catch      Color, UA Yellow      Appearance, UA Clear      pH, UA 6.0      Specific Gravity, UA 1.020      Protein, UA Negative      Glucose, UA Negative      Ketones, UA 1+ (*)     Bilirubin (UA) Negative      Occult Blood UA Negative      Nitrite, UA Negative      Leukocytes, UA Negative      Narrative:     Specimen Source->Urine   LIPASE    Lipase 22     TROPONIN I    Troponin I <0.006     ISTAT  CHEM8     Imaging Results               CT Abdomen Pelvis With IV Contrast NO Oral Contrast (Final result)  Result time 11/12/24 23:07:22      Final result by Chato Gonzalez MD (11/12/24 23:07:22)                   Impression:      Abdomen CT and Pelvis CT:    Intestinal findings compatible with a diarrheal illness, such as gastroenteritis.    Interval sigmoidectomy.  No acute CT abnormality at the colocolic anastomosis.    Linear metallic opacities in the ascending colon, possibly representing endoscopic clips.  Correlate clinically.    Hepatomegaly and probable diffuse hepatic steatosis.    Increased size and number of hypodense lesions in the liver.  These are not fully characterized.  Although these might all represent cysts, some hepatic masses are not fully excluded.  Correlate clinically, and with follow-up outpatient imaging, which could include sonography and/or liver mass protocol CT or MRI.    Slightly increased common bile duct dilatation, up to approximately 11 mm, possibly representing post-cholecystectomy reservoir effect.  Correlate clinically.  If there is strong clinical suspicion for biliary tract abnormality, outpatient MRI and MRCP would be recommended.  In the absence of suspicious clinical findings, no further workup is likely required at this time.    1 cm nodular fullness at the apex of the left adrenal gland, possibly an adenoma.  Other lesions not excluded.  Correlate clinically, and with follow-up outpatient imaging.    Small hiatal hernia, with possible gastroesophageal reflux.    Additional observations as detailed in the body of the report.    This report was flagged in Epic as abnormal.    This report was flagged in Epic as abnormal.      Electronically signed by: Chato Gonzalez  Date:    11/12/2024  Time:    23:07               Narrative:    EXAMINATION:  CT ABDOMEN PELVIS WITH IV CONTRAST    CLINICAL HISTORY:  Epigastric pain;    TECHNIQUE:  Low dose axial images, sagittal and  coronal reformations were obtained from the lung bases to the pubic symphysis following the IV administration of 75 mL of Omnipaque 350.    COMPARISON:  Ultrasound pelvis 03/08/2018.    CT abdomen and pelvis with IV contrast, 11/02/2014    FINDINGS:  Artifacts related to beam hardening and/or motion degrade portions of the scans.    Abdomen CT:    Lower chest: Visualized caudal portions of the lungs, pleura, mediastinum, heart, and pericardium demonstrate no acute CT abnormalities.    Given the presence of a small hiatal hernia, the trace intraluminal gas in the distal esophagus possibly represents gastroesophageal reflux.    Liver: Remains enlarged, with right hepatic lobe craniocaudal dimension of approximately 19 cm.    Increased number and size of several hypodense intrahepatic lesions, involving portions of both hepatic lobes.  The largest is in the posterosuperior subcapsular aspect of the left hepatic lobe lateral segment and measures approximately 2.6 x 2.6 cm in axial dimension.    At approximately 71 HU, the liver parenchyma appears diffusely hypoattenuating relative to the 102 HU splenic parenchyma; this suggests some underlying diffuse hepatic steatosis.    Gallbladder: Absent    Bile ducts: No intrahepatic biliary ductal dilatation.  Mild extrahepatic biliary ductal dilatation, up to approximately 11 mm as the CBD enters the pancreas.    Pancreas: No discrete mass.  No acute inflammatory changes.    Spleen: Normal size.    Adrenal glands: Nodular 1 cm fullness at the apex of the left adrenal gland, not fully characterized on this scan but favored to represent an adenoma.    Normal right adrenal gland.    Kidneys: Prompt and symmetric cortical nephrograms bilaterally.  Bilateral extrarenal pelves, minimally distended, right greater than left.  No right hydroureter.  Left ureter minimally distended by fluid, without CT evidence of an obstructing lesion.    Punctate right renal hilar calculus versus  atherosclerotic calcification.  No radiopaque calculi in the left kidney, either ureter, urinary bladder, or urethra.    A 1.7 cm 9 HU partially exophytic lesion arising from the medial aspect of the right upper renal pole has mildly enlarged since 11/02/2014; although it is not fully characterized on today's scan, it most likely represents a simple cyst.    Stomach: Small hiatal hernia.  The gastric lumen is poorly distended, limiting CT assessment for mural or mucosal thickening.    Duodenum: Normal course.  First and 2nd segments are fluid-filled and partially distended.  Some mild mucosal thickening is questioned, as could be seen with duodenitis.    Small bowel and large bowel are extensively fluid-filled, with some scattered intraluminal gas is well, and with absence or marked paucity of colonic fecal residue.  These findings are most compatible with a diarrheal illness, such as gastroenteritis.  There is some mild dilatation of a small portion of the small bowel, but there is no corresponding sharp transition point to strongly suggest a high-grade mechanical intestinal obstruction.    Note is made of prior surgical changes of partial sigmoid colectomy with a colocolic anastomosis that demonstrates no acute CT abnormality.  The previously demonstrated rectosigmoid colonic stent, and perisigmoid extraluminal collections, are no longer present.    Mild diverticulosis coli, no acute diverticulitis.    Linear oblong hyperdensities in right colon possibly represent endoscopic clips.    Appendix: Normal    Peritoneal cavity: No free intraperitoneal air.  No intraperitoneal fluid.    Vasculature: Normal course and caliber of the abdominal aorta and IVC.  Mild  atherosclerotic changes.    Lymph nodes: By size criteria, no abdominal or pelvic lymphadenopathy is demonstrated.    Pelvis CT:    Uterus: Normal size.  No acute abnormality.    Ovaries: Inconspicuous, or perhaps surgically absent.    Vagina: Trace  intraluminal gas.  Likely physiologic.    Urinary bladder: Poorly distended.    Body wall: Small fat-containing supraumbilical hernia.  Small broad necked umbilical hernia containing normal appearing bowel.    Musculoskeletal: Scattered degenerative changes, including multilevel degenerative disc disease in the lumbar spine, with some interval progression from the comparison study.  Slight multilevel lumbar listhesis, likely on the basis of the degenerative changes.                                      Treatments you had today:   Medications   sodium chloride 0.9% bolus 1,000 mL 1,000 mL (0 mLs Intravenous Stopped 11/12/24 2217)   ondansetron injection 4 mg (4 mg Intravenous Given 11/12/24 2116)   iohexoL (OMNIPAQUE 350) injection 75 mL (75 mLs Intravenous Given 11/12/24 2211)       Home Care Instructions include:  - Continue taking your home medications as prescribed    Take the ondansetron (Zofran) as prescribed.  Place it under your tongue and let the medication dissolve on its own  Do not swallow whole  Give the medication 30 minutes to work before eating or drinking  Drink clear fluids (water, gatorade, broth, etc) and eat simple foods  Do not eat fatty, greasy, heavy meals when using this medication    Follow-up plan:  - Follow-up with: Primary care doctor within 3 - 5  days  - Follow-up for additional testing and/or evaluation as directed by your primary doctor    Return to the emergency department for symptoms including but not limited to: worsening symptoms, persistent abdominal pain, shortness of breath or chest pain, vomiting with inability to hold down fluids, fevers greater than 100.4°F, bloody vomit or poop, passing out/unconsciousness, or other concerning symptoms.

## 2024-11-13 NOTE — ED PROVIDER NOTES
Source of History:  Patient  Chart    Chief complaint:  Multiple Complaints (Dry mouth, muscle cramping, weakness, nausea and diarrhea. Phenergan last taken at 1847 PTA)      HPI:  Abiola Borjas is a 64 y.o. female with history of prediabetes, hypothyroidism, anxiety disorder, status post cholecystectomy and abdominal hernia repair, diverticular disease with with prior microperforation and sigmoid colectomy, presenting to emergency department with complaint of generalized weakness, dry mouth, nausea, diarrhea, abdominal pain.    Patient was seen in her primary care doctor's office earlier today for myalgias/nocturnal leg cramps.  At that time she also complained of dry mouth that was present since that morning.    Patient had blood work done today.  Normal CPK.  She had a normal sed rate and CRP.  TSH was normal, normal free T4, mild elevation of free T3 and also found to have elevated thyroperoxidase antibodies.    She states that after her primary care visit she began to vomit.  She was had 3 episodes of nonbloody nonbilious vomiting.  Innumerable episodes of loose watery nonbloody stool.  She complains of upper abdominal pain.  States that she had pancreatitis once from gallstones but is status post cholecystectomy.  She took a dose of Phenergan that she had at home but vomited an hour later.    No dysuria or hematuria.  No difficulty breathing.  No chest pain.  No known sick contacts.    She notes that she was recently told that her zinc level was high.  She takes is inked supplementation and was concerned that this could be related.    Review of patient's allergies indicates:  No Known Allergies    No current facility-administered medications on file prior to encounter.     Current Outpatient Medications on File Prior to Encounter   Medication Sig Dispense Refill    ALPRAZolam (XANAX) 0.5 MG tablet Take 0.5 mg by mouth 3 (three) times daily as needed.       promethazine (PHENERGAN) 25 MG tablet Take 25 mg  by mouth every 12 (twelve) hours as needed.      SYNTHROID 88 mcg tablet TAKE 1 TABLET(88 MCG) BY MOUTH BEFORE BREAKFAST 90 tablet 3    acetaminophen (TYLENOL) 500 MG tablet Take 1,000 mg by mouth.      azelastine (ASTELIN) 137 mcg (0.1 %) nasal spray 2 sprays (274 mcg total) by Nasal route 2 (two) times daily. 30 mL 0    cyanocobalamin (VITAMIN B-12) 100 MCG tablet Take 100 mcg by mouth once daily.      CYTOMEL 25 mcg Tab TAKE 1/2 A TABLET BY MOUTH DAILY 45 tablet 5    diazePAM (VALIUM) 2 MG tablet Take 2 mg by mouth 3 (three) times daily.      FLUCELVAX QUAD 6445-3178, PF, 60 mcg (15 mcg x 4)/0.5 mL Syrg       fluticasone propionate (FLONASE) 50 mcg/actuation nasal spray 1 spray (50 mcg total) by Each Nostril route once daily. 16 g 2    ibuprofen (ADVIL,MOTRIN) 200 MG tablet Take 200 mg by mouth every 8 (eight) hours as needed.      Lactobacillus rhamnosus GG (CULTURELLE) 10 billion cell capsule Take 1 capsule by mouth once daily.      lancets Misc 1 Device by Misc.(Non-Drug; Combo Route) route 2 (two) times daily. 100 each 11    loratadine (CLARITIN) 10 mg tablet Take 10 mg by mouth once daily.      magnesium 30 mg Tab Take by mouth once. 250 mg twice a day      melatonin 3 mg Tab Take 5 mg by mouth.      methocarbamoL (ROBAXIN) 500 MG Tab Take 1 tablet (500 mg total) by mouth 3 (three) times daily. 6 tablet 0    multivitamin capsule Take 1 capsule by mouth once daily.      omega-3 fatty acids/fish oil (FISH OIL-OMEGA-3 FATTY ACIDS) 300-1,000 mg capsule Take by mouth once daily.      triamterene-hydrochlorothiazide 37.5-25 mg (DYAZIDE) 37.5-25 mg per capsule Take 1 capsule by mouth once daily.      TRUE METRIX GLUCOSE TEST STRIP Strp 1 STRIP BY SessionM.(NON-DRUG COMBO ROUTE) ROUTE 2 (TWO) TIMES DAILY. 100 strip 10    turmeric 400 mg Cap Take 1 tablet by mouth every 12 (twelve) hours.      vitamin C-multivitamin-mineral 500 mg Chew Take by mouth.      vitamin D (VITAMIN D3) 1000 units Tab Take 1,000 Units by mouth  once daily.      zinc gluconate 50 mg tablet Take 50 mg by mouth once daily.         PMH:  As per HPI and below:  Past Medical History:   Diagnosis Date    Allergy     Anxiety     Colon polyps     Deviated septum     Diverticulosis of sigmoid colon     Focal nodular hyperplasia of liver     H/O bone density study 2014    Normal  @ [facility]    Hormone replacement therapy (HRT)     Hypoglycemia     Hypothyroidism     PONV (postoperative nausea and vomiting)     Snores     Thyroid disease      Past Surgical History:   Procedure Laterality Date    ABDOMINAL HERNIA REPAIR       SECTION      CHOLECYSTECTOMY      COLECTOMY  2015    [facility] for stricture    COLON SURGERY  2015    [facility] for Stricture     COLONOSCOPY  2015    Stricture     ENDOMETRIAL ABLATION      HERNIA REPAIR         Social History     Socioeconomic History    Marital status:     Number of children: 3   Tobacco Use    Smoking status: Never    Smokeless tobacco: Never   Substance and Sexual Activity    Alcohol use: Not Currently     Alcohol/week: 7.0 standard drinks of alcohol     Types: 7 Glasses of wine per week     Comment: social    Drug use: No    Sexual activity: Not Currently     Partners: Male     Birth control/protection: Post-menopausal, None, See Surgical Hx     Comment: Endometrial Ablation       Family History   Problem Relation Name Age of Onset    Diabetes Mother      Hypertension Mother      Kidney cancer Father      Heart disease Father      Depression Sister      Breast cancer Paternal Aunt  70    Heart disease Paternal Grandfather      Prostate cancer Other      Leukemia Other          Medicine induced from chemo    Colon cancer Neg Hx      Miscarriages / Stillbirths Neg Hx         Physical Exam:      Vitals:    24 2316   BP: (!) 141/83   Pulse: 98   Resp: 18   Temp: 99 °F (37.2 °C)     Gen:  Anxious, nontoxic.  Holding an emesis bag.  Mental Status:  Alert and oriented .  Appropriate,  conversant.  Skin: Warm, dry. No rashes seen.  Eyes: No conjunctival injection.  ENT:  Dry lips and tongue  Pulm: CTAB. No increased work of breathing.  No significant tachypnea.  No audible stridor or wheezing.  No conversational dyspnea.    CV: Regular rate. Regular rhythm.   Abd: Soft.  Not distended.  Tenderness to palpation of the epigastric region.  No rebound tenderness or guarding.  MSK: Good range of motion all joints.  No deformities.    Neuro: Awake. Speech normal. No focal neuro deficit observed.      Laboratory Studies:  Labs Reviewed   CBC W/ AUTO DIFFERENTIAL - Abnormal       Result Value    WBC 12.44      RBC 4.84      Hemoglobin 14.0      Hematocrit 42.0      MCV 87      MCH 28.9      MCHC 33.3      RDW 13.2      Platelets 263      MPV 9.2      Immature Granulocytes 0.3      Gran # (ANC) 11.5 (*)     Immature Grans (Abs) 0.04      Lymph # 0.5 (*)     Mono # 0.4      Eos # 0.0      Baso # 0.02      nRBC 0      Gran % 92.1 (*)     Lymph % 4.0 (*)     Mono % 3.2 (*)     Eosinophil % 0.2      Basophil % 0.2      Differential Method Automated     COMPREHENSIVE METABOLIC PANEL - Abnormal    Sodium 138      Potassium 3.5      Chloride 103      CO2 24      Glucose 145 (*)     BUN 14      Creatinine 0.8      Calcium 9.3      Total Protein 7.6      Albumin 4.0      Total Bilirubin 0.7      Alkaline Phosphatase 102      AST 24      ALT 35      eGFR >60.0      Anion Gap 11     URINALYSIS, REFLEX TO URINE CULTURE - Abnormal    Specimen UA Urine, Clean Catch      Color, UA Yellow      Appearance, UA Clear      pH, UA 6.0      Specific Gravity, UA 1.020      Protein, UA Negative      Glucose, UA Negative      Ketones, UA 1+ (*)     Bilirubin (UA) Negative      Occult Blood UA Negative      Nitrite, UA Negative      Leukocytes, UA Negative      Narrative:     Specimen Source->Urine   LIPASE    Lipase 22     TROPONIN I    Troponin I <0.006     ISTAT CHEM8       EKG (independently interpreted by me):  Normal sinus  rhythm, rate 91.  No STEMI.  QRS 80 milliseconds.   milliseconds.    Chart reviewed.     Imaging Results               CT Abdomen Pelvis With IV Contrast NO Oral Contrast (Final result)  Result time 11/12/24 23:07:22      Final result by Chato Gonzalez MD (11/12/24 23:07:22)                   Impression:      Abdomen CT and Pelvis CT:    Intestinal findings compatible with a diarrheal illness, such as gastroenteritis.    Interval sigmoidectomy.  No acute CT abnormality at the colocolic anastomosis.    Linear metallic opacities in the ascending colon, possibly representing endoscopic clips.  Correlate clinically.    Hepatomegaly and probable diffuse hepatic steatosis.    Increased size and number of hypodense lesions in the liver.  These are not fully characterized.  Although these might all represent cysts, some hepatic masses are not fully excluded.  Correlate clinically, and with follow-up outpatient imaging, which could include sonography and/or liver mass protocol CT or MRI.    Slightly increased common bile duct dilatation, up to approximately 11 mm, possibly representing post-cholecystectomy reservoir effect.  Correlate clinically.  If there is strong clinical suspicion for biliary tract abnormality, outpatient MRI and MRCP would be recommended.  In the absence of suspicious clinical findings, no further workup is likely required at this time.    1 cm nodular fullness at the apex of the left adrenal gland, possibly an adenoma.  Other lesions not excluded.  Correlate clinically, and with follow-up outpatient imaging.    Small hiatal hernia, with possible gastroesophageal reflux.    Additional observations as detailed in the body of the report.    This report was flagged in Epic as abnormal.    This report was flagged in Epic as abnormal.      Electronically signed by: Chato Gonzalez  Date:    11/12/2024  Time:    23:07               Narrative:    EXAMINATION:  CT ABDOMEN PELVIS WITH IV  CONTRAST    CLINICAL HISTORY:  Epigastric pain;    TECHNIQUE:  Low dose axial images, sagittal and coronal reformations were obtained from the lung bases to the pubic symphysis following the IV administration of 75 mL of Omnipaque 350.    COMPARISON:  Ultrasound pelvis 03/08/2018.    CT abdomen and pelvis with IV contrast, 11/02/2014    FINDINGS:  Artifacts related to beam hardening and/or motion degrade portions of the scans.    Abdomen CT:    Lower chest: Visualized caudal portions of the lungs, pleura, mediastinum, heart, and pericardium demonstrate no acute CT abnormalities.    Given the presence of a small hiatal hernia, the trace intraluminal gas in the distal esophagus possibly represents gastroesophageal reflux.    Liver: Remains enlarged, with right hepatic lobe craniocaudal dimension of approximately 19 cm.    Increased number and size of several hypodense intrahepatic lesions, involving portions of both hepatic lobes.  The largest is in the posterosuperior subcapsular aspect of the left hepatic lobe lateral segment and measures approximately 2.6 x 2.6 cm in axial dimension.    At approximately 71 HU, the liver parenchyma appears diffusely hypoattenuating relative to the 102 HU splenic parenchyma; this suggests some underlying diffuse hepatic steatosis.    Gallbladder: Absent    Bile ducts: No intrahepatic biliary ductal dilatation.  Mild extrahepatic biliary ductal dilatation, up to approximately 11 mm as the CBD enters the pancreas.    Pancreas: No discrete mass.  No acute inflammatory changes.    Spleen: Normal size.    Adrenal glands: Nodular 1 cm fullness at the apex of the left adrenal gland, not fully characterized on this scan but favored to represent an adenoma.    Normal right adrenal gland.    Kidneys: Prompt and symmetric cortical nephrograms bilaterally.  Bilateral extrarenal pelves, minimally distended, right greater than left.  No right hydroureter.  Left ureter minimally distended by fluid,  without CT evidence of an obstructing lesion.    Punctate right renal hilar calculus versus atherosclerotic calcification.  No radiopaque calculi in the left kidney, either ureter, urinary bladder, or urethra.    A 1.7 cm 9 HU partially exophytic lesion arising from the medial aspect of the right upper renal pole has mildly enlarged since 11/02/2014; although it is not fully characterized on today's scan, it most likely represents a simple cyst.    Stomach: Small hiatal hernia.  The gastric lumen is poorly distended, limiting CT assessment for mural or mucosal thickening.    Duodenum: Normal course.  First and 2nd segments are fluid-filled and partially distended.  Some mild mucosal thickening is questioned, as could be seen with duodenitis.    Small bowel and large bowel are extensively fluid-filled, with some scattered intraluminal gas is well, and with absence or marked paucity of colonic fecal residue.  These findings are most compatible with a diarrheal illness, such as gastroenteritis.  There is some mild dilatation of a small portion of the small bowel, but there is no corresponding sharp transition point to strongly suggest a high-grade mechanical intestinal obstruction.    Note is made of prior surgical changes of partial sigmoid colectomy with a colocolic anastomosis that demonstrates no acute CT abnormality.  The previously demonstrated rectosigmoid colonic stent, and perisigmoid extraluminal collections, are no longer present.    Mild diverticulosis coli, no acute diverticulitis.    Linear oblong hyperdensities in right colon possibly represent endoscopic clips.    Appendix: Normal    Peritoneal cavity: No free intraperitoneal air.  No intraperitoneal fluid.    Vasculature: Normal course and caliber of the abdominal aorta and IVC.  Mild  atherosclerotic changes.    Lymph nodes: By size criteria, no abdominal or pelvic lymphadenopathy is demonstrated.    Pelvis CT:    Uterus: Normal size.  No acute  abnormality.    Ovaries: Inconspicuous, or perhaps surgically absent.    Vagina: Trace intraluminal gas.  Likely physiologic.    Urinary bladder: Poorly distended.    Body wall: Small fat-containing supraumbilical hernia.  Small broad necked umbilical hernia containing normal appearing bowel.    Musculoskeletal: Scattered degenerative changes, including multilevel degenerative disc disease in the lumbar spine, with some interval progression from the comparison study.  Slight multilevel lumbar listhesis, likely on the basis of the degenerative changes.                                      Medications Given:  Medications   sodium chloride 0.9% bolus 1,000 mL 1,000 mL (0 mLs Intravenous Stopped 11/12/24 2217)   ondansetron injection 4 mg (4 mg Intravenous Given 11/12/24 2116)   iohexoL (OMNIPAQUE 350) injection 75 mL (75 mLs Intravenous Given 11/12/24 2211)   ondansetron injection 4 mg (4 mg Intravenous Given 11/13/24 0007)       MDM:    64 y.o. female with complaint of generalized weakness, drive off, vomiting, abdominal pain, diarrhea.  Also complains of chronic nocturnal leg cramps.      Will obtain labs, urinalysis, CT.  Will give fluids, Zofran.    Labs reviewed.  No leukocytosis.  No acute anemia.  CMP reassuring, no acute kidney injury or electrolyte disturbance.  Normal AST, ALT, lipase.  Normal T bili.  Normal troponin.  Urinalysis with 1+ ketones consistent with dehydration.      CT abdomen pelvis findings reviewed.  Consistent with diarrheal illness.  Multiple incidental findings were noted.      Patient was reassessed, tolerating oral intake.  Discomfort has improved.  She was informed about all findings including incidental findings and plans to follow up with her primary care doctor.  She was discharged home with prescription for Zofran.  Return precautions discussed at bedside.    Diagnostic Impression:    1. Vomiting, unspecified vomiting type, unspecified whether nausea present    2. Weakness    3.  Diarrhea, unspecified type    4. Nocturnal leg cramps    5. Dehydration         ED Disposition Condition    Discharge Stable          ED Prescriptions       Medication Sig Dispense Start Date End Date Auth. Provider    ondansetron (ZOFRAN-ODT) 4 MG TbDL Take 1 tablet (4 mg total) by mouth every 6 (six) hours as needed. 12 tablet 11/12/2024 11/15/2024 Angelina Steward MD          Follow-up Information       Follow up With Specialties Details Why Contact Info    César Contreras MD Internal Medicine Schedule an appointment as soon as possible for a visit   [address]  [place] [address]  [phone]              Patient understands the plan and is in agreement, verbalized understanding, questions answered    Angelina Steward MD  Emergency Medicine         Angelina Steward MD  11/13/24 0040

## 2024-11-15 ENCOUNTER — TELEPHONE (OUTPATIENT)
Dept: INTERNAL MEDICINE | Facility: CLINIC | Age: 64
End: 2024-11-15
Payer: MEDICARE

## 2024-11-15 LAB
ANTI SM ANTIBODY: 0.09 RATIO (ref 0–0.99)
ANTI SM/RNP ANTIBODY: 0.06 RATIO (ref 0–0.99)
ANTI-SM INTERPRETATION: NEGATIVE
ANTI-SM/RNP INTERPRETATION: NEGATIVE
ANTI-SSA ANTIBODY: 0.09 RATIO (ref 0–0.99)
ANTI-SSA INTERPRETATION: NEGATIVE
ANTI-SSB ANTIBODY: 0.07 RATIO (ref 0–0.99)
ANTI-SSB INTERPRETATION: NEGATIVE
COPPER SERPL-MCNC: 1127 UG/L (ref 810–1990)
DSDNA AB SER-ACNC: NORMAL [IU]/ML

## 2024-11-15 NOTE — TELEPHONE ENCOUNTER
----- Message from Emmy sent at 11/15/2024  1:55 PM CST -----  Contact: Abiola   Abiola would like a call back. She has questions testing she had done & also about leg cramps  She would like a call back today

## 2024-11-16 ENCOUNTER — PATIENT MESSAGE (OUTPATIENT)
Dept: INTERNAL MEDICINE | Facility: CLINIC | Age: 64
End: 2024-11-16
Payer: MEDICARE

## 2024-11-16 DIAGNOSIS — D35.00 ADRENAL ADENOMA, UNSPECIFIED LATERALITY: Primary | ICD-10-CM

## 2024-11-16 RX ORDER — METHOCARBAMOL 500 MG/1
500 TABLET, FILM COATED ORAL NIGHTLY
Qty: 30 TABLET | Refills: 0 | Status: SHIPPED | OUTPATIENT
Start: 2024-11-16 | End: 2024-11-16 | Stop reason: SDUPTHER

## 2024-11-16 RX ORDER — METHOCARBAMOL 500 MG/1
500 TABLET, FILM COATED ORAL NIGHTLY
Qty: 30 TABLET | Refills: 0 | Status: SHIPPED | OUTPATIENT
Start: 2024-11-16

## 2024-11-18 ENCOUNTER — DOCUMENTATION ONLY (OUTPATIENT)
Dept: INTERNAL MEDICINE | Facility: CLINIC | Age: 64
End: 2024-11-18
Payer: MEDICARE

## 2024-11-18 NOTE — PROGRESS NOTES
Internal medicine note    The VERNA profile was reviewed with the patient.  Although the VERNA was positive the subsets for lupus, Sjogren's syndrome, mixed connective tissue was negative.  She was not having any arthralgias    She was still requires use of methocarbamol for leg cramps.  She takes this at night.  However she also has a it was feeling that her legs active, spasming, feels excitable although legs do not move    It was possible that this may be that of restless legs syndrome.    It would be reasonable in recommended have neurology appointment to evaluate the symptoms she has of the legs at night.  For the time being continue with methocarbamol since it was been helpful.      She will be going to [facility] for referral to endocrinology and hepatology regarding adrenal adenoma and hepatic steatosis it was suggest referral to Neurology in regard to nocturnal leg cramps and possible restless legs syndrome

## 2024-11-19 ENCOUNTER — HOSPITAL ENCOUNTER (OUTPATIENT)
Dept: RADIOLOGY | Facility: HOSPITAL | Age: 64
Discharge: HOME | End: 2024-11-19
Attending: INTERNAL MEDICINE
Payer: MEDICARE

## 2024-11-19 DIAGNOSIS — R25.2 MUSCLE CRAMPS: ICD-10-CM

## 2024-11-19 DIAGNOSIS — M79.10 MYALGIA: ICD-10-CM

## 2024-11-19 DIAGNOSIS — M62.838 MUSCLE SPASM: ICD-10-CM

## 2024-11-19 DIAGNOSIS — M54.2 CERVICAL PAIN: ICD-10-CM

## 2024-11-19 DIAGNOSIS — K11.7 XEROSTOMIA: ICD-10-CM

## 2024-11-19 DIAGNOSIS — M79.605 LUMBAR PAIN WITH RADIATION DOWN BOTH LEGS: ICD-10-CM

## 2024-11-19 DIAGNOSIS — M54.50 LUMBAR PAIN: ICD-10-CM

## 2024-11-19 DIAGNOSIS — G89.29 CHRONIC BILATERAL THORACIC BACK PAIN: ICD-10-CM

## 2024-11-19 DIAGNOSIS — E03.9 HYPOTHYROIDISM, UNSPECIFIED TYPE: ICD-10-CM

## 2024-11-19 DIAGNOSIS — R74.8 ELEVATED CPK: ICD-10-CM

## 2024-11-19 DIAGNOSIS — M54.50 LUMBAR PAIN WITH RADIATION DOWN BOTH LEGS: ICD-10-CM

## 2024-11-19 DIAGNOSIS — M79.604 LUMBAR PAIN WITH RADIATION DOWN BOTH LEGS: ICD-10-CM

## 2024-11-19 DIAGNOSIS — M54.6 CHRONIC BILATERAL THORACIC BACK PAIN: ICD-10-CM

## 2024-11-19 PROCEDURE — 72070 X-RAY EXAM THORAC SPINE 2VWS: CPT | Mod: 26,,, | Performed by: RADIOLOGY

## 2024-11-19 PROCEDURE — 72040 X-RAY EXAM NECK SPINE 2-3 VW: CPT | Mod: 26,,, | Performed by: RADIOLOGY

## 2024-11-19 PROCEDURE — 72040 X-RAY EXAM NECK SPINE 2-3 VW: CPT | Mod: TC

## 2024-11-19 PROCEDURE — 72070 X-RAY EXAM THORAC SPINE 2VWS: CPT | Mod: TC

## 2024-11-19 PROCEDURE — 72110 X-RAY EXAM L-2 SPINE 4/>VWS: CPT | Mod: TC

## 2024-11-19 PROCEDURE — 72110 X-RAY EXAM L-2 SPINE 4/>VWS: CPT | Mod: 26,,, | Performed by: RADIOLOGY

## 2024-11-22 ENCOUNTER — LAB VISIT (OUTPATIENT)
Dept: LAB | Facility: HOSPITAL | Age: 64
End: 2024-11-22
Attending: INTERNAL MEDICINE
Payer: MEDICARE

## 2024-11-22 DIAGNOSIS — D35.00 ADRENAL ADENOMA, UNSPECIFIED LATERALITY: ICD-10-CM

## 2024-11-22 LAB
CORTIS SERPL-MCNC: 18.3 UG/DL (ref 4.3–22.4)
TESTOST SERPL-MCNC: 18 NG/DL (ref 5–73)

## 2024-11-22 PROCEDURE — 82533 TOTAL CORTISOL: CPT | Performed by: INTERNAL MEDICINE

## 2024-11-22 PROCEDURE — 84403 ASSAY OF TOTAL TESTOSTERONE: CPT | Performed by: INTERNAL MEDICINE

## 2024-11-22 PROCEDURE — 82088 ASSAY OF ALDOSTERONE: CPT | Performed by: INTERNAL MEDICINE

## 2024-11-22 PROCEDURE — 82671 ASSAY OF ESTROGENS: CPT | Performed by: INTERNAL MEDICINE

## 2024-11-25 LAB — ALDOST SERPL-MCNC: 22.9 NG/DL

## 2024-11-29 LAB
ESTRADIOL SERPL HS-MCNC: 4 PG/ML
ESTROGEN SERPL CALC-MCNC: 32 PG/ML
ESTRONE SERPL-MCNC: 28 PG/ML

## 2024-12-18 NOTE — PROGRESS NOTES
MEDICAL HISTORY:  Hypothyroid disease.  Pre diabetes  Fatty liver with focal nodular hyperplasia.  Anxiety disorder.  Cholecystectomy with pancreatitis.   x3.  Abdominal hernia repair.  Diverticula disease of the colon, for which she had in , a sigmoid colectomy due to diverticulitis, then a stent put in a month later because of microperforation. In 2015, she underwent a balloon dilatation due to stenosis of the colonic anastomosis        Oncology History Overview Note   Diverticulitis   1. 2014: Surgery - Lap segmental resection of sigmoid cancer  2. Complicated by anastomotic leak and subsequent stricture managed conservatively with IR drain and repeat balloon dilations  3. 12/7/15: Robo lap exploration, laparoscopic hand-assisted redo low anterior resection, hand assisted omental advancement flap, cysto and stents     SOCIAL HISTORY:  Tobacco use and alcohol use -- none.       Medications  Xanax 0.5 mg 3 times daily   cytomel 25 mcg half a tablet daily   Levothyroxine 0.088 mg daily   Flonase 2 puffs daily   Claritin once a day  Over-the-counter Pepcid as needed

## 2024-12-19 ENCOUNTER — OFFICE VISIT (OUTPATIENT)
Dept: INTERNAL MEDICINE | Facility: CLINIC | Age: 64
End: 2024-12-19
Payer: MEDICARE

## 2024-12-19 ENCOUNTER — LAB VISIT (OUTPATIENT)
Dept: LAB | Facility: HOSPITAL | Age: 64
End: 2024-12-19
Attending: INTERNAL MEDICINE
Payer: MEDICARE

## 2024-12-19 VITALS
WEIGHT: 199.06 LBS | HEIGHT: 66 IN | SYSTOLIC BLOOD PRESSURE: 132 MMHG | BODY MASS INDEX: 31.99 KG/M2 | HEART RATE: 94 BPM | OXYGEN SATURATION: 96 % | DIASTOLIC BLOOD PRESSURE: 78 MMHG

## 2024-12-19 DIAGNOSIS — H81.09 MENIERE'S DISEASE, UNSPECIFIED LATERALITY: ICD-10-CM

## 2024-12-19 DIAGNOSIS — E03.9 HYPOTHYROIDISM, UNSPECIFIED TYPE: Primary | ICD-10-CM

## 2024-12-19 DIAGNOSIS — M47.27 LUMBOSACRAL SPONDYLOSIS WITH RADICULOPATHY: ICD-10-CM

## 2024-12-19 DIAGNOSIS — K86.2 PANCREAS CYST: ICD-10-CM

## 2024-12-19 DIAGNOSIS — G47.62 NOCTURNAL LEG CRAMPS: ICD-10-CM

## 2024-12-19 DIAGNOSIS — E03.9 HYPOTHYROIDISM, UNSPECIFIED TYPE: ICD-10-CM

## 2024-12-19 LAB
CK SERPL-CCNC: 109 U/L (ref 20–180)
T3FREE SERPL-MCNC: 2.9 PG/ML (ref 2.3–4.2)
T4 FREE SERPL-MCNC: 1.08 NG/DL (ref 0.71–1.51)
TSH SERPL DL<=0.005 MIU/L-ACNC: 1.73 UIU/ML (ref 0.4–4)
VIT B12 SERPL-MCNC: 975 PG/ML (ref 210–950)

## 2024-12-19 PROCEDURE — 99999 PR PBB SHADOW E&M-EST. PATIENT-LVL V: CPT | Mod: PBBFAC,,, | Performed by: INTERNAL MEDICINE

## 2024-12-19 PROCEDURE — 82550 ASSAY OF CK (CPK): CPT | Performed by: INTERNAL MEDICINE

## 2024-12-19 PROCEDURE — 36415 COLL VENOUS BLD VENIPUNCTURE: CPT | Performed by: INTERNAL MEDICINE

## 2024-12-19 PROCEDURE — 82607 VITAMIN B-12: CPT | Mod: GA | Performed by: INTERNAL MEDICINE

## 2024-12-19 PROCEDURE — 84443 ASSAY THYROID STIM HORMONE: CPT | Performed by: INTERNAL MEDICINE

## 2024-12-19 PROCEDURE — 84439 ASSAY OF FREE THYROXINE: CPT | Performed by: INTERNAL MEDICINE

## 2024-12-19 PROCEDURE — 84481 FREE ASSAY (FT-3): CPT | Performed by: INTERNAL MEDICINE

## 2024-12-19 PROCEDURE — 99214 OFFICE O/P EST MOD 30 MIN: CPT | Mod: S$PBB,,, | Performed by: INTERNAL MEDICINE

## 2024-12-19 PROCEDURE — 99215 OFFICE O/P EST HI 40 MIN: CPT | Mod: PBBFAC | Performed by: INTERNAL MEDICINE

## 2024-12-19 RX ORDER — GABAPENTIN 100 MG/1
100 CAPSULE ORAL NIGHTLY
Qty: 30 CAPSULE | Refills: 0 | Status: SHIPPED | OUTPATIENT
Start: 2024-12-19 | End: 2025-01-18

## 2024-12-19 NOTE — PROGRESS NOTES
MEDICAL HISTORY:  Hypothyroid disease.  Pre diabetes  Fatty liver with focal nodular hyperplasia.  Anxiety disorder.  Cholecystectomy with pancreatitis.   x3.  Abdominal hernia repair.  Diverticula disease of the colon, for which she had in , a sigmoid colectomy due to diverticulitis, then a stent put in a month later because of microperforation. In 2015, she underwent a balloon dilatation due to stenosis of the colonic anastomosis        Oncology History Overview Note   Diverticulitis   1. 2014: Surgery - Lap segmental resection of sigmoid cancer  2. Complicated by anastomotic leak and subsequent stricture managed conservatively with IR drain and repeat balloon dilations  3. 12/7/15: Robo lap exploration, laparoscopic hand-assisted redo low anterior resection, hand assisted omental advancement flap, cysto and stents     SOCIAL HISTORY:  Tobacco use and alcohol use -- none.       Medications  Betahistine 8 mg t.i.d.   Methocarbamol 500 mg q.h.s.  Xanax 0.5 mg 3 times daily   cytomel 25 mcg half a every other day   Levothyroxine 0.088 mg daily   Flonase 2 puffs daily   Claritin once a day  Over-the-counter Pepcid as needed      64-year-old female     One reason for the visit is to follow-up on the thyroid status.  From labs back in November Cytomel was decreased to half a tablet every day to every other day.  Because of high T3.    She has followed up with neurosurgeon it [facility].  Regarding leg cramps at night.  She also has low back pain that times will extend to either the right or left leg.  In his MRI showed various degrees of neuroforaminal narrowing.  That has no specific nerve root compression.  No reported spinal stenosis.  Facet arthropathy L4-5.  That has discussion about going on gabapentin but she wants to discuss this with the psychiatrist.    In regard to the leg cramps at night the use of methocarbamol 500 mg q.h.s. that has done well she is not having a tight feeling in  his upper thighs in taking this.    In regard to Meniere's disease she does have diminished hearing both ears but bit more in his left ear.  She has a appointment with a different ENT specialist tomorrow she will also follow-up with a in the ENT specialists at [facility].  With the going to do various types of balance and brainstem testing.  She is on betahistine 8 mg t.i.d. for which she thinks that has helped.  Again she attempted 1 dose of Dyazide with the prednisone and did not respond well to this in his sense of having leg cramps and leg pain    Also at [facility] in his she had an MRI CP the follow-up on abnormal CT of the abdomen suggested in his adrenal adenoma of the left adrenal in bile duct dilatation.  The MRI MRCP did show evidence hepatic cyst which was little bit more compared to imaging 2014 there was mentioned in his pancreatic cyst for which they recommend repeating in a year's time.  The common bile duct was dilated suspect of the due to.  That has cholecystectomy.  In his it was a fullness in his left adrenal that could be an adenoma.  She had hormone testing that did not show any excess hormones a cortisol testosterone estrogen    Finally on the testing 1 liver enzymes minimally elevated there was evidence for hepatic steatosis.  Apparently there will be a follow-up with the hepatologist at [facility] regarding the hepatic cyst the pancreatic cyst in will prior address fatty liver    Examination   Weight 199   BMI 32.13  Pulse 88   Blood pressure 1 28/72   1+ knee absent ankle jerk reflexes   Chest clear breath sounds  Heart regular rate rhythm    Impression   Hypothyroid   Pancreatic 6   Nocturnal leg cramps   Meniere's disease   Lumbar degenerative disc disease with radiculopathy    K Plan  Today free T3 free T4 TSH, in addition with CPK which was elevated in repeat a B12 because it was elevated in his she has cut back on the B12 supplementation  Stretching exercises legs discussed and  consider referral to physical therapy    Examination

## 2024-12-24 RX ORDER — LIOTHYRONINE SODIUM 25 UG/1
TABLET ORAL
Qty: 45 TABLET | Refills: 3 | Status: SHIPPED | OUTPATIENT
Start: 2024-12-24

## 2024-12-24 NOTE — TELEPHONE ENCOUNTER
No care due was identified.  [facility] Embedded Care Due Messages. Reference number: [id number].   12/24/2024 5:24:38 AM CST

## 2024-12-24 NOTE — TELEPHONE ENCOUNTER
Refill Decision Note   Abiola Borjas  is requesting a refill authorization.  Brief Assessment and Rationale for Refill:  Approve     Medication Therapy Plan:         Alert overridden per protocol: Yes   Comments:     Note composed:9:16 AM 12/24/2024

## 2025-01-16 ENCOUNTER — TELEPHONE (OUTPATIENT)
Dept: HEPATOLOGY | Facility: CLINIC | Age: 65
End: 2025-01-16
Payer: MEDICARE

## 2025-01-16 DIAGNOSIS — Z78.0 MENOPAUSE: ICD-10-CM

## 2025-01-28 ENCOUNTER — OFFICE VISIT (OUTPATIENT)
Dept: INTERNAL MEDICINE | Facility: CLINIC | Age: 65
End: 2025-01-28
Payer: MEDICARE

## 2025-01-28 VITALS
TEMPERATURE: 100 F | WEIGHT: 191.38 LBS | DIASTOLIC BLOOD PRESSURE: 70 MMHG | HEART RATE: 88 BPM | BODY MASS INDEX: 30.76 KG/M2 | OXYGEN SATURATION: 98 % | HEIGHT: 66 IN | SYSTOLIC BLOOD PRESSURE: 120 MMHG

## 2025-01-28 DIAGNOSIS — U07.1 COVID-19 VIRUS INFECTION: Primary | ICD-10-CM

## 2025-01-28 DIAGNOSIS — U07.1 COVID-19 VIRUS DETECTED: ICD-10-CM

## 2025-01-28 LAB
CTP QC/QA: YES
CTP QC/QA: YES
FLUAV AG NPH QL: NEGATIVE
FLUBV AG NPH QL: NEGATIVE
SARS-COV-2 RDRP RESP QL NAA+PROBE: POSITIVE

## 2025-01-28 PROCEDURE — 87635 SARS-COV-2 COVID-19 AMP PRB: CPT | Mod: PBBFAC | Performed by: INTERNAL MEDICINE

## 2025-01-28 PROCEDURE — 87502 INFLUENZA DNA AMP PROBE: CPT | Mod: PBBFAC | Performed by: INTERNAL MEDICINE

## 2025-01-28 PROCEDURE — 99999PBSHW: Mod: PBBFAC,,,

## 2025-01-28 PROCEDURE — 99215 OFFICE O/P EST HI 40 MIN: CPT | Mod: PBBFAC | Performed by: INTERNAL MEDICINE

## 2025-01-28 PROCEDURE — 99999 PR PBB SHADOW E&M-EST. PATIENT-LVL V: CPT | Mod: PBBFAC,,, | Performed by: INTERNAL MEDICINE

## 2025-01-28 PROCEDURE — 99999PBSHW POCT INFLUENZA A/B: Mod: 59,PBBFAC,,

## 2025-01-28 PROCEDURE — 99213 OFFICE O/P EST LOW 20 MIN: CPT | Mod: S$PBB,,, | Performed by: INTERNAL MEDICINE

## 2025-01-28 RX ORDER — PROMETHAZINE HYDROCHLORIDE AND DEXTROMETHORPHAN HYDROBROMIDE 6.25; 15 MG/5ML; MG/5ML
5 SYRUP ORAL EVERY 4 HOURS PRN
Qty: 240 ML | Refills: 0 | Status: SHIPPED | OUTPATIENT
Start: 2025-01-28 | End: 2025-02-07

## 2025-01-28 RX ORDER — NIRMATRELVIR AND RITONAVIR 300-100 MG
KIT ORAL
Qty: 30 TABLET | Refills: 0 | Status: SHIPPED | OUTPATIENT
Start: 2025-01-28

## 2025-01-28 NOTE — PROGRESS NOTES
Provider used 2 pt identifiers and reviewed allergies prior to giving injection, VIS given then asked pt to wait 15 mins post injection for s/sx of adverse reactions.      Reported to provider covid test was positive and flu test was negative.

## 2025-01-28 NOTE — PROGRESS NOTES
Subjective:       Patient ID: Abiola Borjas is a 64 y.o. female.    Chief Complaint: Cough (X2 days )      Abiola Borjas is 64 y.o. female who presents for sinus congestion and cough intermittent production white sputum X 2 days.  Pt is taking mucinex by mouth 2x/day.  Pt had zpac at home and has taken 2 days worth.      Review of Systems   Constitutional:  Positive for fever. Negative for chills.   HENT:  Positive for postnasal drip, rhinorrhea and sinus pressure/congestion. Negative for sore throat.    Respiratory:  Positive for cough. Negative for shortness of breath.    Gastrointestinal:  Negative for abdominal pain, constipation, diarrhea, nausea and vomiting.   Musculoskeletal:  Positive for myalgias.         Objective:      Physical Exam  Vitals reviewed.   Constitutional:       General: She is awake.      Appearance: Normal appearance.   HENT:      Head: Normocephalic and atraumatic.      Nose: Septal deviation present.      Right Turbinates: Swollen.      Left Turbinates: Swollen.      Right Sinus: No maxillary sinus tenderness or frontal sinus tenderness.      Left Sinus: No maxillary sinus tenderness or frontal sinus tenderness.      Mouth/Throat:      Mouth: Mucous membranes are moist.      Pharynx: Oropharynx is clear.   Eyes:      Extraocular Movements: Extraocular movements intact.      Conjunctiva/sclera: Conjunctivae normal.      Pupils: Pupils are equal, round, and reactive to light.   Cardiovascular:      Rate and Rhythm: Normal rate and regular rhythm.      Heart sounds: No murmur heard.     No friction rub. No gallop.   Pulmonary:      Effort: Pulmonary effort is normal.      Breath sounds: Normal breath sounds.   Abdominal:      General: Bowel sounds are normal. There is no distension.      Tenderness: There is no abdominal tenderness. There is no guarding or rebound.   Musculoskeletal:      Right lower leg: No edema.      Left lower leg: No edema.   Lymphadenopathy:      Cervical: No  cervical adenopathy.   Neurological:      Mental Status: She is alert and oriented to person, place, and time.   Psychiatric:         Mood and Affect: Mood normal.         Behavior: Behavior is cooperative.         Assessment:       1. COVID-19 virus infection        Plan:     Pt with URI with cough and congestion with positive COVID 19 rapid screening test.  Flu testing negative.  Pt counseled about pros vs cons of paxlovid and pt has elected for treatment.  Will tx with Paxlovid BID X 5 days.  Take Mucinex by mouth 2x/day for 7 to 10 days.  Take acetaminophen 1 gram by mouth 3x/day as needed for fever, muscle pain and headache.  Drink plenty of fluids and rest.  Pt counseled to self-isolate for 5 days and that she could stop isolating if asymptomatic at 5 days.  Pt counseled to wear N-95 when she had to be around other people.  Patient was advised to follow up if symptom are not improving or worsened..    Abiola was seen today for cough.    Diagnoses and all orders for this visit:    COVID-19 virus infection  -     POCT COVID-19 Rapid Screening  -     POCT Influenza A/B Rapid Antigen  -     promethazine-dextromethorphan (PROMETHAZINE-DM) 6.25-15 mg/5 mL Syrp; Take 5 mLs by mouth every 4 (four) hours as needed (cough).  -     nirmatrelvir-ritonavir (PAXLOVID) 300 mg (150 mg x 2)-100 mg copackaged tablets (EUA); Take 3 tablets by mouth 2 (two) times daily. Each dose contains 2 nirmatrelvir (pink tablets) and 1 ritonavir (white tablet). Take all 3 tablets together

## 2025-01-31 ENCOUNTER — TELEPHONE (OUTPATIENT)
Dept: INTERNAL MEDICINE | Facility: CLINIC | Age: 65
End: 2025-01-31
Payer: MEDICARE

## 2025-01-31 NOTE — TELEPHONE ENCOUNTER
----- Message from Janay sent at 1/31/2025  3:30 PM CST -----  Contact: [phone] Patient  Patient is returning a phone call.    Who left a message for the patient: Dominguez Higgins MA    Does patient know what this is regarding:      Would you like a call back, or a response through your [facility] portal?:   Call Back Please. Thank you.     Comments:

## 2025-01-31 NOTE — TELEPHONE ENCOUNTER
Spoke with patient and she wants be seen next week, was tested and positive for Covid on 1/28. She states she just would like a check up and to get her lungs checked as well as discuss medications. Should I schedule her for next week she has an appointment for 2/14. Patient would also like to try a medication called      ''ivermectin''

## 2025-01-31 NOTE — TELEPHONE ENCOUNTER
I believe she requested a follow-up after being diagnosed with COVID.  I can see her Thursday February 6 1:30 p.m.    If this is okay let me know I can put in the appointment

## 2025-01-31 NOTE — TELEPHONE ENCOUNTER
----- Message from Janay sent at 1/31/2025  3:30 PM CST -----  Contact: [phone] Patient  Patient is returning a phone call.    Who left a message for the patient: Dominguez Higgins MA    Does patient know what this is regarding:      Would you like a call back, or a response through your [facility] portal?:   Call Back Please. Thank you.     Comments:

## 2025-01-31 NOTE — TELEPHONE ENCOUNTER
----- Message from Yamilka sent at 1/31/2025 11:52 AM CST -----  Contact: Patient   [phone]  .[id number]     change to earlier appt    Patient is calling for Medical Advice regarding: Covid Positive- wants to come in for a check next week- reserved 2/14 but wants NEXT WEEK w PCP    How long has patient had these symptoms:    Pharmacy name and phone#:    Patient wants a call back or thru [facility]:    [phone]   [place]    Comments: [phone]    Please advise patient replies from provider may take up to 48 hours.

## 2025-02-02 ENCOUNTER — OCHSNER VIRTUAL EMERGENCY DEPARTMENT (OUTPATIENT)
Facility: CLINIC | Age: 65
End: 2025-02-02
Payer: MEDICARE

## 2025-02-02 ENCOUNTER — NURSE TRIAGE (OUTPATIENT)
Dept: ADMINISTRATIVE | Facility: CLINIC | Age: 65
End: 2025-02-02
Payer: MEDICARE

## 2025-02-02 NOTE — TELEPHONE ENCOUNTER
Pt reports she was dx with covid on 1/28/25. Pt reports nausea and diarrhea that began yesterday. Denies emesis, SOB, chest pain, weakness. Care advice to call PCP within 24 hours. Secure chat sent to Dee Dee Cox MD verbalized recommend oral fluid intake, zofran q4h or phenergan q6h twice as needed, call PCP tomorrow. ER for black or bloody stools, SOB, decreased urination. Pt made aware and verbalizes understanding.   Reason for Disposition   [1] HIGH RISK patient (e.g., weak immune system, age > 64 years, obesity with BMI 30 or higher, pregnant, chronic lung disease) AND [2] COVID symptoms (e.g., cough, fever)  (Exceptions: Already seen by PCP and no new or worsening symptoms.)    Additional Information   Negative: SEVERE difficulty breathing (e.g., struggling for each breath, speaks in single words)   Negative: Difficult to awaken or acting confused (e.g., disoriented, slurred speech)   Negative: Bluish (or gray) lips or face now   Negative: Shock suspected (e.g., cold/pale/clammy skin, too weak to stand, low BP, rapid pulse)   Negative: Sounds like a life-threatening emergency to the triager   Negative: SEVERE or constant chest pain or pressure  (Exception: Mild central chest pain, present only when coughing.)   Negative: MODERATE difficulty breathing (e.g., speaks in phrases, SOB even at rest, pulse 100-120)   Negative: [1] Headache AND [2] stiff neck (can't touch chin to chest)   Negative: Oxygen level (e.g., pulse oximetry) 90% or lower   Negative: Chest pain or pressure  (Exception: MILD central chest pain, present only when coughing.)   Negative: [1] Drinking very little AND [2] dehydration suspected (e.g., no urine > 12 hours, very dry mouth, very lightheaded)   Negative: Patient sounds very sick or weak to the triager   Negative: MILD difficulty breathing (e.g., minimal/no SOB at rest, SOB with walking, pulse <100)   Negative: Fever > 103 F (39.4 C)   Negative: [1] Fever > 101 F (38.3 C) AND [2] age >  60 years   Negative: Oxygen level (e.g., pulse oximetry) 91 to 94%    Protocols used: COVID-19 - Diagnosed or [id number]

## 2025-02-02 NOTE — PLAN OF CARE-OVED
[facility] Emergency Department Plan of Care Note    Referral source: Nurse On-Call      Reason for consult: Diarrhea      Additional History: recently diagnosed with covid, developed diarrhea, nausea. No melena or hematochezia, continues to urinate, no CP or SOB      Disposition recommended: Treat in place    Oral fluids, antiemetic as needed, Call pcp tomorrow. ER for black/bloody stools, decreased urination, SOB

## 2025-02-03 NOTE — TELEPHONE ENCOUNTER
Called patient to get her in for an appointment today, received no answer, left message to call office if she wants the appointment or not.

## 2025-02-06 ENCOUNTER — OFFICE VISIT (OUTPATIENT)
Dept: INTERNAL MEDICINE | Facility: CLINIC | Age: 65
End: 2025-02-06
Payer: MEDICARE

## 2025-02-06 VITALS
BODY MASS INDEX: 30.82 KG/M2 | WEIGHT: 191.81 LBS | OXYGEN SATURATION: 97 % | HEART RATE: 88 BPM | DIASTOLIC BLOOD PRESSURE: 70 MMHG | SYSTOLIC BLOOD PRESSURE: 124 MMHG | HEIGHT: 66 IN

## 2025-02-06 DIAGNOSIS — J06.9 VIRAL URI WITH COUGH: Primary | ICD-10-CM

## 2025-02-06 PROCEDURE — 99214 OFFICE O/P EST MOD 30 MIN: CPT | Mod: S$PBB,,, | Performed by: INTERNAL MEDICINE

## 2025-02-06 PROCEDURE — 99999 PR PBB SHADOW E&M-EST. PATIENT-LVL III: CPT | Mod: PBBFAC,,, | Performed by: INTERNAL MEDICINE

## 2025-02-06 PROCEDURE — 99213 OFFICE O/P EST LOW 20 MIN: CPT | Mod: PBBFAC | Performed by: INTERNAL MEDICINE

## 2025-02-06 RX ORDER — METHOCARBAMOL 500 MG/1
500 TABLET, FILM COATED ORAL NIGHTLY
Qty: 30 TABLET | Refills: 5 | Status: SHIPPED | OUTPATIENT
Start: 2025-02-06

## 2025-02-06 RX ORDER — AZELASTINE 1 MG/ML
2 SPRAY, METERED NASAL 2 TIMES DAILY
Qty: 30 ML | Refills: 0 | Status: SHIPPED | OUTPATIENT
Start: 2025-02-06

## 2025-02-06 RX ORDER — METHOCARBAMOL 500 MG/1
500 TABLET, FILM COATED ORAL NIGHTLY
Qty: 30 TABLET | Refills: 5 | Status: SHIPPED | OUTPATIENT
Start: 2025-02-06 | End: 2025-02-06

## 2025-02-06 RX ORDER — AZELASTINE 1 MG/ML
2 SPRAY, METERED NASAL 2 TIMES DAILY
Qty: 30 ML | Refills: 0 | Status: SHIPPED | OUTPATIENT
Start: 2025-02-06 | End: 2025-02-06

## 2025-02-06 RX ORDER — ONDANSETRON 4 MG/1
4 TABLET, ORALLY DISINTEGRATING ORAL EVERY 6 HOURS PRN
COMMUNITY
Start: 2024-12-28

## 2025-02-21 DIAGNOSIS — Z00.00 ENCOUNTER FOR MEDICARE ANNUAL WELLNESS EXAM: ICD-10-CM

## 2025-02-25 ENCOUNTER — OFFICE VISIT (OUTPATIENT)
Dept: INTERNAL MEDICINE | Facility: CLINIC | Age: 65
End: 2025-02-25
Payer: MEDICARE

## 2025-02-25 VITALS
BODY MASS INDEX: 31.67 KG/M2 | SYSTOLIC BLOOD PRESSURE: 124 MMHG | HEART RATE: 76 BPM | WEIGHT: 197.06 LBS | OXYGEN SATURATION: 99 % | DIASTOLIC BLOOD PRESSURE: 78 MMHG | TEMPERATURE: 98 F | HEIGHT: 66 IN

## 2025-02-25 DIAGNOSIS — R30.0 DYSURIA: Primary | ICD-10-CM

## 2025-02-25 DIAGNOSIS — J02.9 PHARYNGITIS, UNSPECIFIED ETIOLOGY: ICD-10-CM

## 2025-02-25 LAB
BILIRUB SERPL-MCNC: NEGATIVE MG/DL
BLOOD URINE, POC: NORMAL
CLARITY, POC UA: NORMAL
COLOR, POC UA: NORMAL
CTP QC/QA: YES
GLUCOSE UR QL STRIP: NEGATIVE
KETONES UR QL STRIP: NEGATIVE
LEUKOCYTE ESTERASE URINE, POC: NEGATIVE
MOLECULAR STREP A: NEGATIVE
NITRITE, POC UA: NEGATIVE
PH, POC UA: 6
POC MOLECULAR INFLUENZA A AGN: NEGATIVE
POC MOLECULAR INFLUENZA B AGN: NEGATIVE
PROTEIN, POC: NEGATIVE
SARS-COV-2 RDRP RESP QL NAA+PROBE: NEGATIVE
SPECIFIC GRAVITY, POC UA: 1.02
UROBILINOGEN, POC UA: NORMAL

## 2025-02-25 PROCEDURE — 99999PBSHW: Mod: PBBFAC,,,

## 2025-02-25 PROCEDURE — 99999PBSHW POCT URINE DIPSTICK WITHOUT MICROSCOPE: Mod: PBBFAC,,,

## 2025-02-25 PROCEDURE — 87651 STREP A DNA AMP PROBE: CPT | Mod: PBBFAC | Performed by: INTERNAL MEDICINE

## 2025-02-25 PROCEDURE — 81002 URINALYSIS NONAUTO W/O SCOPE: CPT | Mod: PBBFAC | Performed by: INTERNAL MEDICINE

## 2025-02-25 PROCEDURE — 99999 PR PBB SHADOW E&M-EST. PATIENT-LVL V: CPT | Mod: PBBFAC,,, | Performed by: INTERNAL MEDICINE

## 2025-02-25 PROCEDURE — 81003 URINALYSIS AUTO W/O SCOPE: CPT | Performed by: INTERNAL MEDICINE

## 2025-02-25 PROCEDURE — 99214 OFFICE O/P EST MOD 30 MIN: CPT | Mod: S$PBB,,, | Performed by: INTERNAL MEDICINE

## 2025-02-25 PROCEDURE — 99999PBSHW POCT STREP A MOLECULAR: Mod: PBBFAC,,,

## 2025-02-25 PROCEDURE — 99999PBSHW POCT INFLUENZA A/B MOLECULAR: Mod: PBBFAC,,,

## 2025-02-25 PROCEDURE — 87502 INFLUENZA DNA AMP PROBE: CPT | Mod: PBBFAC | Performed by: INTERNAL MEDICINE

## 2025-02-25 PROCEDURE — 87635 SARS-COV-2 COVID-19 AMP PRB: CPT | Mod: PBBFAC | Performed by: INTERNAL MEDICINE

## 2025-02-25 PROCEDURE — 99215 OFFICE O/P EST HI 40 MIN: CPT | Mod: PBBFAC | Performed by: INTERNAL MEDICINE

## 2025-02-25 RX ORDER — AZITHROMYCIN 250 MG/1
TABLET, FILM COATED ORAL
Qty: 6 TABLET | Refills: 0 | Status: SHIPPED | OUTPATIENT
Start: 2025-02-25

## 2025-02-25 NOTE — PROGRESS NOTES
EDICAL HISTORY:  Hypothyroid disease.  Pre diabetes  Fatty liver with focal nodular hyperplasia.  Anxiety disorder.  Cholecystectomy with pancreatitis.   x3.  Abdominal hernia repair.  Diverticula disease of the colon, for which she had in , a sigmoid colectomy due to diverticulitis, then a stent put in a month later because of microperforation. In 2015, she underwent a balloon dilatation due to stenosis of the colonic anastomosis        Oncology History Overview Note   Diverticulitis   1. 2014: Surgery - Lap segmental resection of sigmoid cancer  2. Complicated by anastomotic leak and subsequent stricture managed conservatively with IR drain and repeat balloon dilations  3. 12/7/15: Robo lap exploration, laparoscopic hand-assisted redo low anterior resection, hand assisted omental advancement flap, cysto and stents     SOCIAL HISTORY:  Tobacco use and alcohol use -- none.       Medications  Xanax 0.5 mg 3 times daily   cytomel 25 mcg half a tablet daily   Levothyroxine 0.088 mg daily   Flonase 2 puffs daily   Claritin once a day  Over-the-counter Pepcid as needed     Methocarbamol 500 mg q.h.s.   Gabapentin 100 mg b.i.d.        64-year-old female   Reason for the visit is in his 3 days goes she was that has feeling weak in his that has feeling bad in general.  Since yesterday she has been having a sore throat and discomfort on right submandibular area.  She has not had fever.  She is congested.  Not blowing out any mucus.  Occasional cough.  No shortness a breath or chest congestion.    Also today had a bout of dysuria    Examination   Weight 197   BMI 31.81  Temperature  98Pulse ox 98%  Blood pressure 130/72  Tympanic membranes normal   Nasal mucosa is clear  Oropharynx that has hyperemic  Neck tender submandibular lymph gland   Chest clear breath sounds  Heart regular rate and rhythm    Urine dip negative for leukocytes red blood cells  Throat swab for strep negative  Nasal swab for COVID  negative    Impression   Pharyngitis   Dysuria    Plan   Urinalysis reflex urine culture

## 2025-02-26 LAB
BILIRUB UR QL STRIP: NEGATIVE
CLARITY UR REFRACT.AUTO: CLEAR
COLOR UR AUTO: COLORLESS
GLUCOSE UR QL STRIP: NEGATIVE
HGB UR QL STRIP: NEGATIVE
KETONES UR QL STRIP: NEGATIVE
LEUKOCYTE ESTERASE UR QL STRIP: NEGATIVE
NITRITE UR QL STRIP: NEGATIVE
PH UR STRIP: 6 [PH] (ref 5–8)
PROT UR QL STRIP: NEGATIVE
SP GR UR STRIP: 1 (ref 1–1.03)
URN SPEC COLLECT METH UR: ABNORMAL

## 2025-02-28 ENCOUNTER — OFFICE VISIT (OUTPATIENT)
Dept: INTERNAL MEDICINE | Facility: CLINIC | Age: 65
End: 2025-02-28
Payer: MEDICARE

## 2025-02-28 ENCOUNTER — LAB VISIT (OUTPATIENT)
Dept: LAB | Facility: HOSPITAL | Age: 65
End: 2025-02-28
Attending: INTERNAL MEDICINE
Payer: MEDICARE

## 2025-02-28 VITALS
DIASTOLIC BLOOD PRESSURE: 80 MMHG | BODY MASS INDEX: 30.86 KG/M2 | HEART RATE: 82 BPM | WEIGHT: 192 LBS | HEIGHT: 66 IN | OXYGEN SATURATION: 96 % | SYSTOLIC BLOOD PRESSURE: 120 MMHG

## 2025-02-28 DIAGNOSIS — A08.4 VIRAL GASTROENTERITIS: ICD-10-CM

## 2025-02-28 DIAGNOSIS — B34.9 VIRAL SYNDROME: ICD-10-CM

## 2025-02-28 DIAGNOSIS — E03.9 HYPOTHYROIDISM, UNSPECIFIED TYPE: ICD-10-CM

## 2025-02-28 DIAGNOSIS — B34.9 VIRAL SYNDROME: Primary | ICD-10-CM

## 2025-02-28 LAB
ALBUMIN SERPL BCP-MCNC: 4.1 G/DL (ref 3.5–5.2)
ALP SERPL-CCNC: 95 U/L (ref 40–150)
ALT SERPL W/O P-5'-P-CCNC: 33 U/L (ref 10–44)
ANION GAP SERPL CALC-SCNC: 10 MMOL/L (ref 8–16)
AST SERPL-CCNC: 29 U/L (ref 10–40)
BILIRUB SERPL-MCNC: 0.3 MG/DL (ref 0.1–1)
BUN SERPL-MCNC: 11 MG/DL (ref 8–23)
CALCIUM SERPL-MCNC: 9.6 MG/DL (ref 8.7–10.5)
CHLORIDE SERPL-SCNC: 104 MMOL/L (ref 95–110)
CO2 SERPL-SCNC: 24 MMOL/L (ref 23–29)
CREAT SERPL-MCNC: 0.8 MG/DL (ref 0.5–1.4)
CTP QC/QA: YES
CTP QC/QA: YES
EST. GFR  (NO RACE VARIABLE): >60 ML/MIN/1.73 M^2
GLUCOSE SERPL-MCNC: 103 MG/DL (ref 70–110)
HETEROPH AB SERPL QL IA: NEGATIVE
POC MOLECULAR INFLUENZA A AGN: NEGATIVE
POC MOLECULAR INFLUENZA B AGN: NEGATIVE
POTASSIUM SERPL-SCNC: 4.3 MMOL/L (ref 3.5–5.1)
PROT SERPL-MCNC: 7.9 G/DL (ref 6–8.4)
SARS-COV-2 RDRP RESP QL NAA+PROBE: NEGATIVE
SODIUM SERPL-SCNC: 138 MMOL/L (ref 136–145)
T3FREE SERPL-MCNC: 3.4 PG/ML (ref 2.3–4.2)
TSH SERPL DL<=0.005 MIU/L-ACNC: 2.93 UIU/ML (ref 0.4–4)

## 2025-02-28 PROCEDURE — 84443 ASSAY THYROID STIM HORMONE: CPT | Performed by: INTERNAL MEDICINE

## 2025-02-28 PROCEDURE — 99999 PR PBB SHADOW E&M-EST. PATIENT-LVL IV: CPT | Mod: PBBFAC,,, | Performed by: INTERNAL MEDICINE

## 2025-02-28 PROCEDURE — 36415 COLL VENOUS BLD VENIPUNCTURE: CPT | Performed by: INTERNAL MEDICINE

## 2025-02-28 PROCEDURE — 86308 HETEROPHILE ANTIBODY SCREEN: CPT | Performed by: INTERNAL MEDICINE

## 2025-02-28 PROCEDURE — 99214 OFFICE O/P EST MOD 30 MIN: CPT | Mod: PBBFAC | Performed by: INTERNAL MEDICINE

## 2025-02-28 PROCEDURE — 84481 FREE ASSAY (FT-3): CPT | Performed by: INTERNAL MEDICINE

## 2025-02-28 PROCEDURE — 85025 COMPLETE CBC W/AUTO DIFF WBC: CPT | Performed by: INTERNAL MEDICINE

## 2025-02-28 PROCEDURE — 80053 COMPREHEN METABOLIC PANEL: CPT | Performed by: INTERNAL MEDICINE

## 2025-02-28 NOTE — PROGRESS NOTES
EDICAL HISTORY:  Hypothyroid disease.  Pre diabetes  Fatty liver with focal nodular hyperplasia.  Anxiety disorder.  Cholecystectomy with pancreatitis.   x3.  Abdominal hernia repair.  Diverticula disease of the colon, for which she had in , a sigmoid colectomy due to diverticulitis, then a stent put in a month later because of microperforation. In 2015, she underwent a balloon dilatation due to stenosis of the colonic anastomosis        Oncology History Overview Note   Diverticulitis   1. 2014: Surgery - Lap segmental resection of sigmoid cancer  2. Complicated by anastomotic leak and subsequent stricture managed conservatively with IR drain and repeat balloon dilations  3. 12/7/15: Robo lap exploration, laparoscopic hand-assisted redo low anterior resection, hand assisted omental advancement flap, cysto and stents     SOCIAL HISTORY:  Tobacco use and alcohol use -- none.       Medications  Xanax 0.5 mg 3 times daily   cytomel 25 mcg half a tablet daily   Levothyroxine 0.088 mg daily   Flonase 2 puffs daily   Claritin once a day  Over-the-counter Pepcid as needed     Methocarbamol 500 mg q.h.s.   Gabapentin 100 mg b.i.d.      64-year-old female   Presents with not feeling well.  She was seen .  For sore throat ear pain.  She tested negative for strep flu flu COVID.  Was given a Z-Rito that has to have on hand because she was going out of town.    Since that time she has been feeling nauseated.  Has a headache.  Not much nasal congestion or cough.  That has sore throat is improved that has well as ear pain.  She has had a few bouts of watery diarrhea.  She does not feel like she has a fever.    Examination   Weight 192   BMI 30.9 not  Temperature taken by me 98.8  Pulse ox 97%   Tympanic membranes normal   Nasal mucosa is clear   Oropharynx minimally hyperemic   Neck no palpable adenopathy in the neck   Chest clear breath sounds   Heart regular rate and rhythm  Abdominal exam is  bowel sounds soft nontender    Nasal swab for influenza COVID negative    Impression   Viral syndrome   Viral gastroenteritis  Hypothyroid    Plan   CBC chemistry TSH free T4  She that has Zofran use as needed for nausea  Use of Pepto-Bismol use of Imodium AD as needed  Proper hydration   Rafaela

## 2025-03-01 ENCOUNTER — PATIENT MESSAGE (OUTPATIENT)
Dept: INTERNAL MEDICINE | Facility: CLINIC | Age: 65
End: 2025-03-01
Payer: MEDICARE

## 2025-03-01 LAB
BASOPHILS # BLD AUTO: 0.03 K/UL (ref 0–0.2)
BASOPHILS NFR BLD: 0.3 % (ref 0–1.9)
DIFFERENTIAL METHOD BLD: ABNORMAL
EOSINOPHIL # BLD AUTO: 0.1 K/UL (ref 0–0.5)
EOSINOPHIL NFR BLD: 0.8 % (ref 0–8)
ERYTHROCYTE [DISTWIDTH] IN BLOOD BY AUTOMATED COUNT: 13.2 % (ref 11.5–14.5)
HCT VFR BLD AUTO: 44 % (ref 37–48.5)
HGB BLD-MCNC: 14 G/DL (ref 12–16)
IMM GRANULOCYTES # BLD AUTO: 0.02 K/UL (ref 0–0.04)
IMM GRANULOCYTES NFR BLD AUTO: 0.2 % (ref 0–0.5)
LYMPHOCYTES # BLD AUTO: 2.5 K/UL (ref 1–4.8)
LYMPHOCYTES NFR BLD: 27.3 % (ref 18–48)
MCH RBC QN AUTO: 28.2 PG (ref 27–31)
MCHC RBC AUTO-ENTMCNC: 31.8 G/DL (ref 32–36)
MCV RBC AUTO: 89 FL (ref 82–98)
MONOCYTES # BLD AUTO: 0.5 K/UL (ref 0.3–1)
MONOCYTES NFR BLD: 5.3 % (ref 4–15)
NEUTROPHILS # BLD AUTO: 5.9 K/UL (ref 1.8–7.7)
NEUTROPHILS NFR BLD: 66.1 % (ref 38–73)
NRBC BLD-RTO: 0 /100 WBC
PLATELET # BLD AUTO: 291 K/UL (ref 150–450)
PMV BLD AUTO: 10.1 FL (ref 9.2–12.9)
RBC # BLD AUTO: 4.97 M/UL (ref 4–5.4)
WBC # BLD AUTO: 8.98 K/UL (ref 3.9–12.7)

## 2025-03-02 ENCOUNTER — RESULTS FOLLOW-UP (OUTPATIENT)
Dept: INTERNAL MEDICINE | Facility: CLINIC | Age: 65
End: 2025-03-02
Payer: MEDICARE

## 2025-03-03 RX ORDER — ONDANSETRON 4 MG/1
4 TABLET, FILM COATED ORAL EVERY 8 HOURS PRN
Qty: 30 TABLET | Refills: 0 | Status: SHIPPED | OUTPATIENT
Start: 2025-03-03

## 2025-03-05 ENCOUNTER — TELEPHONE (OUTPATIENT)
Dept: INTERNAL MEDICINE | Facility: CLINIC | Age: 65
End: 2025-03-05
Payer: MEDICARE

## 2025-03-05 RX ORDER — PANTOPRAZOLE SODIUM 40 MG/1
40 TABLET, DELAYED RELEASE ORAL DAILY
Qty: 14 TABLET | Refills: 0 | Status: SHIPPED | OUTPATIENT
Start: 2025-03-05 | End: 2025-03-19

## 2025-03-06 ENCOUNTER — PATIENT MESSAGE (OUTPATIENT)
Dept: INTERNAL MEDICINE | Facility: CLINIC | Age: 65
End: 2025-03-06

## 2025-03-06 ENCOUNTER — HOSPITAL ENCOUNTER (OUTPATIENT)
Dept: RADIOLOGY | Facility: HOSPITAL | Age: 65
Discharge: HOME | End: 2025-03-06
Attending: INTERNAL MEDICINE
Payer: MEDICARE

## 2025-03-06 ENCOUNTER — OFFICE VISIT (OUTPATIENT)
Dept: INTERNAL MEDICINE | Facility: CLINIC | Age: 65
End: 2025-03-06
Payer: MEDICARE

## 2025-03-06 ENCOUNTER — RESULTS FOLLOW-UP (OUTPATIENT)
Dept: INTERNAL MEDICINE | Facility: CLINIC | Age: 65
End: 2025-03-06

## 2025-03-06 VITALS
DIASTOLIC BLOOD PRESSURE: 80 MMHG | SYSTOLIC BLOOD PRESSURE: 122 MMHG | BODY MASS INDEX: 30.4 KG/M2 | HEART RATE: 96 BPM | HEIGHT: 66 IN | OXYGEN SATURATION: 98 % | WEIGHT: 189.13 LBS

## 2025-03-06 DIAGNOSIS — R51.9 NONINTRACTABLE HEADACHE, UNSPECIFIED CHRONICITY PATTERN, UNSPECIFIED HEADACHE TYPE: ICD-10-CM

## 2025-03-06 DIAGNOSIS — R10.84 GENERALIZED ABDOMINAL PAIN: Primary | ICD-10-CM

## 2025-03-06 DIAGNOSIS — K29.70 GASTRITIS, PRESENCE OF BLEEDING UNSPECIFIED, UNSPECIFIED CHRONICITY, UNSPECIFIED GASTRITIS TYPE: ICD-10-CM

## 2025-03-06 DIAGNOSIS — R11.2 NAUSEA AND VOMITING, UNSPECIFIED VOMITING TYPE: ICD-10-CM

## 2025-03-06 DIAGNOSIS — R10.84 GENERALIZED ABDOMINAL PAIN: ICD-10-CM

## 2025-03-06 PROCEDURE — 74019 RADEX ABDOMEN 2 VIEWS: CPT | Mod: TC

## 2025-03-06 PROCEDURE — 74019 RADEX ABDOMEN 2 VIEWS: CPT | Mod: 26,,, | Performed by: RADIOLOGY

## 2025-03-06 PROCEDURE — 99215 OFFICE O/P EST HI 40 MIN: CPT | Mod: PBBFAC | Performed by: INTERNAL MEDICINE

## 2025-03-06 PROCEDURE — 99214 OFFICE O/P EST MOD 30 MIN: CPT | Mod: S$PBB,,, | Performed by: INTERNAL MEDICINE

## 2025-03-06 PROCEDURE — 99999 PR PBB SHADOW E&M-EST. PATIENT-LVL V: CPT | Mod: PBBFAC,,, | Performed by: INTERNAL MEDICINE

## 2025-03-06 NOTE — PROGRESS NOTES
EDICAL HISTORY:  Hypothyroid disease.  Pre diabetes  Fatty liver with focal nodular hyperplasia.  Anxiety disorder.  Cholecystectomy with pancreatitis.   x3.  Abdominal hernia repair.  Diverticula disease of the colon, for which she had in , a sigmoid colectomy due to diverticulitis, then a stent put in a month later because of microperforation. In 2015, she underwent a balloon dilatation due to stenosis of the colonic anastomosis        Oncology History Overview Note   Diverticulitis   1. 2014: Surgery - Lap segmental resection of sigmoid cancer  2. Complicated by anastomotic leak and subsequent stricture managed conservatively with IR drain and repeat balloon dilations  3. 12/7/15: Robo lap exploration, laparoscopic hand-assisted redo low anterior resection, hand assisted omental advancement flap, cysto and stents     SOCIAL HISTORY:  Tobacco use and alcohol use -- none.       Medications  Xanax 0.5 mg 3 times daily   cytomel 25 mcg half a tablet daily   Levothyroxine 0.088 mg daily   Flonase 2 puffs daily   Claritin once a day  Over-the-counter Pepcid as needed     Methocarbamol 500 mg q.h.s.   Gabapentin 100 mg b.i.d.      64-year-old female   Follow-up visit   She is still not feeling well.    Compared to last week the nausea has improved somewhat but she is now feeling what she has described to be queasy that has well as a burning sensation in his abdomen mainly upper abdomen last night she took an over-the-counter Prilosec that has well as this morning.  Pantoprazole was sent in yesterday but she has not started the medication.  She is belching line passing gas.  She does not have the loose stools or diarrhea that she had before but she is not having a bowel movement every day.  That has suspected that has she had a viral gastroenteritis viral syndrome.  Even if with the saw her last week she was seen a few days prior for pharyngitis.  She is not complaining of a sore throat or fever.   Not much of a cough except when last night.    Her daughter has recently been diagnosed with H pylori    Examination   Weight 189   Pulse 92   Blood pressure 122/80    Chest clear breath sounds   Heart regular rate rhythm   Abdominal exam active bowel sounds soft she describes tenderness when I palpate in the right mid left lower quadrant, in his left up in his epigastric region.    Impression   Diffuse abdominal pain gaseousness.  Suspect that has maybe sequelae from a viral syndrome  Gastritis  Headache    K Plan   Repeat a CBC chemistry along with C-reactive protein also get H pylori IgG in his stool for H pylori.  Abdominal x-ray the depending on test results and how she responds may need to do CT of the abdomen

## 2025-03-06 NOTE — PROGRESS NOTES
Internal medicine note test results reviewed all look fine x-ray revealed no obstruction nevertheless she still have significant abdominal pain that has she that has displayed in the office today.  Because such will arrange CT of the abdomen

## 2025-03-07 ENCOUNTER — PATIENT MESSAGE (OUTPATIENT)
Dept: INTERNAL MEDICINE | Facility: CLINIC | Age: 65
End: 2025-03-07
Payer: MEDICARE

## 2025-03-07 ENCOUNTER — HOSPITAL ENCOUNTER (OUTPATIENT)
Dept: RADIOLOGY | Facility: HOSPITAL | Age: 65
Discharge: HOME | End: 2025-03-07
Attending: INTERNAL MEDICINE
Payer: MEDICARE

## 2025-03-07 DIAGNOSIS — R10.84 GENERALIZED ABDOMINAL PAIN: ICD-10-CM

## 2025-03-07 PROCEDURE — 74177 CT ABD & PELVIS W/CONTRAST: CPT | Mod: TC

## 2025-03-07 PROCEDURE — 74177 CT ABD & PELVIS W/CONTRAST: CPT | Mod: 26,,, | Performed by: RADIOLOGY

## 2025-03-07 PROCEDURE — 25500020 PHARM REV CODE 255: Performed by: INTERNAL MEDICINE

## 2025-03-07 RX ADMIN — IOHEXOL 30 ML: 350 INJECTION, SOLUTION INTRAVENOUS at 09:03

## 2025-03-07 RX ADMIN — IOHEXOL 100 ML: 350 INJECTION, SOLUTION INTRAVENOUS at 10:03

## 2025-03-10 ENCOUNTER — RESULTS FOLLOW-UP (OUTPATIENT)
Dept: INTERNAL MEDICINE | Facility: CLINIC | Age: 65
End: 2025-03-10

## 2025-03-10 RX ORDER — GABAPENTIN 100 MG/1
100 CAPSULE ORAL 2 TIMES DAILY
Qty: 60 CAPSULE | Refills: 1 | Status: SHIPPED | OUTPATIENT
Start: 2025-03-10

## 2025-03-12 ENCOUNTER — PATIENT MESSAGE (OUTPATIENT)
Dept: INTERNAL MEDICINE | Facility: CLINIC | Age: 65
End: 2025-03-12
Payer: MEDICARE

## 2025-03-20 ENCOUNTER — PATIENT MESSAGE (OUTPATIENT)
Dept: ADMINISTRATIVE | Facility: CLINIC | Age: 65
End: 2025-03-20
Payer: MEDICARE

## 2025-03-27 ENCOUNTER — TELEPHONE (OUTPATIENT)
Dept: ADMINISTRATIVE | Facility: CLINIC | Age: 65
End: 2025-03-27
Payer: MEDICARE

## 2025-04-07 ENCOUNTER — PATIENT MESSAGE (OUTPATIENT)
Dept: INTERNAL MEDICINE | Facility: CLINIC | Age: 65
End: 2025-04-07
Payer: MEDICARE

## 2025-04-07 DIAGNOSIS — R39.9 SYMPTOMS INVOLVING URINARY SYSTEM: Primary | ICD-10-CM

## 2025-04-07 NOTE — TELEPHONE ENCOUNTER
LOV with César Contreras MD , 3/6/2025  Pt previously sent message today. E-visit from that message not completed.  UA and culture pended, if appropriate.

## 2025-04-08 ENCOUNTER — HOSPITAL ENCOUNTER (OUTPATIENT)
Dept: RADIOLOGY | Facility: HOSPITAL | Age: 65
Discharge: HOME | End: 2025-04-08
Attending: INTERNAL MEDICINE
Payer: MEDICARE

## 2025-04-08 DIAGNOSIS — Z12.31 ENCOUNTER FOR SCREENING MAMMOGRAM FOR BREAST CANCER: ICD-10-CM

## 2025-04-08 PROCEDURE — 77067 SCR MAMMO BI INCL CAD: CPT | Mod: 26,,, | Performed by: RADIOLOGY

## 2025-04-08 PROCEDURE — 77063 BREAST TOMOSYNTHESIS BI: CPT | Mod: 26,,, | Performed by: RADIOLOGY

## 2025-04-08 PROCEDURE — 77063 BREAST TOMOSYNTHESIS BI: CPT | Mod: TC

## 2025-04-10 RX ORDER — LEVOTHYROXINE SODIUM 88 UG/1
88 TABLET ORAL
Qty: 90 TABLET | Refills: 3 | Status: SHIPPED | OUTPATIENT
Start: 2025-04-10

## 2025-04-10 NOTE — TELEPHONE ENCOUNTER
Refill Routing Note   Medication(s) are not appropriate for processing by [facility] for the following reason(s):        Drug-drug interaction    [facility] action(s):  Defer        Medication Therapy Plan: Drug drug: CYTOMEL and  SYNTHROID    Pharmacist review requested: Yes     Appointments  past 12m or future 3m with PCP    Date Provider   Last Visit   3/6/2025 César Contreras MD   Next Visit   Visit date not found César Contreras MD   ED visits in past 90 days: 0        Note composed:8:36 AM 04/10/2025

## 2025-04-10 NOTE — TELEPHONE ENCOUNTER
No care due was identified.  [facility] Embedded Care Due Messages. Reference number: [id number].   4/10/2025 5:21:42 AM CDT

## 2025-04-10 NOTE — TELEPHONE ENCOUNTER
Refill Routing Note   Medication(s) are not appropriate for processing by [facility] for the following reason(s):        Drug-drug interaction  Other    [facility] action(s):  Defer        Medication Therapy Plan: Drug drug: CYTOMEL and  SYNTHROID. Recent increase in TSH    Pharmacist review requested: Yes     Appointments  past 12m or future 3m with PCP    Date Provider   Last Visit   3/6/2025 César Contreras MD   Next Visit   Visit date not found César Contreras MD   ED visits in past 90 days: 0        Note composed:9:59 AM 04/10/2025

## 2025-04-14 ENCOUNTER — RESULTS FOLLOW-UP (OUTPATIENT)
Dept: INTERNAL MEDICINE | Facility: CLINIC | Age: 65
End: 2025-04-14

## 2025-04-14 ENCOUNTER — TELEPHONE (OUTPATIENT)
Dept: INTERNAL MEDICINE | Facility: CLINIC | Age: 65
End: 2025-04-14
Payer: MEDICARE

## 2025-04-14 ENCOUNTER — PATIENT MESSAGE (OUTPATIENT)
Dept: INTERNAL MEDICINE | Facility: CLINIC | Age: 65
End: 2025-04-14
Payer: MEDICARE

## 2025-04-14 ENCOUNTER — OFFICE VISIT (OUTPATIENT)
Dept: INTERNAL MEDICINE | Facility: CLINIC | Age: 65
End: 2025-04-14
Payer: MEDICARE

## 2025-04-14 VITALS
HEIGHT: 66 IN | WEIGHT: 189.13 LBS | HEART RATE: 90 BPM | DIASTOLIC BLOOD PRESSURE: 80 MMHG | SYSTOLIC BLOOD PRESSURE: 130 MMHG | OXYGEN SATURATION: 96 % | BODY MASS INDEX: 30.4 KG/M2

## 2025-04-14 DIAGNOSIS — R11.0 NAUSEA: ICD-10-CM

## 2025-04-14 DIAGNOSIS — R39.9 UTI SYMPTOMS: Primary | ICD-10-CM

## 2025-04-14 DIAGNOSIS — R53.83 FATIGUE, UNSPECIFIED TYPE: ICD-10-CM

## 2025-04-14 DIAGNOSIS — R53.81 MALAISE: ICD-10-CM

## 2025-04-14 LAB
BILIRUB SERPL-MCNC: NORMAL MG/DL
BILIRUB UR QL STRIP.AUTO: NEGATIVE
BLOOD URINE, POC: NORMAL
CLARITY UR: CLEAR
CLARITY, POC UA: CLEAR
COLOR UR AUTO: COLORLESS
COLOR, POC UA: NORMAL
CTP QC/QA: YES
CTP QC/QA: YES
GLUCOSE UR QL STRIP: NEGATIVE
GLUCOSE UR QL STRIP: NORMAL
HGB UR QL STRIP: NEGATIVE
KETONES UR QL STRIP: NEGATIVE
KETONES UR QL STRIP: NORMAL
LEUKOCYTE ESTERASE UR QL STRIP: NEGATIVE
LEUKOCYTE ESTERASE URINE, POC: NORMAL
NITRITE UR QL STRIP: NEGATIVE
NITRITE, POC UA: NORMAL
PH UR STRIP: 6 [PH]
PH, POC UA: 5.5
POC MOLECULAR INFLUENZA A AGN: NEGATIVE
POC MOLECULAR INFLUENZA B AGN: NEGATIVE
PROT UR QL STRIP: NEGATIVE
PROTEIN, POC: NORMAL
SARS-COV-2 RDRP RESP QL NAA+PROBE: NEGATIVE
SP GR UR STRIP: 1
SPECIFIC GRAVITY, POC UA: 1.01
UROBILINOGEN UR STRIP-ACNC: NEGATIVE EU/DL
UROBILINOGEN, POC UA: 0.2

## 2025-04-14 PROCEDURE — 87502 INFLUENZA DNA AMP PROBE: CPT | Mod: PBBFAC

## 2025-04-14 PROCEDURE — 99999PBSHW: Mod: PBBFAC,,,

## 2025-04-14 PROCEDURE — 87635 SARS-COV-2 COVID-19 AMP PRB: CPT | Mod: PBBFAC

## 2025-04-14 PROCEDURE — 87086 URINE CULTURE/COLONY COUNT: CPT

## 2025-04-14 PROCEDURE — 99999PBSHW POCT INFLUENZA A/B MOLECULAR: Mod: PBBFAC,,,

## 2025-04-14 PROCEDURE — 99214 OFFICE O/P EST MOD 30 MIN: CPT | Mod: PBBFAC

## 2025-04-14 PROCEDURE — 99999PBSHW POCT URINE DIPSTICK WITHOUT MICROSCOPE: Mod: PBBFAC,,,

## 2025-04-14 PROCEDURE — 81003 URINALYSIS AUTO W/O SCOPE: CPT

## 2025-04-14 PROCEDURE — 81002 URINALYSIS NONAUTO W/O SCOPE: CPT | Mod: PBBFAC

## 2025-04-14 PROCEDURE — 99999 PR PBB SHADOW E&M-EST. PATIENT-LVL IV: CPT | Mod: PBBFAC,,,

## 2025-04-14 RX ORDER — ONDANSETRON 4 MG/1
4 TABLET, ORALLY DISINTEGRATING ORAL ONCE
Qty: 1 TABLET | Refills: 0 | Status: SHIPPED | OUTPATIENT
Start: 2025-04-14 | End: 2025-04-14

## 2025-04-14 NOTE — TELEPHONE ENCOUNTER
----- Message from John sent at 4/14/2025  1:57 PM CDT -----  Contact: pt @ [phone]  Name of Who is Calling: pt  What is the request in detail: calling to ask the provider if he has availability to see her today  Can the clinic reply by [facility]: no  [facility]: [phone]

## 2025-04-14 NOTE — PROGRESS NOTES
INTERNAL MEDICINE PROGRESS/URGENT CARE NOTE    Patient: Abiola Borjas  : 1960  MRN: [id number]  PCP: César Contreras MD    CHIEF COMPLAINT     Chief Complaint   Patient presents with    Urinary Tract Infection    Nausea    Headache     Previous UTI symptoms last week returning x 2 days       HPI     Abiola is a 64 y.o. female with anxiety, fatty liver who presents for an urgent visit today.    History of Present Illness    CHIEF COMPLAINT:  Ms. Borjas presents today with fatigue, nausea, and malaise    HISTORY OF PRESENT ILLNESS:  She reports fatigue and malaise for approximately one week with decreased appetite, having missed dinner for the past couple of nights. She experiences nausea with retching without vomiting and frequent belching. She endorses feeling depressed and down x1 day. She has been taking Prilosec for symptom management.    MUSCULOSKELETAL:  She reports back pain for about one week, exacerbated by lifting her three-year-old grandchild. She takes Tylenol 1-2 tablets during the day and 2 at night. She also takes a muscle relaxant and gabapentin for nighttime leg tightness.    GENITOURINARY:  She recently had a UTI following a CT W Contrast, which she attributes to the contrast procedure. She denies any prior history of UTIs. Follow-up urinalysis was performed one week after completing antibiotic treatment with normal findings.    GYNECOLOGIC HISTORY:  She has a history of endometrial thickening which has resolved with normal findings on recent evaluation.      ROS:  General: +fatigue, +malaise  Gastrointestinal: +nausea, +loss of appetite, +excessive belching  Musculoskeletal: +back pain, +leg cramping  Psychiatric: +depression                   Past Medical History:  Past Medical History:   Diagnosis Date    Allergy     Anxiety     Colon polyps 2014    Deviated septum     Diverticulosis of sigmoid colon     Focal nodular hyperplasia of liver     H/O bone density study 2014    Normal  @  "[facility]    Hormone replacement therapy (HRT)     Hypoglycemia     Hypothyroidism     PONV (postoperative nausea and vomiting)     Snores     Thyroid disease         Past Surgical History:  Past Surgical History:   Procedure Laterality Date    ABDOMINAL HERNIA REPAIR       SECTION      CHOLECYSTECTOMY      COLECTOMY  2015    [facility] for stricture    COLON SURGERY  2015    [facility] for Stricture     COLONOSCOPY  2015    Stricture     ENDOMETRIAL ABLATION      HERNIA REPAIR          Allergies:  Review of patient's allergies indicates:  No Known Allergies    Home Medications:  Current Medications[1]         PHYSICAL EXAM     Vitals:    25 1508   BP: 130/80   BP Location: Left arm   Patient Position: Sitting   Pulse: 90   SpO2: 96%   Weight: 85.8 kg (189 lb 2.5 oz)   Height: 5' 6" (1.676 m)      Body mass index is 30.53 kg/m².     Physical Exam  Constitutional:       General: She is not in acute distress.     Appearance: Normal appearance. She is ill-appearing. She is not toxic-appearing or diaphoretic.   HENT:      Head: Normocephalic.      Right Ear: Tympanic membrane and ear canal normal.      Left Ear: Tympanic membrane and ear canal normal.      Nose: Nose normal.      Mouth/Throat:      Mouth: Mucous membranes are moist.      Pharynx: Oropharynx is clear. No oropharyngeal exudate or posterior oropharyngeal erythema.   Eyes:      Extraocular Movements: Extraocular movements intact.      Pupils: Pupils are equal, round, and reactive to light.   Cardiovascular:      Rate and Rhythm: Normal rate and regular rhythm.      Pulses: Normal pulses.   Pulmonary:      Effort: Pulmonary effort is normal. No respiratory distress.      Breath sounds: Normal breath sounds. No wheezing.   Abdominal:      General: Bowel sounds are normal. There is no distension.      Palpations: Abdomen is soft.      Tenderness: There is no abdominal tenderness. There is no guarding.   Musculoskeletal:         General: " Normal range of motion.      Cervical back: Normal range of motion and neck supple. No rigidity or tenderness.      Right lower leg: No edema.      Left lower leg: No edema.   Lymphadenopathy:      Cervical: No cervical adenopathy.   Skin:     General: Skin is warm and dry.   Neurological:      General: No focal deficit present.      Mental Status: She is alert and oriented to person, place, and time.           LABS     Lab Results   Component Value Date    HGBA1C 5.6 11/06/2024     CMP  Sodium   Date Value Ref Range Status   03/06/2025 141 136 - 145 mmol/L Final     Potassium   Date Value Ref Range Status   03/06/2025 4.4 3.5 - 5.1 mmol/L Final     Chloride   Date Value Ref Range Status   03/06/2025 105 95 - 110 mmol/L Final     CO2   Date Value Ref Range Status   03/06/2025 26 23 - 29 mmol/L Final     Glucose   Date Value Ref Range Status   03/06/2025 115 (H) 70 - 110 mg/dL Final     BUN   Date Value Ref Range Status   03/06/2025 11 8 - 23 mg/dL Final     Creatinine   Date Value Ref Range Status   03/06/2025 0.7 0.5 - 1.4 mg/dL Final     Calcium   Date Value Ref Range Status   03/06/2025 9.7 8.7 - 10.5 mg/dL Final     Total Protein   Date Value Ref Range Status   02/28/2025 7.9 6.0 - 8.4 g/dL Final     Albumin   Date Value Ref Range Status   02/28/2025 4.1 3.5 - 5.2 g/dL Final     Total Bilirubin   Date Value Ref Range Status   02/28/2025 0.3 0.1 - 1.0 mg/dL Final     Comment:     For infants and newborns, interpretation of results should be based  on gestational age, weight and in agreement with clinical  observations.    Premature Infant recommended reference ranges:  Up to 24 hours.............<8.0 mg/dL  Up to 48 hours............<12.0 mg/dL  3-5 days..................<15.0 mg/dL  6-29 days.................<15.0 mg/dL       Alkaline Phosphatase   Date Value Ref Range Status   02/28/2025 95 40 - 150 U/L Final     AST   Date Value Ref Range Status   02/28/2025 29 10 - 40 U/L Final     ALT   Date Value Ref Range  Status   02/28/2025 33 10 - 44 U/L Final     Anion Gap   Date Value Ref Range Status   03/06/2025 10 8 - 16 mmol/L Final     eGFR if    Date Value Ref Range Status   01/31/2022 >60.0 >60 mL/min/1.73 m^2 Final     eGFR if non    Date Value Ref Range Status   01/31/2022 >60.0 >60 mL/min/1.73 m^2 Final     Comment:     Calculation used to obtain the estimated glomerular filtration  rate (eGFR) is the CKD-EPI equation.        Lab Results   Component Value Date    WBC 8.40 03/06/2025    HGB 14.4 03/06/2025    HCT 45.2 03/06/2025    MCV 89 03/06/2025     03/06/2025     Lab Results   Component Value Date    CHOL 157 09/17/2024    CHOL 181 11/08/2023    CHOL 172 09/01/2022     Lab Results   Component Value Date    HDL 43 09/17/2024    HDL 46 11/08/2023    HDL 49 09/01/2022     Lab Results   Component Value Date    LDLCALC 102.4 09/17/2024    LDLCALC 120.2 11/08/2023    LDLCALC 109.8 09/01/2022     Lab Results   Component Value Date    TRIG 58 09/17/2024    TRIG 74 11/08/2023    TRIG 66 09/01/2022     Lab Results   Component Value Date    CHOLHDL 27.4 09/17/2024    CHOLHDL 25.4 11/08/2023    CHOLHDL 28.5 09/01/2022     Lab Results   Component Value Date    TSH 2.931 02/28/2025    W9MTEWQ 128 03/01/2023       ASSESSMENT & PLAN       1. UTI symptoms  -     Urinalysis Microscopic; Future; Expected date: 04/14/2025  -     Urinalysis  -     Urine Culture High Risk  -     POCT urine dipstick without microscope    2. Nausea  -     Urinalysis Microscopic; Future; Expected date: 04/14/2025  -     ondansetron (ZOFRAN-ODT) 4 MG TbDL; Take 1 tablet (4 mg total) by mouth once. for 1 dose  Dispense: 1 tablet; Refill: 0    3. Fatigue, unspecified type  -     POCT COVID-19 Rapid Screening  -     POCT Influenza A/B Molecular    4. Malaise  -     POCT COVID-19 Rapid Screening  -     POCT Influenza A/B Molecular          Assessment & Plan    Q85.9 Hamartoma of liver  F32.9 Major depressive disorder,  single episode, unspecified  M54.9 Dorsalgia, unspecified  R53.83 Other fatigue  R11.2 Nausea with vomiting, unspecified  Z87.448 Personal history of other diseases of urinary system  Z86.018 Personal history of other benign neoplasm    IMPRESSION:  - Considered multiple potential etiologies for symptoms, including UTI, viral infection, and GI issues.    Fatigue and Malaise  - Ms. Borjas reported feeling very tired and down for about a week, with decreased appetite, sleeping most of the day, and experiencing nausea, belching, and retching without vomiting.  - Recent OBGYN checkup in [place] showed normal uterine wall thickness.  - Previous urinalysis showed bacteria, but recent one did not include a bacteria screen.  - Ordered urine culture to rule out UTI and considered viral symptoms.  - Suggested swabbing for influenza and COVID-19 to broaden diagnostic possibilities.    BACK PAIN:  - Ms. Borjas reports back pain and uses acetaminophen for relief.  - Patient takes muscle relaxant and gabapentin for pain management.    NAUSEA AND VOMITING:  - Ms. Borjas has been taking omeprazole and ondansetron for symptom management.    HISTORY OF URINARY TRACT INFECTION:  - Ms. Borjas reports previous UTI diagnosed in [place].  - Ordered urinalysis and culture for comprehensive UTI check.         POCT flu and covid testing negative in clinic. Urine dipstick with normal findings. UA, micro, culture pending. Suspicion for viral illness. Recommend rest, fluids, regular meals as tolerated to keep up energy. Zofran, prilosec prn. Advised patient to f/u in clinic if no improvement.      Follow up if symptoms worsen or fail to improve.    Patient was counseled on when to seek emergent care. Patient's plan/treatment was discussed including medications and possible side effects. Verbalized understanding of all instructions.            KIMBERLY Hua, FNP-C  Department of Internal Medicine  [facility]    This note was generated with the assistance of ambient listening technology. Verbal consent was obtained by the patient and accompanying visitor(s) for the recording of patient appointment to facilitate this note. I attest to having reviewed and edited the generated note for accuracy, though some syntax or spelling errors may persist. Please contact the author of this note for any clarification.           [1]   Current Outpatient Medications:     acetaminophen (TYLENOL) 500 MG tablet, Take 1,000 mg by mouth., Disp: , Rfl:     ALPRAZolam (XANAX) 0.5 MG tablet, Take 0.5 mg by mouth 3 (three) times daily as needed. , Disp: , Rfl:     CYTOMEL 25 mcg Tab, TAKE 1/2 TABLET BY MOUTH DAILY, Disp: 45 tablet, Rfl: 3    fluticasone propionate (FLONASE) 50 mcg/actuation nasal spray, 1 spray (50 mcg total) by Each Nostril route once daily., Disp: 16 g, Rfl: 2    gabapentin (NEURONTIN) 100 MG capsule, Take 1 capsule (100 mg total) by mouth 2 (two) times daily., Disp: 60 capsule, Rfl: 1    Lactobacillus rhamnosus GG (CULTURELLE) 10 billion cell capsule, Take 1 capsule by mouth once daily., Disp: , Rfl:     lancets Misc, 1 Device by Misc.(Non-Drug; Combo Route) route 2 (two) times daily., Disp: 100 each, Rfl: 11    loratadine (CLARITIN) 10 mg tablet, Take 10 mg by mouth once daily., Disp: , Rfl:     magnesium 30 mg Tab, Take by mouth once. 250 mg twice a day, Disp: , Rfl:     melatonin 3 mg Tab, Take 5 mg by mouth., Disp: , Rfl:     methocarbamoL (ROBAXIN) 500 MG Tab, Take 1 tablet (500 mg total) by mouth every evening., Disp: 30 tablet, Rfl: 5    multivitamin capsule, Take 1 capsule by mouth once daily., Disp: , Rfl:     ondansetron (ZOFRAN) 4 MG tablet, Take 1 tablet (4 mg total) by mouth every 8 (eight) hours as needed for Nausea., Disp: 30 tablet, Rfl: 0    SYNTHROID 88 mcg tablet, TAKE 1 TABLET(88 MCG) BY MOUTH BEFORE BREAKFAST, Disp: 90 tablet, Rfl: 3    TRUE METRIX GLUCOSE TEST STRIP Strp, 1 STRIP BY  MISC.(NON-DRUG COMBO ROUTE) ROUTE 2 (TWO) TIMES DAILY., Disp: 100 strip, Rfl: 10    turmeric 400 mg Cap, Take 1 tablet by mouth every 12 (twelve) hours., Disp: , Rfl:     vitamin C-multivitamin-mineral 500 mg Chew, Take by mouth., Disp: , Rfl:     vitamin D (VITAMIN D3) 1000 units Tab, Take 1,000 Units by mouth once daily., Disp: , Rfl:     zinc gluconate 50 mg tablet, Take 50 mg by mouth once daily., Disp: , Rfl:     azelastine (ASTELIN) 137 mcg (0.1 %) nasal spray, 2 sprays (274 mcg total) by Nasal route 2 (two) times daily. (Patient not taking: Reported on 4/14/2025), Disp: 30 mL, Rfl: 0    azithromycin (Z-SOFIA) 250 MG tablet, As directed (Patient not taking: Reported on 4/14/2025), Disp: 6 tablet, Rfl: 0    cyanocobalamin (VITAMIN B-12) 100 MCG tablet, Take 100 mcg by mouth once daily. (Patient not taking: Reported on 1/28/2025), Disp: , Rfl:     ibuprofen (ADVIL,MOTRIN) 200 MG tablet, Take 200 mg by mouth every 8 (eight) hours as needed. (Patient not taking: Reported on 1/28/2025), Disp: , Rfl:     omega-3 fatty acids/fish oil (FISH OIL-OMEGA-3 FATTY ACIDS) 300-1,000 mg capsule, Take by mouth once daily. (Patient not taking: Reported on 1/28/2025), Disp: , Rfl:     ondansetron (ZOFRAN-ODT) 4 MG TbDL, Take 1 tablet (4 mg total) by mouth once. for 1 dose, Disp: 1 tablet, Rfl: 0    pantoprazole (PROTONIX) 40 MG tablet, Take 1 tablet (40 mg total) by mouth once daily. for 14 days (Patient not taking: Reported on 4/14/2025), Disp: 14 tablet, Rfl: 0

## 2025-04-14 NOTE — TELEPHONE ENCOUNTER
Spoke to pt; she will see Lillie Reynaga today but wants Dr. Contreras to f/u since he doesn't have any openings today   
Unable

## 2025-04-14 NOTE — TELEPHONE ENCOUNTER
Spoke with pt and let her know Dr. Contreras doesn't have any openings today; pt is only trying to get a urine culture

## 2025-04-15 ENCOUNTER — TELEPHONE (OUTPATIENT)
Dept: INTERNAL MEDICINE | Facility: CLINIC | Age: 65
End: 2025-04-15
Payer: MEDICARE

## 2025-04-15 ENCOUNTER — RESULTS FOLLOW-UP (OUTPATIENT)
Dept: INTERNAL MEDICINE | Facility: CLINIC | Age: 65
End: 2025-04-15

## 2025-04-15 NOTE — TELEPHONE ENCOUNTER
----- Message from Irvin sent at 4/15/2025  2:28 PM CDT -----  Contact: [phone]  [email or web address] Patient is calling for Medical Advice regarding:pt is calling in regards to still feeling bad and is trying to see if she can get an apt she feel like its getting worse Patient wants a call back or thru [facility]:call back Comments:Please advise patient replies from provider may take up to 48 hours.

## 2025-04-15 NOTE — TELEPHONE ENCOUNTER
I left a message for the patent in regards to the patient is not feeling well for the patient to give the office a call back .

## 2025-04-16 ENCOUNTER — TELEPHONE (OUTPATIENT)
Dept: INTERNAL MEDICINE | Facility: CLINIC | Age: 65
End: 2025-04-16
Payer: MEDICARE

## 2025-04-16 LAB — BACTERIA UR CULT: NO GROWTH

## 2025-04-16 NOTE — TELEPHONE ENCOUNTER
----- Message from Irvin sent at 4/16/2025  9:20 AM CDT -----  Contact: [phone]  Patient is returning a phone call.Who left a message for the patient: Roni Higgins MADoes patient know what this is regarding:  aptWould you like a call back, or a response through your [facility] portal?:   call backComments:

## 2025-04-16 NOTE — TELEPHONE ENCOUNTER
----- Message from Janay sent at 4/15/2025  4:48 PM CDT -----  Contact: [phone] Patient  Patient is returning a phone call.Who left a message for the patient: Martina Potts MADoes patient know what this is regarding:  Would you like a call back, or a response through your [facility] portal?:   Call Back Please. Thank youComments: Pt is nauseated, headache and threw up. Pt stated a month ago she had the same thing.

## 2025-04-16 NOTE — TELEPHONE ENCOUNTER
I called and spoke with pt, and she said she starting feeling bad, more tired.  She said saw Lillie on Monday, and was prescribed zofran.  She would like to see Dr. Jamil, because she said she felt like this a month ago.

## 2025-04-17 ENCOUNTER — OFFICE VISIT (OUTPATIENT)
Dept: INTERNAL MEDICINE | Facility: CLINIC | Age: 65
End: 2025-04-17
Payer: MEDICARE

## 2025-04-17 ENCOUNTER — LAB VISIT (OUTPATIENT)
Dept: LAB | Facility: HOSPITAL | Age: 65
End: 2025-04-17
Payer: MEDICARE

## 2025-04-17 VITALS
BODY MASS INDEX: 30.01 KG/M2 | OXYGEN SATURATION: 96 % | WEIGHT: 186.75 LBS | SYSTOLIC BLOOD PRESSURE: 118 MMHG | DIASTOLIC BLOOD PRESSURE: 70 MMHG | HEART RATE: 105 BPM | TEMPERATURE: 99 F | HEIGHT: 66 IN

## 2025-04-17 DIAGNOSIS — I95.9 HYPOTENSION, UNSPECIFIED HYPOTENSION TYPE: ICD-10-CM

## 2025-04-17 DIAGNOSIS — E03.9 HYPOTHYROIDISM, UNSPECIFIED TYPE: ICD-10-CM

## 2025-04-17 DIAGNOSIS — R50.9 FEBRILE ILLNESS: ICD-10-CM

## 2025-04-17 DIAGNOSIS — R53.83 FATIGUE, UNSPECIFIED TYPE: Primary | ICD-10-CM

## 2025-04-17 DIAGNOSIS — R53.83 FATIGUE, UNSPECIFIED TYPE: ICD-10-CM

## 2025-04-17 LAB
ALBUMIN SERPL BCP-MCNC: 4 G/DL (ref 3.5–5.2)
ALP SERPL-CCNC: 95 UNIT/L (ref 40–150)
ALT SERPL W/O P-5'-P-CCNC: 37 UNIT/L (ref 10–44)
ANION GAP (OHS): 12 MMOL/L (ref 8–16)
AST SERPL-CCNC: 25 UNIT/L (ref 11–45)
BILIRUB DIRECT SERPL-MCNC: 0.2 MG/DL (ref 0.1–0.3)
BILIRUB SERPL-MCNC: 0.5 MG/DL (ref 0.1–1)
BUN SERPL-MCNC: 13 MG/DL (ref 8–23)
CALCIUM SERPL-MCNC: 9.6 MG/DL (ref 8.7–10.5)
CHLORIDE SERPL-SCNC: 101 MMOL/L (ref 95–110)
CO2 SERPL-SCNC: 23 MMOL/L (ref 23–29)
CREAT SERPL-MCNC: 0.7 MG/DL (ref 0.5–1.4)
CTP QC/QA: YES
CTP QC/QA: YES
GFR SERPLBLD CREATININE-BSD FMLA CKD-EPI: >60 ML/MIN/1.73/M2
GLUCOSE SERPL-MCNC: 91 MG/DL (ref 70–110)
MOLECULAR STREP A: NEGATIVE
POTASSIUM SERPL-SCNC: 4 MMOL/L (ref 3.5–5.1)
PROT SERPL-MCNC: 8.1 GM/DL (ref 6–8.4)
SARS-COV-2 RDRP RESP QL NAA+PROBE: NEGATIVE
SODIUM SERPL-SCNC: 136 MMOL/L (ref 136–145)
T3FREE SERPL-MCNC: 4.1 PG/ML (ref 2.3–4.2)
T4 FREE SERPL-MCNC: 1.14 NG/DL (ref 0.71–1.51)
TSH SERPL-ACNC: 1.68 UIU/ML (ref 0.4–4)

## 2025-04-17 PROCEDURE — 84439 ASSAY OF FREE THYROXINE: CPT

## 2025-04-17 PROCEDURE — 82248 BILIRUBIN DIRECT: CPT

## 2025-04-17 PROCEDURE — 87651 STREP A DNA AMP PROBE: CPT | Mod: PBBFAC | Performed by: INTERNAL MEDICINE

## 2025-04-17 PROCEDURE — 87635 SARS-COV-2 COVID-19 AMP PRB: CPT | Mod: PBBFAC | Performed by: INTERNAL MEDICINE

## 2025-04-17 PROCEDURE — 84443 ASSAY THYROID STIM HORMONE: CPT

## 2025-04-17 PROCEDURE — 84481 FREE ASSAY (FT-3): CPT

## 2025-04-17 PROCEDURE — 99215 OFFICE O/P EST HI 40 MIN: CPT | Mod: PBBFAC | Performed by: INTERNAL MEDICINE

## 2025-04-17 PROCEDURE — 99999PBSHW: Mod: PBBFAC,,,

## 2025-04-17 PROCEDURE — 99214 OFFICE O/P EST MOD 30 MIN: CPT | Mod: S$PBB,,, | Performed by: INTERNAL MEDICINE

## 2025-04-17 PROCEDURE — 85025 COMPLETE CBC W/AUTO DIFF WBC: CPT

## 2025-04-17 PROCEDURE — 99999PBSHW POCT STREP A MOLECULAR: Mod: PBBFAC,,,

## 2025-04-17 PROCEDURE — 80053 COMPREHEN METABOLIC PANEL: CPT

## 2025-04-17 PROCEDURE — 36415 COLL VENOUS BLD VENIPUNCTURE: CPT

## 2025-04-17 PROCEDURE — 99999 PR PBB SHADOW E&M-EST. PATIENT-LVL V: CPT | Mod: PBBFAC,,, | Performed by: INTERNAL MEDICINE

## 2025-04-17 NOTE — PROGRESS NOTES
EDICAL HISTORY:  Hypothyroid disease.  Pre diabetes  Fatty liver with focal nodular hyperplasia.  Anxiety disorder.  Cholecystectomy with pancreatitis.   x3.  Abdominal hernia repair.  Diverticula disease of the colon, for which she had in , a sigmoid colectomy due to diverticulitis, then a stent put in a month later because of microperforation. In 2015, she underwent a balloon dilatation due to stenosis of the colonic anastomosis        Oncology History Overview Note   Diverticulitis   1. 2014: Surgery - Lap segmental resection of sigmoid cancer  2. Complicated by anastomotic leak and subsequent stricture managed conservatively with IR drain and repeat balloon dilations  3. 12/7/15: Robo lap exploration, laparoscopic hand-assisted redo low anterior resection, hand assisted omental advancement flap, cysto and stents     SOCIAL HISTORY:  Tobacco use and alcohol use -- none.       Medications  Xanax 0.5 mg 3 times daily   cytomel 25 mcg half a tablet daily   Levothyroxine 0.088 mg daily   Flonase 2 puffs daily   Claritin once a day  Over-the-counter Pepcid as needed     Methocarbamol 500 mg q.h.s.   Gabapentin 100 mg b.i.d.      Female   For the past 5 days she has not felt well.  Nausea, headache, belching, sleepy, diminished appetite, fatigue, upset stomach.    Recently she was concern of urinary tract infection.  She that has at [facility] for [facility]necological check month ago.  Urinalysis was performed the reason for the urinalysis not totally certain she was not having any active urine symptoms.  That has showed bacteria.  Was prescribed Macrobid twice a day for 5 days.  She was seen by that has practitioner couple days ago she was feeling little bit better at the time.  Repeat urinalysis and culture was negative.    That has noted that she was instructed previously the take Cytomel every other day but now she is back to taking every day think that has that has maybe the reason.    She that  has complain of an irritated throat.  She does not feel short of breath.  No chest pain.  The other day she had a bout of mi abdominal pain in his left side but this is past.  That has no diarrhea    Examination   Weight 186  Pulse 100 sitting 100 standing  Blood pressure sitting 100/62 standing that has 2/60   Temperature 99.2° by my assistant later by me 99.7  Oropharynx hyperemic   Swollen right submandibular lymph gland   Chest clear breath sounds  Heart regular rate and rhythm  Abdominal exam is bowel sounds soft no defined tender anywhere.  That has slightly uncomfortable throughout.  No rebound or guarding    Impression   Fatigue   Hypotension   Febrile illness  Hypothyroid    Plan   CBC chemistry C-reactive protein  Throat swab for strep   Nasal swab for COVID

## 2025-04-18 LAB
ABSOLUTE EOSINOPHIL (OHS): 0.06 K/UL
ABSOLUTE MONOCYTE (OHS): 0.56 K/UL (ref 0.3–1)
ABSOLUTE NEUTROPHIL COUNT (OHS): 5.91 K/UL (ref 1.8–7.7)
BASOPHILS # BLD AUTO: 0.04 K/UL
BASOPHILS NFR BLD AUTO: 0.5 %
ERYTHROCYTE [DISTWIDTH] IN BLOOD BY AUTOMATED COUNT: 13.5 % (ref 11.5–14.5)
HCT VFR BLD AUTO: 44.2 % (ref 37–48.5)
HGB BLD-MCNC: 14.4 GM/DL (ref 12–16)
IMM GRANULOCYTES # BLD AUTO: 0.06 K/UL (ref 0–0.04)
IMM GRANULOCYTES NFR BLD AUTO: 0.7 % (ref 0–0.5)
LYMPHOCYTES # BLD AUTO: 2.08 K/UL (ref 1–4.8)
MCH RBC QN AUTO: 28.7 PG (ref 27–31)
MCHC RBC AUTO-ENTMCNC: 32.6 G/DL (ref 32–36)
MCV RBC AUTO: 88 FL (ref 82–98)
NUCLEATED RBC (/100WBC) (OHS): 0 /100 WBC
PLATELET # BLD AUTO: 310 K/UL (ref 150–450)
PMV BLD AUTO: 10.6 FL (ref 9.2–12.9)
RBC # BLD AUTO: 5.02 M/UL (ref 4–5.4)
RELATIVE EOSINOPHIL (OHS): 0.7 %
RELATIVE LYMPHOCYTE (OHS): 23.9 % (ref 18–48)
RELATIVE MONOCYTE (OHS): 6.4 % (ref 4–15)
RELATIVE NEUTROPHIL (OHS): 67.8 % (ref 38–73)
WBC # BLD AUTO: 8.71 K/UL (ref 3.9–12.7)

## 2025-04-21 ENCOUNTER — TELEPHONE (OUTPATIENT)
Dept: INTERNAL MEDICINE | Facility: CLINIC | Age: 65
End: 2025-04-21
Payer: MEDICARE

## 2025-04-21 NOTE — TELEPHONE ENCOUNTER
----- Message from Abby sent at 4/21/2025  3:40 PM CDT -----  Contact: [phone]  Caller is requesting an earlier appointment then we can schedule.  Caller is requesting a message be sent to the provider.If this is for urgent care symptoms, did you offer other providers at this location, providers at other locations, or [facility]? (yes, no, n/a):  Pt States need to see her pcp If this is for the patients physical, did you offer to schedule next available and put on wait list, or to see NP or PA for their physical?  (yes, no, n/a):  just want to see pcp When is the next available appointment with their provider:  04/29/2025Reason for the appointment:   F/U Patient still not feeling wellPatient preference of timeframe to be scheduled:  Patient would like to come on Wednesdays Would the patient like a call back, or a response through their [facility] portal?:   call back Comments:

## 2025-04-22 ENCOUNTER — TELEPHONE (OUTPATIENT)
Dept: INTERNAL MEDICINE | Facility: CLINIC | Age: 65
End: 2025-04-22
Payer: MEDICARE

## 2025-04-22 NOTE — TELEPHONE ENCOUNTER
----- Message from Yolis sent at 4/22/2025  1:50 PM CDT -----  Contact: [phone]@patient  Patient is returning a phone call. Yes Who left a message for the patient: Roni Higgins MADoes patient know what this is regarding:  Would you like a call back, or a response through your [facility] portal?:   Call back Comments: Pt says she is not feeling well and would like to get a apt. Pt was offered a apt with someone on his team

## 2025-04-23 ENCOUNTER — HOSPITAL ENCOUNTER (OUTPATIENT)
Dept: RADIOLOGY | Facility: HOSPITAL | Age: 65
Discharge: HOME | End: 2025-04-23
Attending: INTERNAL MEDICINE
Payer: MEDICARE

## 2025-04-23 ENCOUNTER — OFFICE VISIT (OUTPATIENT)
Dept: INTERNAL MEDICINE | Facility: CLINIC | Age: 65
End: 2025-04-23
Payer: MEDICARE

## 2025-04-23 VITALS
HEART RATE: 97 BPM | SYSTOLIC BLOOD PRESSURE: 124 MMHG | BODY MASS INDEX: 30.04 KG/M2 | OXYGEN SATURATION: 96 % | HEIGHT: 66 IN | DIASTOLIC BLOOD PRESSURE: 68 MMHG | WEIGHT: 186.94 LBS

## 2025-04-23 DIAGNOSIS — R19.8 ABDOMINAL FULLNESS: ICD-10-CM

## 2025-04-23 DIAGNOSIS — K59.00 CONSTIPATION, UNSPECIFIED CONSTIPATION TYPE: ICD-10-CM

## 2025-04-23 DIAGNOSIS — R11.0 NAUSEA: ICD-10-CM

## 2025-04-23 DIAGNOSIS — R10.13 EPIGASTRIC PAIN: ICD-10-CM

## 2025-04-23 DIAGNOSIS — R10.13 EPIGASTRIC PAIN: Primary | ICD-10-CM

## 2025-04-23 PROCEDURE — 74019 RADEX ABDOMEN 2 VIEWS: CPT | Mod: 26,,, | Performed by: INTERNAL MEDICINE

## 2025-04-23 PROCEDURE — 74019 RADEX ABDOMEN 2 VIEWS: CPT | Mod: TC

## 2025-04-23 PROCEDURE — 99999 PR PBB SHADOW E&M-EST. PATIENT-LVL IV: CPT | Mod: PBBFAC,,, | Performed by: INTERNAL MEDICINE

## 2025-04-23 PROCEDURE — 99214 OFFICE O/P EST MOD 30 MIN: CPT | Mod: S$PBB,,, | Performed by: INTERNAL MEDICINE

## 2025-04-23 PROCEDURE — 99214 OFFICE O/P EST MOD 30 MIN: CPT | Mod: PBBFAC | Performed by: INTERNAL MEDICINE

## 2025-04-23 NOTE — PROGRESS NOTES
Show:Clear all  [x]Written[]Templated[x]Copied    Added by:  [x]César Contreras MD    []Felicita for details  EDICAL HISTORY:  Hypothyroid disease.  Pre diabetes  Fatty liver with focal nodular hyperplasia.  Anxiety disorder.  Cholecystectomy with pancreatitis.   x3.  Abdominal hernia repair.  Diverticula disease of the colon, for which she had in , a sigmoid colectomy due to diverticulitis, then a stent put in a month later because of microperforation. In 2015, she underwent a balloon dilatation due to stenosis of the colonic anastomosis        Oncology History Overview Note   Diverticulitis   1. 2014: Surgery - Lap segmental resection of sigmoid cancer  2. Complicated by anastomotic leak and subsequent stricture managed conservatively with IR drain and repeat balloon dilations  3. 12/7/15: Robo lap exploration, laparoscopic hand-assisted redo low anterior resection, hand assisted omental advancement flap, cysto and stents     SOCIAL HISTORY:  Tobacco use and alcohol use -- none.       Medications  Xanax 0.5 mg 3 times daily   cytomel 25 mcg half a tablet daily od  Levothyroxine 0.088 mg daily   Flonase 2 puffs daily   Claritin once a day  Over-the-counter Pepcid as needed     Methocarbamol 500 mg q.h.s.   Gabapentin 100 mg ks     64-year-old female that has recently seen ,  .  Also was seen in his early March in that has well as evaluated at [facility] in his mid to late March.    Close to 10 days to 2 weeks she that has that has that has been feeling well  persistently feeling nausea although today it that has better.  She that has not having regular daily bowel function.  That has when she that has stool that has little small bowel is in his that has regular formed stools.  No diarrhea.  Actually 4 days ago she that has at the remedy room to get IV fluids in his electrolytes.  She does not have much in his appetite but she that has able to taking food.  No fever.        She that has known to have hepatic cyst and pancreatic cyst which that has stable.  November 17th had a abdominal CT which did not know in his anterior wall hernia  .  Fat,  Examination   Vital signs per epic, heart rate that has 96  Chest clear breath sounds   Heart regular rate and rhythm   Abdominal exam is bowel sounds soft , uncomfortable in the epigastric region palpation.  An area of soft tissue swelling little bit above the umbilicus when that has increased abdominal pressure.  Tender to touch.  Although I do not palpate a defect    When she was at [facility] she had an upper endoscopy told of gastritis in his she that has been taking omeprazole 20 mg daily but she also pantoprazole which she has not used yet    Impression   Persistent epigastric pain with nausea, probably due to gastritis, irritable bowel the may resulted from a virus    Constipation  Abdominal soft tissue swelling either that of diastasis recti maybe could be a hernia    Plan   Today repeat a CBC chemistry she that has concern about electrolytes  That has lipase amylase  Abdominal x-ray to make sure that has no obstruction  Consider pantoprazole for 2 weeks in place of the omeprazole  Use of MiraLax once a day for week

## 2025-04-24 ENCOUNTER — TELEPHONE (OUTPATIENT)
Dept: GASTROENTEROLOGY | Facility: CLINIC | Age: 65
End: 2025-04-24
Payer: MEDICARE

## 2025-04-24 ENCOUNTER — PATIENT MESSAGE (OUTPATIENT)
Dept: INTERNAL MEDICINE | Facility: CLINIC | Age: 65
End: 2025-04-24
Payer: MEDICARE

## 2025-04-24 NOTE — TELEPHONE ENCOUNTER
Spoke with patient.  C/o upset stomach and vomiting in the mornings.   Scheduled to see Dr.Jordan Flores on 4/30 at 1:00.   Confirmation mailed.   Marjan

## 2025-04-25 ENCOUNTER — RESULTS FOLLOW-UP (OUTPATIENT)
Dept: INTERNAL MEDICINE | Facility: CLINIC | Age: 65
End: 2025-04-25

## 2025-04-30 ENCOUNTER — OFFICE VISIT (OUTPATIENT)
Dept: GASTROENTEROLOGY | Facility: CLINIC | Age: 65
End: 2025-04-30
Payer: MEDICARE

## 2025-04-30 VITALS
BODY MASS INDEX: 30.74 KG/M2 | SYSTOLIC BLOOD PRESSURE: 130 MMHG | DIASTOLIC BLOOD PRESSURE: 73 MMHG | HEIGHT: 65 IN | WEIGHT: 184.5 LBS | HEART RATE: 89 BPM

## 2025-04-30 DIAGNOSIS — R14.0 BLOATING: ICD-10-CM

## 2025-04-30 DIAGNOSIS — R11.0 NAUSEA: ICD-10-CM

## 2025-04-30 DIAGNOSIS — R14.2 BELCHING: Primary | ICD-10-CM

## 2025-04-30 PROCEDURE — 99204 OFFICE O/P NEW MOD 45 MIN: CPT | Mod: S$PBB,GC,, | Performed by: STUDENT IN AN ORGANIZED HEALTH CARE EDUCATION/TRAINING PROGRAM

## 2025-04-30 PROCEDURE — 99999 PR PBB SHADOW E&M-EST. PATIENT-LVL IV: CPT | Mod: PBBFAC,GC,, | Performed by: STUDENT IN AN ORGANIZED HEALTH CARE EDUCATION/TRAINING PROGRAM

## 2025-04-30 PROCEDURE — 99214 OFFICE O/P EST MOD 30 MIN: CPT | Mod: PBBFAC | Performed by: STUDENT IN AN ORGANIZED HEALTH CARE EDUCATION/TRAINING PROGRAM

## 2025-04-30 NOTE — PROGRESS NOTES
"GENERAL GI PATIENT INTAKE:    COVID symptoms in the last 7 days (runny nose, sore throat, congestion, cough, fever): No  PCP: César Contreras  If not PCP-  number given to establish [phone]: N/A    ALLERGIES REVIEWED:  Yes    CHIEF COMPLAINT:    Chief Complaint   Patient presents with    Emesis    Nausea       VITAL SIGNS:  /73 (Patient Position: Sitting)   Pulse 89   Ht 5' 5" (1.651 m)   Wt 83.7 kg (184 lb 8.4 oz)   LMP  (LMP Unknown) Comment: [facility]  2006  BMI 30.71 kg/m²      Change in medical, surgical, family or social history:  Reviewed by MD      REVIEWED MEDICATION LIST RECONCILED INCLUDING ABOVE MEDS:  Yes     "

## 2025-04-30 NOTE — PROGRESS NOTES
[facility]    Reason for visit: The encounter diagnosis was Belching.  Referring Provider/PCP: César Contreras MD    History of Present Illness:  Abiola Borjas is a 64 y.o. female with a history of complicated diverticulitis s/p sigmoid colectomy in 2014 (c/b anastomotic leak and stricture requiring IR drain and repeat balloon dilations) and robotic lap exploration with low anterior resection in 2015, hypothyroidism, anxiety, prior cholecystectomy who is presenting for nausea/vomiting, bloating, and belching.    Has had two discrete episodes of these issues, first around Mardi gras, lasted 8-9 days then resolved. Symptoms then recurred about two weeks ago just before a vacation. Describes nausea, poor appetite, belching, bloating. Denies abdominal pain. Trialed zofran for a week with minimal relief. Even went to a IV bar for IVFs while on vacation due to ongoing nausea and feeling dehydrated.    Took prilosec once daily throughout each of these episodes, no significant relief. Denies diarrhea. Has had more straining with BMs over the past few weeks and describes firm, pellet-like stools. Reports she has Meniere's disease but no significant vertigo/dizziness. Did not tolerate a diuretic. Only new recent medications are gabapentin and methocarbamol. Patient and her daughter both raise concern that these episodes could be related to anxiety, noting high degrees of stress/anxiety around these times.     Has had imaging and endoscopic workup recently including with GI at [facility]. EGD last month was unremarkable. Most recent imaging:    CT abdomen 3/7/25 showed no acute process. Small anterior abdominal hernia containing fat. Small hiatal hernia.     MRI/MRCP in Dec 2024 showed cystic lesions within the pancreas without suspicious features measuring up to 1 cm, likely side branch IPMN.     Other outside records summarized below from chart review:    Labs 2/5/2024: IgA 229.0, TTG IgA and  TTG IgG negative  EGD 2/8/2024-Dr. Sandhu: Small hiatal hernia. Few gastric polyps. A: Duodenum, biopsy: Mild chronic peptic duodenitis, with focal lymphoid aggregates with crush artifact. B: Stomach, antrum, biopsy: Reactive gastropathy and mild chronic inactive gastritis. No IM or Helicobacter organisms identified (H&E). C: Stomach, gastric polyp, polypectomy: Fundic gland polyp. D: Stomach, distal body, biopsy: Oxyntic mucosa with no diagnostic abnormalities. No IM or Helicobacter microorganisms identified (H&E).  PCP 3/6/2025: Compared to last week the nausea has improved somewhat but she is now feeling queasy with a burning sensation in her upper abdomen. Last night she took an OTC Prilosec as well as this morning. Pantoprazole was sent in yesterday but she has not started the medication. She is belching and passing gas. She does not have loose stools or diarrhea that she had before but she is not having a BM daily Suspected she had viral gastroenteritis syndrome. Her daughter has recently been diagnosed with H pylori. IMPRESSION: Diffuse abdominal pain gaseousness. Suspect that has maybe sequelae from a viral syndrome. Gastritis.  OS labs 3/6/2025: glucose 115, hgb 14.4, CRP 1.1, H Pylori IgG (-), magnesium 2.1, lipase 23, H pylori antigen stool (-)  OS X-ray abdomen 2 view 3/6/2025: No free air is seen underneath the diaphragm. Intestinal gas pattern is normal. Surgical clips are seen in the right lower quadrant similar to appearance on recent CT study. No masses are identified. No evidence of obstruction.  OS CT A/P w contrast 3/7/2025: No acute process seen. Small liver lesions most likely cysts. Small hiatal hernia. Right upper pole renal cyst. Small anterior abdominal hernia containing fat. Mild diverticulosis.  EGD 3/17/2025-Dr. Sandhu: Normal esophagus, stomach, and examined duodenum. A few gastric polyps. Pathology negative for HP. Normal duodenal mucosa, Chronic inactive gastritis without GIM.     EGD  2/8/2024-Dr. Sandhu: Small hiatal hernia. Few gastric polyps. A: Duodenum, biopsy: Mild chronic peptic duodenitis, with focal lymphoid aggregates with crush artifact. B: Stomach, antrum, biopsy: Reactive gastropathy and mild chronic inactive gastritis. No IM or Helicobacter organisms identified (H&E). C: Stomach, gastric polyp, polypectomy: Fundic gland polyp. D: Stomach, distal body, biopsy: Oxyntic mucosa with no diagnostic abnormalities. No IM or Helicobacter microorganisms identified (H&E).      Colonoscopy 2015  Impression:           - Stricture at the colonic anastomosis. Dilated.                         - No specimens collected.   Recommendation:       - [facility].                         - Repeat colonoscopy in 3 months for surveillance.     Flex sig  Nov 2014 - anastomotic leak, no colonic lumen able to be visualzied. Stnet placed. Three hemostatic clips placed for fixation of stent.    Colonoscopy Oct 2014 - anastomotic disruption. Covered stent placed across.    Colonoscopy Feb 2014 - 7 mm transverse colon polyp. Diverticulosis. Path-tubular adenoma.        Vitals:    04/30/25 1303   BP: 130/73   Pulse: 89       Physical Exam:  Constitutional:  not in acute distress and well developed  HENT: Head: Normal, normocephalic, atraumatic.  Eyes: conjunctiva clear and sclera nonicteric  GI: soft, non-tender, without masses or organomegaly. Very small reducible ventral hernia.  Skin: normal color  Neurological: alert, oriented x3  Psychiatric: mood is within normal limits. Anxious affect. pt is a good historian; no memory problems were noted.     Laboratory:  Collected on 4/23/25.   Notable for Hgb 12.6 MCV 88  Lipase 27    LFTs wnl on 4/17/25    Serum celiac studies negative last year.    Imaging:  See HPI    Endoscopy:  EGD 3/17/2025-Dr. Sandhu at [facility]: Normal esophagus, stomach, and examined duodenum. A few gastric polyps. Pathology negative for HP. Normal duodenal  mucosa, Chronic inactive gastritis without GIM.     Assessment:  Abiola Borjas is a 64 y.o. female with PMH of who is presenting for complicated diverticulitis s/p sigmoid colectomy in 2014 (c/b anastomotic leak and stricture requiring IR drain and repeat balloon dilations) and robotic lap exploration with low anterior resection in 2015, hypothyroidism, anxiety, prior gallstone pancreatitis s/p cholecystectomy, Meniere's disease who is here for nausea/vomiting, belching, bloating. Has had extensive workup to date including EGD last month which was normal including gastric and duodenal biopsies. She does seem to have some constipation lately which we will start treamtent with daily miralax, but I do not expect this to resolve her issues. Concerned that there is an element of disordered brain-gut interaction, and we started discussion about this and possibly starting a neuromodulator in the future. But with predominant bloating/belching, will obtain SIBO testing before starting a neuromodulator. Could consider trial of low FODMAP diet in the future as well.    Problems:  Nausea  Belching, bloating  Constipation      Plan:  Obtain SIBO hydrogen breath testing with lactulose. Instructions given today.  Can continue once daily PPI if she feels this helps with symptoms. Discussed that EGDs have been negative for GERD thus far and would not pursue pH testing for these issues.  Consider trial of low FODMAP diet if SIBO testing is negative  If FODMAP diet is unsuccessful or not desired, would trial nortriptyline as next step  Follow up in about 2 months (around 6/30/2025).    Discussed with attending Dr. Zhang.    Chuck Flores MD  Gastroenterology Fellow    Orders Placed This Encounter   Procedures    Hydrogen Breath test (HBT) - SIBO (small intestinal bacteria overgrowth) with Lactulose

## 2025-05-02 ENCOUNTER — PATIENT MESSAGE (OUTPATIENT)
Dept: GASTROENTEROLOGY | Facility: CLINIC | Age: 65
End: 2025-05-02
Payer: MEDICARE

## 2025-05-02 RX ORDER — NORTRIPTYLINE HYDROCHLORIDE 10 MG/1
10 CAPSULE ORAL NIGHTLY
Qty: 30 CAPSULE | Refills: 11 | Status: SHIPPED | OUTPATIENT
Start: 2025-05-02 | End: 2026-05-02

## 2025-05-02 NOTE — TELEPHONE ENCOUNTER
Discussed with Ms. Borjas directly. She is very concerned about her ability to adhere to the diet instructions and NPO status needed for the lactulose breath test and is interested in just starting a trial of a neuromodulator. This is very reasonable. Answered her questions and discussed possible adverse affects of nortriptyline including but not limited to drowsiness, constipation, trouble urinating, dry mouth. Discussed that an adequate trial of nortriptyline will take 4-6 weeks before deciding on its efficacy and that symptoms are unlikely to improve in just a few days. Will start 10 nortriptyline 10 mg qhs. Cancel plans for SIBO breath test. Will arrange for clinic follow-up and she will notify me if she has significant adverse effects before then.    Chuck Flores  Gastroenterology and Hepatology Fellow, PGY-VI

## 2025-05-05 ENCOUNTER — TELEPHONE (OUTPATIENT)
Dept: INTERNAL MEDICINE | Facility: CLINIC | Age: 65
End: 2025-05-05
Payer: MEDICARE

## 2025-05-05 ENCOUNTER — PATIENT MESSAGE (OUTPATIENT)
Dept: INTERNAL MEDICINE | Facility: CLINIC | Age: 65
End: 2025-05-05
Payer: MEDICARE

## 2025-05-05 NOTE — TELEPHONE ENCOUNTER
----- Message from Gloria sent at 5/5/2025 11:39 AM CDT -----  Contact: [phone] patient  Caller is requesting an earlier appointment then we can schedule.  Caller is requesting a message be sent to the provider.If this is for urgent care symptoms, did you offer other providers at this location, providers at other locations, or [facility]? (yes, no, n/a):  Nicolas this is for the patients physical, did you offer to schedule next available and put on wait list, or to see NP or PA for their physical?  (yes, no, n/a):  yesWhen is the next available appointment with their provider:  05 07 2025Reason for the appointment:  3 weeks of vomitingPatient preference of timeframe to be scheduled:  Today or tomorrow Would the patient like a call back, or a response through their [facility] portal?:   call Comments:  Patient denied visit today with Ana Juarez

## 2025-05-06 ENCOUNTER — PATIENT MESSAGE (OUTPATIENT)
Dept: INTERNAL MEDICINE | Facility: CLINIC | Age: 65
End: 2025-05-06

## 2025-05-06 ENCOUNTER — LAB VISIT (OUTPATIENT)
Dept: LAB | Facility: HOSPITAL | Age: 65
End: 2025-05-06
Attending: INTERNAL MEDICINE
Payer: MEDICARE

## 2025-05-06 ENCOUNTER — OFFICE VISIT (OUTPATIENT)
Dept: INTERNAL MEDICINE | Facility: CLINIC | Age: 65
End: 2025-05-06
Payer: MEDICARE

## 2025-05-06 VITALS
DIASTOLIC BLOOD PRESSURE: 58 MMHG | SYSTOLIC BLOOD PRESSURE: 110 MMHG | HEART RATE: 94 BPM | WEIGHT: 185.44 LBS | BODY MASS INDEX: 29.8 KG/M2 | OXYGEN SATURATION: 99 % | HEIGHT: 66 IN

## 2025-05-06 DIAGNOSIS — R11.2 NAUSEA AND VOMITING, UNSPECIFIED VOMITING TYPE: ICD-10-CM

## 2025-05-06 DIAGNOSIS — R04.0 EPISTAXIS: ICD-10-CM

## 2025-05-06 DIAGNOSIS — R63.4 WEIGHT LOSS: ICD-10-CM

## 2025-05-06 DIAGNOSIS — E03.9 HYPOTHYROIDISM, UNSPECIFIED TYPE: ICD-10-CM

## 2025-05-06 DIAGNOSIS — R11.2 NAUSEA AND VOMITING, UNSPECIFIED VOMITING TYPE: Primary | ICD-10-CM

## 2025-05-06 DIAGNOSIS — R79.89 ABNORMAL LIVER FUNCTION TESTS: ICD-10-CM

## 2025-05-06 DIAGNOSIS — Z12.73 OVARIAN CANCER SCREENING: Primary | ICD-10-CM

## 2025-05-06 LAB
ABSOLUTE EOSINOPHIL (OHS): 0.05 K/UL
ABSOLUTE MONOCYTE (OHS): 0.41 K/UL (ref 0.3–1)
ABSOLUTE NEUTROPHIL COUNT (OHS): 5.09 K/UL (ref 1.8–7.7)
ALBUMIN SERPL BCP-MCNC: 3.9 G/DL (ref 3.5–5.2)
ALP SERPL-CCNC: 90 UNIT/L (ref 40–150)
ALT SERPL W/O P-5'-P-CCNC: 54 UNIT/L (ref 10–44)
ANION GAP (OHS): 9 MMOL/L (ref 8–16)
AST SERPL-CCNC: 29 UNIT/L (ref 11–45)
BASOPHILS # BLD AUTO: 0.02 K/UL
BASOPHILS NFR BLD AUTO: 0.3 %
BILIRUB SERPL-MCNC: 0.5 MG/DL (ref 0.1–1)
BUN SERPL-MCNC: 11 MG/DL (ref 8–23)
CALCIUM SERPL-MCNC: 9.4 MG/DL (ref 8.7–10.5)
CHLORIDE SERPL-SCNC: 105 MMOL/L (ref 95–110)
CO2 SERPL-SCNC: 27 MMOL/L (ref 23–29)
CREAT SERPL-MCNC: 0.8 MG/DL (ref 0.5–1.4)
CRP SERPL-MCNC: 0.6 MG/L
ERYTHROCYTE [DISTWIDTH] IN BLOOD BY AUTOMATED COUNT: 12.5 % (ref 11.5–14.5)
ERYTHROCYTE [SEDIMENTATION RATE] IN BLOOD BY PHOTOMETRIC METHOD: 9 MM/HR
GFR SERPLBLD CREATININE-BSD FMLA CKD-EPI: >60 ML/MIN/1.73/M2
GLUCOSE SERPL-MCNC: 100 MG/DL (ref 70–110)
HCT VFR BLD AUTO: 44 % (ref 37–48.5)
HGB BLD-MCNC: 13.8 GM/DL (ref 12–16)
IMM GRANULOCYTES # BLD AUTO: 0.02 K/UL (ref 0–0.04)
IMM GRANULOCYTES NFR BLD AUTO: 0.3 % (ref 0–0.5)
LYMPHOCYTES # BLD AUTO: 1.85 K/UL (ref 1–4.8)
MCH RBC QN AUTO: 27.9 PG (ref 27–31)
MCHC RBC AUTO-ENTMCNC: 31.4 G/DL (ref 32–36)
MCV RBC AUTO: 89 FL (ref 82–98)
NUCLEATED RBC (/100WBC) (OHS): 0 /100 WBC
PLATELET # BLD AUTO: 344 K/UL (ref 150–450)
PMV BLD AUTO: 9.9 FL (ref 9.2–12.9)
POTASSIUM SERPL-SCNC: 4.2 MMOL/L (ref 3.5–5.1)
PROT SERPL-MCNC: 7.9 GM/DL (ref 6–8.4)
RBC # BLD AUTO: 4.95 M/UL (ref 4–5.4)
RELATIVE EOSINOPHIL (OHS): 0.7 %
RELATIVE LYMPHOCYTE (OHS): 24.9 % (ref 18–48)
RELATIVE MONOCYTE (OHS): 5.5 % (ref 4–15)
RELATIVE NEUTROPHIL (OHS): 68.3 % (ref 38–73)
SODIUM SERPL-SCNC: 141 MMOL/L (ref 136–145)
T4 FREE SERPL-MCNC: 1.29 NG/DL (ref 0.71–1.51)
TSH SERPL-ACNC: 1.05 UIU/ML (ref 0.4–4)
WBC # BLD AUTO: 7.44 K/UL (ref 3.9–12.7)

## 2025-05-06 PROCEDURE — 36415 COLL VENOUS BLD VENIPUNCTURE: CPT

## 2025-05-06 PROCEDURE — 85025 COMPLETE CBC W/AUTO DIFF WBC: CPT

## 2025-05-06 PROCEDURE — 84481 FREE ASSAY (FT-3): CPT

## 2025-05-06 PROCEDURE — 99215 OFFICE O/P EST HI 40 MIN: CPT | Mod: PBBFAC | Performed by: INTERNAL MEDICINE

## 2025-05-06 PROCEDURE — 99214 OFFICE O/P EST MOD 30 MIN: CPT | Mod: S$PBB,,, | Performed by: INTERNAL MEDICINE

## 2025-05-06 PROCEDURE — 83497 ASSAY OF 5-HIAA: CPT

## 2025-05-06 PROCEDURE — 82040 ASSAY OF SERUM ALBUMIN: CPT

## 2025-05-06 PROCEDURE — 84439 ASSAY OF FREE THYROXINE: CPT

## 2025-05-06 PROCEDURE — 99999 PR PBB SHADOW E&M-EST. PATIENT-LVL V: CPT | Mod: PBBFAC,,, | Performed by: INTERNAL MEDICINE

## 2025-05-06 PROCEDURE — 84443 ASSAY THYROID STIM HORMONE: CPT

## 2025-05-06 PROCEDURE — 86140 C-REACTIVE PROTEIN: CPT

## 2025-05-06 PROCEDURE — 85652 RBC SED RATE AUTOMATED: CPT

## 2025-05-06 PROCEDURE — 86304 IMMUNOASSAY TUMOR CA 125: CPT

## 2025-05-06 PROCEDURE — 83519 RIA NONANTIBODY: CPT

## 2025-05-06 NOTE — PROGRESS NOTES
EDICAL HISTORY:  Hypothyroid disease.  Pre diabetes  Fatty liver with focal nodular hyperplasia.  Anxiety disorder.  Cholecystectomy with pancreatitis.   x3.  Abdominal hernia repair.  Diverticula disease of the colon, for which she had in , a sigmoid colectomy due to diverticulitis, then a stent put in a month later because of microperforation. In 2015, she underwent a balloon dilatation due to stenosis of the colonic anastomosis        Oncology History Overview Note   Diverticulitis   1. 2014: Surgery - Lap segmental resection of sigmoid cancer  2. Complicated by anastomotic leak and subsequent stricture managed conservatively with IR drain and repeat balloon dilations  3. 12/7/15: Robo lap exploration, laparoscopic hand-assisted redo low anterior resection, hand assisted omental advancement flap, cysto and stents     SOCIAL HISTORY:  Tobacco use and alcohol use -- none.       Medications  Xanax 0.5 mg 3 times daily   cytomel 25 mcg half a tablet daily od  Levothyroxine 0.088 mg daily   Flonase 2 puffs daily   Claritin once a day  Prilosec 20 mg     Methocarbamol 500 mg q.h.s.   Gabapentin 100 mg ks      64-year-old female     What prompted the appointment today as a yesterday she felt bad had an episode of nausea and vomiting.  She also had an episode of vomiting about 10 days prior on .  She has been having this off and on for awhile.  Just feeling bad in general.  The other day she had a nosebleed from her left nostril.    Last week she was seen by Gastroenterology and recommended a do hydrogen breath test to evaluate for SIBO.  In addition the medicine nortriptyline was prescribed    However she also spoke to [facility]enterologist at [facility] and cm irritation at doing a gastric emptying study before proceeding with the recommendations outlined above    It is noted that she has lost about 20 lb within the past 6 months.    Occasionally she will feel uncomfortable up abdomen.  There  was 1 day she did not take omeprazole and felt bad.    Since the last visit she was recommend take MiraLax x-ray revealed stool.  This on days she is a lot of bowel function another day she did not.    Examination   Weight 185   BMI 29.93   Pulse 88   Blood pressure 110/58   Chest clear breath sounds   Heart regular rate and rhythm abdominal exam soft, nontender no hepatosplenomegaly abdominal masses.    Nasal mucosa clear congestion    Impression   Recurrent nausea, vomiting  Weight loss  episatxis

## 2025-05-07 ENCOUNTER — OFFICE VISIT (OUTPATIENT)
Dept: OBSTETRICS AND GYNECOLOGY | Facility: CLINIC | Age: 65
End: 2025-05-07
Attending: OBSTETRICS & GYNECOLOGY
Payer: MEDICARE

## 2025-05-07 ENCOUNTER — TELEPHONE (OUTPATIENT)
Dept: RADIOLOGY | Facility: HOSPITAL | Age: 65
End: 2025-05-07
Payer: MEDICARE

## 2025-05-07 VITALS
BODY MASS INDEX: 29.48 KG/M2 | WEIGHT: 183.44 LBS | HEIGHT: 66 IN | DIASTOLIC BLOOD PRESSURE: 60 MMHG | SYSTOLIC BLOOD PRESSURE: 100 MMHG

## 2025-05-07 DIAGNOSIS — R32 URINARY INCONTINENCE, UNSPECIFIED TYPE: ICD-10-CM

## 2025-05-07 DIAGNOSIS — Z01.419 WELL FEMALE EXAM WITH ROUTINE GYNECOLOGICAL EXAM: Primary | ICD-10-CM

## 2025-05-07 DIAGNOSIS — N64.4 BREAST PAIN, RIGHT: ICD-10-CM

## 2025-05-07 PROBLEM — R93.89 THICKENED ENDOMETRIUM: Status: RESOLVED | Noted: 2021-07-15 | Resolved: 2025-05-07

## 2025-05-07 PROBLEM — N84.0 ENDOMETRIAL POLYP: Status: RESOLVED | Noted: 2021-07-15 | Resolved: 2025-05-07

## 2025-05-07 PROBLEM — N95.0 POSTMENOPAUSAL BLEEDING: Status: RESOLVED | Noted: 2018-03-08 | Resolved: 2025-05-07

## 2025-05-07 LAB
CANCER AG125 SERPL-ACNC: 8 UNIT/ML
T3FREE SERPL-MCNC: 4.3 PG/ML (ref 2.3–4.2)

## 2025-05-07 PROCEDURE — 99215 OFFICE O/P EST HI 40 MIN: CPT | Mod: PBBFAC | Performed by: OBSTETRICS & GYNECOLOGY

## 2025-05-07 PROCEDURE — G0101 CA SCREEN;PELVIC/BREAST EXAM: HCPCS | Mod: S$PBB,,, | Performed by: OBSTETRICS & GYNECOLOGY

## 2025-05-07 PROCEDURE — 87624 HPV HI-RISK TYP POOLED RSLT: CPT | Performed by: OBSTETRICS & GYNECOLOGY

## 2025-05-07 PROCEDURE — 99999 PR PBB SHADOW E&M-EST. PATIENT-LVL V: CPT | Mod: PBBFAC,,, | Performed by: OBSTETRICS & GYNECOLOGY

## 2025-05-07 NOTE — PROGRESS NOTES
SUBJECTIVE:   64 y.o. female   for routine gyn exam. No LMP recorded (lmp unknown). Patient is postmenopausal..  She has right breast pain for about 1 month. Denies trauma. No skin changes or nipple d/c. Normal MMG -.  No PMB. No HRT. Had pelvic u/s and Embx at [facility] and was told it was OK.  She has nausea and weight loss and is concerned about ovarian CA.  Also has urinary incontinence  Present with daughter.         Past Medical History:   Diagnosis Date    Allergy     Anxiety     Colon polyps     Deviated septum     Diverticulosis of sigmoid colon     Focal nodular hyperplasia of liver     Hypothyroidism     PONV (postoperative nausea and vomiting)     Snores     Thyroid disease      Past Surgical History:   Procedure Laterality Date    ABDOMINAL HERNIA REPAIR       SECTION      CHOLECYSTECTOMY      COLECTOMY  2015    [facility] for stricture    COLON SURGERY  2015    [facility] for Stricture     COLONOSCOPY  2015    Stricture     ENDOMETRIAL ABLATION      HERNIA REPAIR       Social History[1]  Family History   Problem Relation Name Age of Onset    Diabetes Mother      Hypertension Mother      Kidney cancer Father      Heart disease Father      Depression Sister      Breast cancer Paternal Aunt  70    Heart disease Paternal Grandfather      Prostate cancer Other      Leukemia Other          Medicine induced from chemo    Colon cancer Neg Hx      Miscarriages / Stillbirths Neg Hx       OB History    Para Term  AB Living   3 3 3   3   SAB IAB Ectopic Multiple Live Births       3      # Outcome Date GA Lbr Mark/2nd Weight Sex Type Anes PTL Lv   3 Term  39w0d   F CS-LTranv   PAMELA      Complications: Fetal Intolerance, Previous  section   2 Term  39w0d   F CS-LTranv   PAMELA      Complications: Breech presentation   1 Term  42w0d   F CS-LTranv   PAMELA      Complications: Failure to Progress in Second Stage, Decreased heart rate     "  Obstetric Comments   Menarche ~ 11         Current Medications[2]  Allergies: Patient has no known allergies.     ROS:  Constitutional: no weight loss, weight gain, fever, fatigue  Eyes:  No vision changes, glasses/contacts  ENT/Mouth: No ulcers, sinus problems, ears ringing, headache  Cardiovascular: No inability to lie flat, chest pain, exercise intolerance, swelling, heart palpitations  Respiratory: No wheezing, coughing blood, shortness of breath, or cough  Gastrointestinal: No diarrhea, bloody stool, nausea/vomiting, constipation, gas, hemorrhoids  Genitourinary: No blood in urine, painful urination, urgency of urination, frequency of urination, incomplete emptying, +incontinence, abnormal bleeding, painful periods, heavy periods, vaginal discharge, vaginal odor, painful intercourse, sexual problems, bleeding after intercourse.  Musculoskeletal: No muscle weakness  Skin/Breast: + painful breasts, no nipple discharge, masses, rash, ulcers  Neurological: No passing out, seizures, numbness, headache  Endocrine: No diabetes, hypothyroid, hyperthyroid, hot flashes, hair loss, abnormal hair growth, acne  Psychiatric: No depression, crying  Hematologic: No bruises, bleeding, swollen lymph nodes, anemia.      OBJECTIVE:   The patient appears well, alert, oriented x 3, in no distress.  /60 (BP Location: Right arm, Patient Position: Sitting)   Ht 5' 6" (1.676 m)   Wt 83.2 kg (183 lb 6.8 oz)   LMP  (LMP Unknown) Comment:   2006  BMI 29.61 kg/m²   NECK: no thyromegaly, trachea midline  SKIN: no acne, striae, hirsutism  CHEST: CTAB  CV: RRR  BREAST EXAM: breasts appear normal, no suspicious masses, no skin or nipple changes or axillary nodes  ABDOMEN: no hernias, masses, or hepatosplenomegaly  GENITALIA: normal external genitalia, no erythema, no discharge  URETHRA: normal urethra, normal urethral meatus  VAGINA: Normal  CERVIX: no lesions or cervical motion tenderness  UTERUS: normal size, contour, position, " consistency, mobility, non-tender  ADNEXA: no mass, fullness, tenderness      ASSESSMENT:   1. Well female exam with routine gynecological exam  Liquid-Based Pap Smear, Screening      2. Breast pain, right  US Breast Right Limited    Mammo Digital Diagnostic Right with Jamaal (XPD)      3. Urinary incontinence, unspecified type  Ambulatory referral/consult to Urogynecology    Ambulatory Referral/Consult to Physical Therapy          PLAN:   Orders Placed This Encounter    US Breast Right Limited    Mammo Digital Diagnostic Right with Jamaal (XPD)    Ambulatory referral/consult to Urogynecology    Ambulatory Referral/Consult to Physical Therapy    Liquid-Based Pap Smear, Screening     Discussed right breast pain and imagine.  Discussed healthy lifestyle including regular exercise, healthy eating, etc.  Return to clinic in 1 year         [1]   Social History  Socioeconomic History    Marital status:     Number of children: 3   Tobacco Use    Smoking status: Never     Passive exposure: Never    Smokeless tobacco: Never   Substance and Sexual Activity    Alcohol use: Not Currently     Alcohol/week: 7.0 standard drinks of alcohol     Types: 7 Glasses of wine per week     Comment: social    Drug use: No    Sexual activity: Not Currently     Partners: Male     Birth control/protection: Post-menopausal, None, See Surgical Hx     Comment: Endometrial Ablation     Social Drivers of Health      [facility]    Housing Stability   [2]   Current Outpatient Medications   Medication Sig Dispense Refill    acetaminophen (TYLENOL) 500 MG tablet Take 1,000 mg by mouth.      ALPRAZolam (XANAX) 0.5 MG tablet Take 0.5 mg by mouth 3 (three) times daily as needed.       cyanocobalamin (VITAMIN B-12) 100 MCG tablet Take 100 mcg by mouth once daily.      CYTOMEL 25 mcg Tab TAKE 1/2 TABLET BY MOUTH DAILY 45 tablet 3    fluticasone propionate (FLONASE) 50 mcg/actuation nasal spray 1 spray (50 mcg total) by Each  Nostril route once daily. 16 g 2    gabapentin (NEURONTIN) 100 MG capsule Take 1 capsule (100 mg total) by mouth 2 (two) times daily. 60 capsule 1    ibuprofen (ADVIL,MOTRIN) 200 MG tablet Take 200 mg by mouth every 8 (eight) hours as needed.      Lactobacillus rhamnosus GG (CULTURELLE) 10 billion cell capsule Take 1 capsule by mouth once daily.      lancets Misc 1 Device by Misc.(Non-Drug; Combo Route) route 2 (two) times daily. 100 each 11    loratadine (CLARITIN) 10 mg tablet Take 10 mg by mouth once daily.      magnesium 30 mg Tab Take by mouth once. 250 mg twice a day      melatonin 3 mg Tab Take 5 mg by mouth.      methocarbamoL (ROBAXIN) 500 MG Tab Take 1 tablet (500 mg total) by mouth every evening. 30 tablet 5    multivitamin capsule Take 1 capsule by mouth once daily.      nortriptyline (PAMELOR) 10 MG capsule Take 1 capsule (10 mg total) by mouth every evening. 30 capsule 11    omega-3 fatty acids/fish oil (FISH OIL-OMEGA-3 FATTY ACIDS) 300-1,000 mg capsule Take by mouth once daily.      ondansetron (ZOFRAN) 4 MG tablet Take 1 tablet (4 mg total) by mouth every 8 (eight) hours as needed for Nausea. 30 tablet 0    SYNTHROID 88 mcg tablet TAKE 1 TABLET(88 MCG) BY MOUTH BEFORE BREAKFAST 90 tablet 3    TRUE METRIX GLUCOSE TEST STRIP Strp 1 STRIP BY [facility].(NON-DRUG COMBO ROUTE) ROUTE 2 (TWO) TIMES DAILY. 100 strip 10    turmeric 400 mg Cap Take 1 tablet by mouth every 12 (twelve) hours.      vitamin C-multivitamin-mineral 500 mg Chew Take by mouth.      vitamin D (VITAMIN D3) 1000 units Tab Take 1,000 Units by mouth once daily.      zinc gluconate 50 mg tablet Take 50 mg by mouth once daily.       No current facility-administered medications for this visit.

## 2025-05-07 NOTE — TELEPHONE ENCOUNTER
----- Message from Med Assistant Reynaga sent at 5/7/2025 10:16 AM CDT -----  Patient needs Dx Mammogram appointment for Right Breast Pain.Juhi

## 2025-05-08 ENCOUNTER — PATIENT MESSAGE (OUTPATIENT)
Dept: GASTROENTEROLOGY | Facility: CLINIC | Age: 65
End: 2025-05-08
Payer: MEDICARE

## 2025-05-08 ENCOUNTER — RESULTS FOLLOW-UP (OUTPATIENT)
Dept: INTERNAL MEDICINE | Facility: CLINIC | Age: 65
End: 2025-05-08

## 2025-05-08 ENCOUNTER — TELEPHONE (OUTPATIENT)
Dept: INTERNAL MEDICINE | Facility: CLINIC | Age: 65
End: 2025-05-08
Payer: MEDICARE

## 2025-05-08 NOTE — TELEPHONE ENCOUNTER
I called and spoke with pt, and she would like to know, can you put in the order for the test that did not go through for her to go get them done.

## 2025-05-08 NOTE — TELEPHONE ENCOUNTER
----- Message from Abby sent at 5/8/2025 12:45 PM CDT -----  Contact: [phone]  [email or web address] Patient is calling for Medical Advice regarding: Patient need a call back to verify what's orders are pending. Patient would like to come tomorrow anytime. :Patient wants a call back or thru [facility]: call back Comments: Thank you Please advise patient replies from provider may take up to 48 hours.

## 2025-05-09 ENCOUNTER — TELEPHONE (OUTPATIENT)
Dept: RADIOLOGY | Facility: HOSPITAL | Age: 65
End: 2025-05-09
Payer: MEDICARE

## 2025-05-09 LAB — 5OH-INDOLEACETATE SERPL-MCNC: 24 NG/ML

## 2025-05-12 RX ORDER — GABAPENTIN 100 MG/1
100 CAPSULE ORAL 2 TIMES DAILY
Qty: 60 CAPSULE | Refills: 1 | Status: SHIPPED | OUTPATIENT
Start: 2025-05-12

## 2025-05-12 NOTE — TELEPHONE ENCOUNTER
No care due was identified.  [facility] Embedded Care Due Messages. Reference number: [id number].   5/12/2025 9:41:39 AM CDT

## 2025-05-12 NOTE — TELEPHONE ENCOUNTER
Refill Routing Note   Medication(s) are not appropriate for processing by [facility] for the following reason(s):        Outside of protocol    [facility] action(s):  Route               Appointments  past 12m or future 3m with PCP    Date Provider   Last Visit   5/6/2025 César Contreras MD   Next Visit   Visit date not found César Contreras MD   ED visits in past 90 days: 0        Note composed:10:23 AM 05/12/2025

## 2025-05-13 ENCOUNTER — PATIENT MESSAGE (OUTPATIENT)
Dept: INTERNAL MEDICINE | Facility: CLINIC | Age: 65
End: 2025-05-13
Payer: MEDICARE

## 2025-05-13 NOTE — TELEPHONE ENCOUNTER
LOV with César Contreras MD , 5/6/2025  Pt requesting feedback on lab results. (2nd request)  Labs drawn 5/6/25.    5/8/25 telephone encounter - pt requested feedback on lab results. Routed to PCP.   TEAMS message sent for assistance with scheduling gastric emptying study.

## 2025-05-14 ENCOUNTER — RESULTS FOLLOW-UP (OUTPATIENT)
Dept: OBSTETRICS AND GYNECOLOGY | Facility: CLINIC | Age: 65
End: 2025-05-14

## 2025-05-14 LAB — PANCREASTATIN SERPL-MCNC: NORMAL PG/ML

## 2025-05-21 ENCOUNTER — HOSPITAL ENCOUNTER (OUTPATIENT)
Dept: RADIOLOGY | Facility: HOSPITAL | Age: 65
Discharge: HOME | End: 2025-05-21
Attending: OBSTETRICS & GYNECOLOGY
Payer: MEDICARE

## 2025-05-21 DIAGNOSIS — N64.4 BREAST PAIN, RIGHT: ICD-10-CM

## 2025-05-21 PROCEDURE — 76641 ULTRASOUND BREAST COMPLETE: CPT | Mod: TC,RT

## 2025-05-29 ENCOUNTER — PATIENT MESSAGE (OUTPATIENT)
Dept: REHABILITATION | Facility: HOSPITAL | Age: 65
End: 2025-05-29
Payer: MEDICARE

## 2025-06-09 ENCOUNTER — PATIENT MESSAGE (OUTPATIENT)
Dept: RESEARCH | Facility: HOSPITAL | Age: 65
End: 2025-06-09
Payer: MEDICARE

## 2025-06-13 ENCOUNTER — TELEPHONE (OUTPATIENT)
Dept: RESEARCH | Facility: HOSPITAL | Age: 65
End: 2025-06-13
Payer: MEDICARE

## 2025-07-07 RX ORDER — GABAPENTIN 100 MG/1
100 CAPSULE ORAL 2 TIMES DAILY
Qty: 60 CAPSULE | Refills: 1 | Status: SHIPPED | OUTPATIENT
Start: 2025-07-07

## 2025-07-07 NOTE — TELEPHONE ENCOUNTER
No care due was identified.  [facility] Embedded Care Due Messages. Reference number: [id number].   7/07/2025 12:31:43 PM CDT

## 2025-07-23 ENCOUNTER — OFFICE VISIT (OUTPATIENT)
Dept: INTERNAL MEDICINE | Facility: CLINIC | Age: 65
End: 2025-07-23
Payer: MEDICARE

## 2025-07-23 ENCOUNTER — LAB VISIT (OUTPATIENT)
Dept: LAB | Facility: HOSPITAL | Age: 65
End: 2025-07-23
Attending: INTERNAL MEDICINE
Payer: MEDICARE

## 2025-07-23 VITALS
OXYGEN SATURATION: 100 % | WEIGHT: 190.06 LBS | HEART RATE: 91 BPM | DIASTOLIC BLOOD PRESSURE: 70 MMHG | HEIGHT: 66 IN | BODY MASS INDEX: 30.54 KG/M2 | SYSTOLIC BLOOD PRESSURE: 126 MMHG

## 2025-07-23 DIAGNOSIS — Z13.6 ENCOUNTER FOR LIPID SCREENING FOR CARDIOVASCULAR DISEASE: ICD-10-CM

## 2025-07-23 DIAGNOSIS — I73.9 PERIPHERAL VASOCONSTRICTION: Primary | ICD-10-CM

## 2025-07-23 DIAGNOSIS — E03.9 HYPOTHYROIDISM, UNSPECIFIED TYPE: ICD-10-CM

## 2025-07-23 DIAGNOSIS — R82.90 MALODOROUS URINE: ICD-10-CM

## 2025-07-23 DIAGNOSIS — I73.9 PERIPHERAL VASOCONSTRICTION: ICD-10-CM

## 2025-07-23 DIAGNOSIS — Z13.1 DIABETES MELLITUS SCREENING: ICD-10-CM

## 2025-07-23 DIAGNOSIS — Z13.220 ENCOUNTER FOR LIPID SCREENING FOR CARDIOVASCULAR DISEASE: ICD-10-CM

## 2025-07-23 PROCEDURE — 86235 NUCLEAR ANTIGEN ANTIBODY: CPT

## 2025-07-23 PROCEDURE — 36415 COLL VENOUS BLD VENIPUNCTURE: CPT

## 2025-07-23 PROCEDURE — 99215 OFFICE O/P EST HI 40 MIN: CPT | Mod: PBBFAC | Performed by: INTERNAL MEDICINE

## 2025-07-23 PROCEDURE — 86225 DNA ANTIBODY NATIVE: CPT

## 2025-07-23 PROCEDURE — 86235 NUCLEAR ANTIGEN ANTIBODY: CPT | Mod: 59

## 2025-07-23 PROCEDURE — 86038 ANTINUCLEAR ANTIBODIES: CPT

## 2025-07-23 PROCEDURE — 99214 OFFICE O/P EST MOD 30 MIN: CPT | Mod: S$PBB,,, | Performed by: INTERNAL MEDICINE

## 2025-07-23 PROCEDURE — 99999 PR PBB SHADOW E&M-EST. PATIENT-LVL V: CPT | Mod: PBBFAC,,, | Performed by: INTERNAL MEDICINE

## 2025-07-23 NOTE — PROGRESS NOTES
EDICAL HISTORY:  Hypothyroid disease.  Pre diabetes  Fatty liver with focal nodular hyperplasia.  Anxiety disorder.  Cholecystectomy with pancreatitis.   x3.  Abdominal hernia repair.  Diverticula disease of the colon, for which she had in , a sigmoid colectomy due to diverticulitis, then a stent put in a month later because of microperforation. In 2015, she underwent a balloon dilatation due to stenosis of the colonic anastomosis        Oncology History Overview Note   Diverticulitis   1. 2014: Surgery - Lap segmental resection of sigmoid cancer  2. Complicated by anastomotic leak and subsequent stricture managed conservatively with IR drain and repeat balloon dilations  3. 12/7/15: Robo lap exploration, laparoscopic hand-assisted redo low anterior resection, hand assisted omental advancement flap, cysto and stents     SOCIAL HISTORY:  Tobacco use and alcohol use -- none.       Medications  Xanax 0.5 mg 3 times daily   cytomel 25 mcg half a tablet daily od  Levothyroxine 0.088 mg daily   Flonase 2 puffs daily   Claritin once a day  Prilosec 20 mg   Methocarbamol 500 mg q.h.s.   Gabapentin 100 mg ks      64-year-old female Reason for visit is a vascular issue regarding her right hand particularly a right thumb    [place] she started noticing the itching sensation involving the tip of the right thumb and then it was a definitive purplish discoloration.  She showed me a picture this lasted for about 2 days.  She went to emergency room.  Chemistry CBC lab was normal CT of the hand reveal no abnormal findings.  ER doctor thought the hands in general relatively cold.  But she did not feel that way.  Afterwards to occasion she had a tingling sensation involving the thumb and wished it look white.  She is not aware of any triggers.  She does remember put in a hand on any cold environment    This morning she had a similar situation  That has been no other  symptoms with this    Exam   Vital signs per epic  Strong 2+ brachioradial pulses  Normal capillary refill without digits.  The right thumb is not edematous is swollen no pain.  This is a little bit a strong reddish coloration compared to the left.  There might be 1 area of coldness    Impression   Possible Raynaud's phenomenon with vasoconstriction  Hypothyroid      Plan   Labs of VERNA, T3-T4 TSH vascular digital study

## 2025-07-25 ENCOUNTER — PATIENT MESSAGE (OUTPATIENT)
Dept: INTERNAL MEDICINE | Facility: CLINIC | Age: 65
End: 2025-07-25
Payer: MEDICARE

## 2025-07-25 DIAGNOSIS — R82.90 MALODOROUS URINE: Primary | ICD-10-CM

## 2025-07-25 LAB
ANA (OHS): POSITIVE
ANA PATTERN 1 (OHS): ABNORMAL
ANA PATTERN 2 (OHS): ABNORMAL
ANA TITER 1 (OHS): ABNORMAL
ANA TITER 2 (OHS): ABNORMAL
DSDNA ANTIBODY (OHS): NORMAL
DSDNA ANTIBODY TITER (OHS): NORMAL

## 2025-07-26 LAB
SM  ANTIBODY (OHS): 0.1 RATIO
SM INTERPRETATION (OHS): NEGATIVE
SM/RNP ANTIBODY (OHS): 0.09 RATIO
SM/RNP INTERPRETATION (OHS): NEGATIVE
SSA  ANTIBODY (OHS): 0.1 RATIO (ref 0–0.99)
SSA INTERPRETATION (OHS): NEGATIVE
SSB  ANTIBODY (OHS): 0.07 RATIO
SSB INTERPRETATION (OHS): NEGATIVE

## 2025-07-30 ENCOUNTER — APPOINTMENT (OUTPATIENT)
Dept: LAB | Facility: HOSPITAL | Age: 65
End: 2025-07-30
Attending: INTERNAL MEDICINE
Payer: MEDICARE

## 2025-07-31 ENCOUNTER — PATIENT MESSAGE (OUTPATIENT)
Dept: VASCULAR SURGERY | Facility: CLINIC | Age: 65
End: 2025-07-31
Payer: MEDICARE

## 2025-07-31 ENCOUNTER — PATIENT MESSAGE (OUTPATIENT)
Dept: INTERNAL MEDICINE | Facility: CLINIC | Age: 65
End: 2025-07-31
Payer: MEDICARE

## 2025-07-31 DIAGNOSIS — E03.9 HYPOTHYROIDISM, UNSPECIFIED TYPE: Primary | ICD-10-CM

## 2025-08-01 ENCOUNTER — HOSPITAL ENCOUNTER (OUTPATIENT)
Dept: VASCULAR SURGERY | Facility: CLINIC | Age: 65
Discharge: HOME | End: 2025-08-01
Attending: INTERNAL MEDICINE
Payer: MEDICARE

## 2025-08-01 DIAGNOSIS — I73.9 PERIPHERAL VASOCONSTRICTION: ICD-10-CM

## 2025-08-01 PROCEDURE — 93923 UPR/LXTR ART STDY 3+ LVLS: CPT | Mod: PBBFAC | Performed by: STUDENT IN AN ORGANIZED HEALTH CARE EDUCATION/TRAINING PROGRAM

## 2025-08-01 PROCEDURE — 93923 UPR/LXTR ART STDY 3+ LVLS: CPT | Mod: 26,S$PBB,, | Performed by: STUDENT IN AN ORGANIZED HEALTH CARE EDUCATION/TRAINING PROGRAM
